# Patient Record
Sex: FEMALE | Race: WHITE | NOT HISPANIC OR LATINO | Employment: FULL TIME | URBAN - METROPOLITAN AREA
[De-identification: names, ages, dates, MRNs, and addresses within clinical notes are randomized per-mention and may not be internally consistent; named-entity substitution may affect disease eponyms.]

---

## 2017-02-16 ENCOUNTER — HOSPITAL ENCOUNTER (OUTPATIENT)
Dept: GENERAL RADIOLOGY | Facility: HOSPITAL | Age: 52
Discharge: HOME | End: 2017-02-16
Admitting: INTERNAL MEDICINE

## 2017-02-16 ENCOUNTER — TRANSCRIBE ORDERS (OUTPATIENT)
Dept: ADMINISTRATIVE | Facility: HOSPITAL | Age: 52
End: 2017-02-16

## 2017-02-16 ENCOUNTER — OFFICE VISIT (OUTPATIENT)
Dept: INTERNAL MEDICINE | Facility: CLINIC | Age: 52
End: 2017-02-16

## 2017-02-16 ENCOUNTER — HOSPITAL ENCOUNTER (OUTPATIENT)
Dept: GENERAL RADIOLOGY | Facility: HOSPITAL | Age: 52
Discharge: HOME | End: 2017-02-16

## 2017-02-16 VITALS
BODY MASS INDEX: 33.93 KG/M2 | OXYGEN SATURATION: 96 % | HEART RATE: 84 BPM | HEIGHT: 70 IN | SYSTOLIC BLOOD PRESSURE: 110 MMHG | DIASTOLIC BLOOD PRESSURE: 68 MMHG | WEIGHT: 237 LBS

## 2017-02-16 DIAGNOSIS — Z23 NEED FOR INFLUENZA VACCINATION: ICD-10-CM

## 2017-02-16 DIAGNOSIS — K63.5 BENIGN COLON POLYP: Chronic | ICD-10-CM

## 2017-02-16 DIAGNOSIS — G89.29 CHRONIC PAIN OF LEFT KNEE: ICD-10-CM

## 2017-02-16 DIAGNOSIS — G89.29 CHRONIC KNEE PAIN, UNSPECIFIED LATERALITY: ICD-10-CM

## 2017-02-16 DIAGNOSIS — Z23 NEED FOR TDAP VACCINATION: ICD-10-CM

## 2017-02-16 DIAGNOSIS — M25.569 CHRONIC KNEE PAIN, UNSPECIFIED LATERALITY: Primary | ICD-10-CM

## 2017-02-16 DIAGNOSIS — R63.5 ABNORMAL WEIGHT GAIN: Chronic | ICD-10-CM

## 2017-02-16 DIAGNOSIS — M25.562 CHRONIC PAIN OF LEFT KNEE: ICD-10-CM

## 2017-02-16 DIAGNOSIS — G89.29 CHRONIC KNEE PAIN, UNSPECIFIED LATERALITY: Primary | ICD-10-CM

## 2017-02-16 DIAGNOSIS — M25.569 CHRONIC KNEE PAIN, UNSPECIFIED LATERALITY: ICD-10-CM

## 2017-02-16 DIAGNOSIS — E66.9 NON MORBID OBESITY, UNSPECIFIED OBESITY TYPE: Chronic | ICD-10-CM

## 2017-02-16 DIAGNOSIS — J01.91 ACUTE RECURRENT SINUSITIS, UNSPECIFIED LOCATION: Primary | ICD-10-CM

## 2017-02-16 DIAGNOSIS — Z92.29 HISTORY OF TETANUS, DIPHTHERIA, AND ACELLULAR PERTUSSIS BOOSTER VACCINATION (TDAP): ICD-10-CM

## 2017-02-16 PROCEDURE — 73560 X-RAY EXAM OF KNEE 1 OR 2: CPT

## 2017-02-16 PROCEDURE — 90472 IMMUNIZATION ADMIN EACH ADD: CPT | Performed by: INTERNAL MEDICINE

## 2017-02-16 PROCEDURE — 90715 TDAP VACCINE 7 YRS/> IM: CPT | Performed by: INTERNAL MEDICINE

## 2017-02-16 PROCEDURE — 90656 IIV3 VACC NO PRSV 0.5 ML IM: CPT | Performed by: INTERNAL MEDICINE

## 2017-02-16 PROCEDURE — 90471 IMMUNIZATION ADMIN: CPT | Performed by: INTERNAL MEDICINE

## 2017-02-16 PROCEDURE — 73590 X-RAY EXAM OF LOWER LEG: CPT

## 2017-02-16 PROCEDURE — 99214 OFFICE O/P EST MOD 30 MIN: CPT | Performed by: INTERNAL MEDICINE

## 2017-02-16 RX ORDER — LEVOFLOXACIN 750 MG/1
TABLET ORAL
Qty: 10 TABLET | Refills: 0 | Status: SHIPPED | OUTPATIENT
Start: 2017-02-16 | End: 2017-03-14

## 2017-02-16 RX ORDER — PHENTERMINE HYDROCHLORIDE 37.5 MG/1
CAPSULE ORAL
Qty: 30 CAPSULE | Refills: 1 | Status: SHIPPED | OUTPATIENT
Start: 2017-02-16 | End: 2017-03-14 | Stop reason: SDUPTHER

## 2017-02-16 RX ORDER — TOPIRAMATE 50 MG/1
TABLET, FILM COATED ORAL
Qty: 30 TABLET | Refills: 3 | Status: SHIPPED | OUTPATIENT
Start: 2017-02-16 | End: 2017-05-01

## 2017-02-16 NOTE — PROGRESS NOTES
02/16/2017    Patient Information  Rosemarie CHINCHILLA [address] [address]      1965  [phone]    Chief Complaint:     Complaining of sinus infection and nose irritation.  Complaining of left knee pain.  Complaining of weight gain.  Follow-up recent colonoscopy and history of colon polyps.    History of Present Illness:    Patient with a history of hyperlipidemia, multiple colon polyps, diverticulosis of colon, allergic rhinitis/environmental allergies, cervical disc disease.  She presents today with concerns regarding several issues as will be described below.  Her past medical history reviewed and updated where necessary including her health maintenance parameters.  This reveals multiple deficiencies including the fact that she needs a Pap smear and several immunizations including influenza and TDaP.  It was also recommended the patient received a pneumococcal vaccination but I think she is a little too young for that at age 51.  Patient had a second colonoscopy towards the end of last year and it revealed multiple colon polyps.    The history regarding multiple colon polyps is as follows:    11/22/2016--colonoscopy revealed a polyp at the ileocecal valve and sigmoid colon.  There were 4 polyps at the rectosigmoid colon.  The single diverticulum in the sigmoid colon was not commented upon.  Pathology returned a sessile serrated adenoma at the ileocecal valve and the sigmoid colon.  The rectosigmoid polyp was hyperplastic.    06/21/2016--colonoscopy revealed a greater than 50 mm polyp at the ileocecal valve.  Pathology returned hyperplastic polyp.  A multilobulated 5 mm polyp was found in the transverse colon.  Pathology returned tubular adenoma with low grade dysplasia.  3 multilobulated polyps were found in the sigmoid colon.  Pathology revealed fragments of hyperplastic polyps.   2 multilobulated polyps were found in the rectosigmoid colon and pathology returned tubular adenoma with low-grade dysplasia.  A single small mouth diverticula and was found in the sigmoid.  Nonbleeding internal hemorrhoids.  General surgery recommended repeat colonoscopy in 3 months.    The history regarding sinusitis is as follows:    02/16/2017--patient again presents with a two-week history of left sided nasal congestion associated with discomfort in the nasal mucosa on that side of the nose, sinus pressure particularly in the morning, yellow/green posterior nasal drainage without fever or chills.  Examination reveals boggy nasal mucosa.  I do not see any intranasal lesions.  No definite tenderness to percussion over the sinuses.  I will treat patient for sinusitis and if she has another recurrence then we will refer her to ENT for possible endoscopic evaluation.  Once again, I do not appreciate any polyps.  Patient understands that my examination equipment and is somewhat limited.  We will treat her aggressively with the following: Levaquin 750 mg by mouth daily ×10 days.  She needs to apply bacitracin to her nares as per previous.  Stahist AD one by mouth every 6 hours as needed for congestion, not greater than 3 in 24 hours.    11/02/2016--patient presents with a two-week history of left sided nasal congestion associated with irritated and watery left eye.  She has left sided yellow purulent discharge.  No significant sinus pain but there is left-sided congestion and discomfort in the nasal passage.  Occasional sneezing.  No fever, chills, or other systemic signs or symptoms.  Patient has environmental allergies and I do think that's playing a role.  Examination reveals boggy and erythematous left sided nasal mucosa but no definite tenderness to percussion over the sinuses.  Her left eye looks a little red and there seems to be a little more puffiness of her left lower eyelid.  Plan is to treat for suspected  left maxillary sinusitis but I do think she needs aggressive treatment for environmental allergies as well.  Augmentin 875 mg twice a day ×12 days.  Prednisone 50 mg by mouth daily ×5 days, taper and discontinue.  Apply bacitracin ointment to left nasal mucosa several times daily.      The history regarding left knee pain is as follows:    02/16/2017--patient presents with a two-month history of left knee pain that is located anteriorly and medially.  It is situated below the anserine bursal area and tends to radiate up towards the patella medially.  Certain movements seem to aggravate it.  No known trauma.  Examination reveals tenderness over the tibia medially and below the anserine bursal area.  No gross deformity.  Ligaments seem stable and range of motion is good.  Pain is reproduced with pressure applied perpendicular to the knee joint and directed medially.  The tenderness over the tibial area makes me question whether or not there could be a stress fracture.  X-ray of the left knee ordered.  Depending upon the results, she may need orthopedic referral to Dr. Mo.    The history regarding abnormal weight gain/non-morbid obesity is as follows:    02/16/2017--patient presents with a several year history of weight gain despite multiple attempts at various diet and exercise regimens.  She has failed low carbohydrate diet.  He has not been to [employer] which in my opinion is a farce.  She is asking about Saxenda which I think could be very helpful but I explained to her that it's extremely expensive and her insurance is likely not pay for it.  Another option that is far cheaper would be phentermine and topiramate.  Start phentermine one by mouth daily along with topiramate 50 mg per day.  Patient will need to follow-up in about one month to reassess.    Review of Systems   Constitution: Positive for weight gain.   HENT: Positive for congestion.    Eyes: Negative.    Cardiovascular: Negative.     Respiratory: Negative.    Endocrine: Negative.    Hematologic/Lymphatic: Negative.    Skin: Negative.    Musculoskeletal: Positive for joint pain.   Gastrointestinal: Negative.    Genitourinary: Negative.    Neurological: Negative.    Psychiatric/Behavioral: Negative.    Allergic/Immunologic: Negative.        Active Problems:    Patient Active Problem List   Diagnosis   • Degeneration of intervertebral disc of mid-cervical region   • Hallux valgus of left foot   • Hyperlipidemia   • Non morbid obesity   • Vitamin D deficiency   • Therapeutic drug monitoring   • Routine physical examination   • Benign colon polyp, 11/22/2016-- adenoma ×2. 06/21/2016--tubular adenoma ×2.  Multiple hyperplastic.  Greater than 50 mm hyperplastic polyp at the ileocecal valve.  Repeat 3 months.   • Diverticulosis of colon   • Multiple environmental allergies   • Allergic rhinitis   • Acute sinusitis   • Chronic pain of left knee   • Abnormal weight gain         Past Medical History   Diagnosis Date   • History of Ingrown left greater toenail 03/13/2015 03/13/2015--patient was evaluated by the podiatrist and assessment was ingrown great toenail and paronychia. Partial nail avulsion completed.   03/13/2015--patient presents with a greater than one-year history of intermittent pain, swelling, redness, tenderness involving her left great toe. Examination reveals early hallux valgus deformity as well as a possible ingrown nail. Patient referred to po   • History of mammogram 09/18/2015 09/18/2015--negative mammogram.   • History of Perirectal abscess 9/1/2016 09/12/2016--patient seen in follow-up for dressing change.  Patient is concerned about not being on an antibiotic and I explained to her that the definitive treatment is incision and drainage.  On exam the wound looks very good with no sign of purulent discharge.  She has a follow-up with general surgeon tomorrow.  09/02/2016--incision and drainage of left  perirectal abscess by general surgery.  Cultures returned moderate growth of Klebsiella pneumoniae and heavy growth of Streptococcus agalactiae.   09/01/2016--patient presents with approximately 4 month history of a painful and swollen subcutaneous mass left buttock that is draining foul-smelling purulent material.  Examination confirms a large subcutaneous mass/abscess that I suspect is an infected sebaceous cyst.  General surgery referral given.   • History of Right cervical radiculopathy 9/11/2015 11/02/2016--right cervical radicular symptoms resolved after epidural injections.  08/22/2016--third cervical epidural injection.  06/20/2016--second cervical epidural injection.  06/09/2016--patient seen in follow-up and reports the epidural injections have definitely helped.  Her gabapentin was increased to 3 times a day but patient cannot tolerate the midday dose.  05/19/2016--first cervical epidural injection.  04/19/2016--patient seen in follow-up and reports the neurosurgeon does not recommend surgery.  Physical therapy ordered which seems to be helping somewhat.  01/14/2016--patient seen in follow-up and her symptoms are actually worse. MRI reviewed. Prednisone 50 mg by mouth daily times 7 days, taper and discontinue. Neurosurgical referral given.   12/23/2015--MRI cervical spine reveals at C4-C5 there is mild spinal stenosis. There is moderate to severe bilateral foraminal narrowing. At C5-C6 there is mild left but no right foraminal narrowing. At C6-C7 there are surgical changes with ha   • History of Right rotator cuff tendinitis 12/11/2015 12/11/2015--x-ray of the cervical spine reveals anterior discectomy and fusion at C6-C7. There is moderate disc space narrowing at C4-C5 and the remainder of the cervical disc spaces are within normal limits. There is moderately severe narrowing of the right neural foramen at C3-C5 and also moderate narrowing of the left neural foramen at this level. Mild bilateral  foraminal narrowing is also pres         Past Surgical History   Procedure Laterality Date   • Wrist arthroplasty Right 07/15/2008     07/15/2008--right radial ulnar distal joint arthroplasty performed for non-traumatic arthritis/impingement syndrome.   • Cervical fusion  07/23/2012 07/23/2012--anterior cervical spinal fusion, C6--C7 with instrumentation.   • Endometrial ablation  2006 2006--endometrial ablation ×2 for menorrhagia/menorrhea.   • Colonoscopy N/A 6/21/2016 06/21/2016--colonoscopy revealed a greater than 50 mm polyp at the ileocecal valve.  Pathology returned hyperplastic polyp.  A multilobulated 5 mm polyp was found in the transverse colon.  Pathology returned tubular adenoma with low grade dysplasia.  3 multilobulated polyps were found in the sigmoid colon.  Pathology revealed fragments of hyperplastic polyps.  2 multilobulated polyps were found in   • Incision and drainage perirectal abscess N/A 9/2/2016 09/02/2016--incision and drainage of perirectal abscess.   • Partial hysterectomy  10/2008     October 2008--partial hysterectomy for menorrhagia/menorrhea. Previously had endometrial ablation procedures 2. Pt states uterus was removed.   • Colonoscopy N/A 11/22/2016 11/22/2016--colonoscopy revealed a polyp at the ileocecal valve and sigmoid colon.  There were 4 polyps at the rectosigmoid colon.  The single diverticulum in the sigmoid colon was not commented upon.  Pathology returned a sessile serrated adenoma at the ileocecal valve and the sigmoid colon.  The rectosigmoid polyp was hyperplastic.         No Known Allergies        Current Outpatient Prescriptions:   •  Cholecalciferol (VITAMIN D3) 5000 UNITS tablet, Take 1 tablet by mouth Daily., Disp: , Rfl:   •  gabapentin (NEURONTIN) 600 MG tablet, Take 1 tablet by mouth 2 (Two) Times a Day., Disp: 180 tablet, Rfl: 3  •  ibuprofen (ADVIL,MOTRIN) 200 MG tablet, Take 200 mg by mouth every 6 (six) hours as needed for mild pain  "(1-3)., Disp: , Rfl:       Family History   Problem Relation Age of Onset   • Diabetes Mother      Type 2   • Diabetes Maternal Grandmother      Type 2         Social History     Social History   • Marital status:      Spouse name: N/A   • Number of children: 2   • Years of education: 12     Occupational History   •       Social History Main Topics   • Smoking status: Current Every Day Smoker     Packs/day: 0.50     Years: 20.00     Types: Cigarettes   • Smokeless tobacco: Never Used   • Alcohol use Yes      Comment: Occasional   • Drug use: No   • Sexual activity: Yes     Partners: Male     Other Topics Concern   • Not on file     Social History Narrative         Vitals:    02/16/17 1543   BP: 110/68   Pulse: 84   SpO2: 96%   Weight: 237 lb (108 kg)   Height: 70\" (177.8 cm)          Physical Exam:    General: Alert and oriented x 3. Obese.  No acute distress.  Normal affect.  HEENT: Pupils equal, round, reactive to light; extraocular movements intact; sclerae nonicteric; pharynx, ear canals and TMs normal.  She has boggy nasal mucosa but I do not appreciate any intranasal lesions.  Neck: Without JVD, thyromegaly, bruit, or adenopathy.  Lungs: Clear to auscultation in all fields.  Heart: Regular rate and rhythm without murmur, rub, gallop, or click.  Abdomen: Soft, nontender, without hepatosplenomegaly or hernia.  Bowel sounds normal.  : Deferred.  Rectal: Deferred.  Extremities: Without clubbing, cyanosis, edema, or pulse deficit.  Neurologic: Intact without focal deficit.  Normal station and gait observed during ingress and egress from the examination room.  Skin: Without significant lesion.  Musculoskeletal: Unremarkable except tenderness noted to palpation over the tibia medially below the anserine bursal area.  Ligaments seem stable.      Lab/other results:    I reviewed her to colonoscopy reports from last year as well as the pathology report.    Assessment/Plan:     Diagnosis " Plan   1. Acute recurrent sinusitis, unspecified location     2. Chronic pain of left knee     3. Abnormal weight gain     4. Non morbid obesity, unspecified obesity type     5. Benign colon polyp, 11/22/2016-- adenoma ×2. 06/21/2016--tubular adenoma ×2.  Multiple hyperplastic.  Greater than 50 mm hyperplastic polyp at the ileocecal valve.  Repeat 3 months.         Patient presents with another episode consistent with sinusitis.  I suppose environmental allergies could be playing a role but this is not definite.  She presents with chronic pain involving the left knee and tenderness noted over the tibia.  I'm concerned about the possibility of a stress fracture.  Another consideration would be internal derangement.  Patient has abnormal weight gain/non-morbid obesity despite multiple attempts at various diet and exercise regimens.  She has a history of colon polyps and I explained to her that she likely would have developed colon cancer had these polyps not be removed.  She understands that she needs to have regular colonoscopies as directed by the general surgeon or vomiting.    Plan is as follows: Levaquin 750 mg by mouth daily ×10 days.  Stahist AD.  Apply bacitracin to the nasal mucosa as directed as per previous.  Start phentermine 1 by mouth daily along with topiramate 1 by mouth daily, 50 mg.  X-ray of the left knee and tibia ordered.  Patient can follow-up on the phone for the results.  She may need orthopedic referral.  Fluvirin given.  TDaP given.  I have encouraged patient to see the gynecologist for a Pap smear.  I will have her follow-up in about one month to reassess his situation, particularly the abnormal weight gain.          Procedures

## 2017-02-17 DIAGNOSIS — M25.562 CHRONIC PAIN OF LEFT KNEE: Primary | ICD-10-CM

## 2017-02-17 DIAGNOSIS — G89.29 CHRONIC PAIN OF LEFT KNEE: Primary | ICD-10-CM

## 2017-02-17 NOTE — PROGRESS NOTES
I called the pt and let her know she has fluid on her knee and dr coats is sending her to ortho spec, dr klein.  Someone would be calling to schedule the appt.

## 2017-03-14 ENCOUNTER — OFFICE VISIT (OUTPATIENT)
Dept: INTERNAL MEDICINE | Facility: CLINIC | Age: 52
End: 2017-03-14

## 2017-03-14 VITALS
HEIGHT: 70 IN | HEART RATE: 93 BPM | OXYGEN SATURATION: 96 % | SYSTOLIC BLOOD PRESSURE: 112 MMHG | DIASTOLIC BLOOD PRESSURE: 62 MMHG | WEIGHT: 232 LBS | BODY MASS INDEX: 33.21 KG/M2

## 2017-03-14 DIAGNOSIS — M25.562 CHRONIC PAIN OF LEFT KNEE: Chronic | ICD-10-CM

## 2017-03-14 DIAGNOSIS — E66.9 NON MORBID OBESITY, UNSPECIFIED OBESITY TYPE: Chronic | ICD-10-CM

## 2017-03-14 DIAGNOSIS — R63.5 ABNORMAL WEIGHT GAIN: Primary | Chronic | ICD-10-CM

## 2017-03-14 DIAGNOSIS — G89.29 CHRONIC PAIN OF LEFT KNEE: Chronic | ICD-10-CM

## 2017-03-14 DIAGNOSIS — R63.5 ABNORMAL WEIGHT GAIN: Chronic | ICD-10-CM

## 2017-03-14 DIAGNOSIS — Z11.59 NEED FOR HEPATITIS C SCREENING TEST: ICD-10-CM

## 2017-03-14 PROCEDURE — 99213 OFFICE O/P EST LOW 20 MIN: CPT | Performed by: INTERNAL MEDICINE

## 2017-03-14 RX ORDER — PHENTERMINE HYDROCHLORIDE 37.5 MG/1
CAPSULE ORAL
Qty: 30 CAPSULE | Refills: 1 | Status: SHIPPED | OUTPATIENT
Start: 2017-03-14 | End: 2017-05-01

## 2017-03-14 NOTE — PROGRESS NOTES
03/14/2017    Patient Information  Rosemarie CHINCHILLA [address] [address]      1965  [phone]    Chief Complaint:     Follow-up abnormal weight gain/nonmorbid obesity.  No new acute complaints.  Also follow-up chronic pain of the left knee.    History of Present Illness:    Patient with a history of hyperlipidemia, abnormal weight gain/nonmorbid obesity, colon polyps, multiple environmental allergies/allergic rhinitis, chronic pain of left knee.  She presents today to follow-up on 2 issues as described below.  Her past medical history reviewed and updated where necessary including her health maintenance parameters.  This reveals she needs her GYN exam.  She is also scheduled for her annual physical in April of this year.    The history regarding abnormal weight gain/nonmorbid obesity:    03/14/2017--patient seen in follow-up and has lost 5 pounds.  She seems to be tolerating the medication well but does have a dry mouth.  I see no reason why we cannot continue the current regimen.  I will have her follow-up in about 5-6 weeks to reassess.  Current weight is 232 pounds.    02/16/2017--patient presents with a several year history of weight gain despite multiple attempts at various diet and exercise regimens.  She has failed low carbohydrate diet.  He has not been to Weight Watchers which in my opinion is a farce.  She is asking about Saxenda which I think could be very helpful but I explained to her that it's extremely expensive and her insurance is likely not pay for it.  Another option that is far cheaper would be phentermine and topiramate.  Start phentermine one by mouth daily along with topiramate 50 mg per day.  Patient will need to follow-up in about one month to reassess. Current weight is 237 pounds.    The history regarding chronic pain of the left  knee:    03/14/2017--patient seen in follow-up by Dr. Alberto.  He reports he saw the physician assistant at the orthopedist office.  She was given a cortisone shot in the left knee and this resulted in resolution of her pain currently.  Patient is aware to contact orthopedist if she begins to develop recurrence of pain.    02/16/2017--patient presents with a two-month history of left knee pain that is located anteriorly and medially.  It is situated below the anserine bursal area and tends to radiate up towards the patella medially.  Certain movements seem to aggravate it.  No known trauma.  Examination reveals tenderness over the tibia medially and below the anserine bursal area.  No gross deformity.  Ligaments seem stable and range of motion is good.  Pain is reproduced with pressure applied perpendicular to the knee joint and directed medially.  The tenderness over the tibial area makes me question whether or not there could be a stress fracture.  X-ray of the left knee ordered.  Depending upon the results, she may need orthopedic referral to Dr. Mo.    Review of Systems   Constitution: Positive for weight gain.   HENT: Negative.    Eyes: Negative.    Cardiovascular: Negative.    Respiratory: Negative.    Endocrine: Negative.    Hematologic/Lymphatic: Negative.    Skin: Negative.    Musculoskeletal: Negative.    Gastrointestinal: Negative.    Genitourinary: Negative.    Neurological: Negative.    Psychiatric/Behavioral: Negative.    Allergic/Immunologic: Negative.        Active Problems:    Patient Active Problem List   Diagnosis   • Degeneration of intervertebral disc of mid-cervical region   • Hallux valgus of left foot   • Hyperlipidemia   • Non morbid obesity   • Vitamin D deficiency   • Therapeutic drug monitoring   • Routine physical examination   • Benign colon polyp, 11/22/2016-- adenoma ×2. 06/21/2016--tubular adenoma ×2.  Multiple hyperplastic.  Greater than 50 mm hyperplastic polyp at the  ileocecal valve.  Repeat 3 months.   • Diverticulosis of colon   • Multiple environmental allergies   • Allergic rhinitis   • Chronic pain of left knee   • Abnormal weight gain         Past Medical History   Diagnosis Date   • History of acute sinusitis 11/2/2016 02/16/2017--patient again presents with a two-week history of left sided nasal congestion associated with discomfort in the nasal mucosa on that side of the nose, sinus pressure particularly in the morning, yellow/green posterior nasal drainage without fever or chills.  Examination reveals boggy nasal mucosa.  I do not see any intranasal lesions.  No definite tenderness to percussion over the sinuses.  I will treat patient for sinusitis and if she has another recurrence then we will refer her to ENT for possible endoscopic evaluation.  Once again, I do not appreciate any polyps.  Patient understands that my examination equipment and is somewhat limited.  We will treat her aggressively with the following: Levaquin 750 mg by mouth daily ×10 days.  She needs to apply bacitracin to her nares as per previous.  Stahist AD one by mouth every 6 hours as needed for congestion, not greater than 3 in 24 hours.  11/02/2016--patient presents with a two-week history of left sided nasal congestion associated with irritate   • History of Ingrown left greater toenail 03/13/2015 03/13/2015--patient was evaluated by the podiatrist and assessment was ingrown great toenail and paronychia. Partial nail avulsion completed.   03/13/2015--patient presents with a greater than one-year history of intermittent pain, swelling, redness, tenderness involving her left great toe. Examination reveals early hallux valgus deformity as well as a possible ingrown nail. Patient referred to po   • History of mammogram 09/18/2015 09/18/2015--negative mammogram.   • History of Perirectal abscess 9/1/2016 09/12/2016--patient seen in follow-up for dressing change.  Patient is  concerned about not being on an antibiotic and I explained to her that the definitive treatment is incision and drainage.  On exam the wound looks very good with no sign of purulent discharge.  She has a follow-up with general surgeon tomorrow.  09/02/2016--incision and drainage of left perirectal abscess by general surgery.  Cultures returned moderate growth of Klebsiella pneumoniae and heavy growth of Streptococcus agalactiae.   09/01/2016--patient presents with approximately 4 month history of a painful and swollen subcutaneous mass left buttock that is draining foul-smelling purulent material.  Examination confirms a large subcutaneous mass/abscess that I suspect is an infected sebaceous cyst.  General surgery referral given.   • History of Right cervical radiculopathy 9/11/2015 11/02/2016--right cervical radicular symptoms resolved after epidural injections.  08/22/2016--third cervical epidural injection.  06/20/2016--second cervical epidural injection.  06/09/2016--patient seen in follow-up and reports the epidural injections have definitely helped.  Her gabapentin was increased to 3 times a day but patient cannot tolerate the midday dose.  05/19/2016--first cervical epidural injection.  04/19/2016--patient seen in follow-up and reports the neurosurgeon does not recommend surgery.  Physical therapy ordered which seems to be helping somewhat.  01/14/2016--patient seen in follow-up and her symptoms are actually worse. MRI reviewed. Prednisone 50 mg by mouth daily times 7 days, taper and discontinue. Neurosurgical referral given.   12/23/2015--MRI cervical spine reveals at C4-C5 there is mild spinal stenosis. There is moderate to severe bilateral foraminal narrowing. At C5-C6 there is mild left but no right foraminal narrowing. At C6-C7 there are surgical changes with ha   • History of Right rotator cuff tendinitis 12/11/2015 12/11/2015--x-ray of the cervical spine reveals anterior discectomy and fusion  at C6-C7. There is moderate disc space narrowing at C4-C5 and the remainder of the cervical disc spaces are within normal limits. There is moderately severe narrowing of the right neural foramen at C3-C5 and also moderate narrowing of the left neural foramen at this level. Mild bilateral foraminal narrowing is also pres   • History of tetanus, diphtheria, and acellular pertussis booster vaccination (Tdap) 2/16/2017 02/16/2017--TDaP given         Past Surgical History   Procedure Laterality Date   • Wrist arthroplasty Right 07/15/2008     07/15/2008--right radial ulnar distal joint arthroplasty performed for non-traumatic arthritis/impingement syndrome.   • Cervical fusion  07/23/2012 07/23/2012--anterior cervical spinal fusion, C6--C7 with instrumentation.   • Endometrial ablation  2006 2006--endometrial ablation ×2 for menorrhagia/menorrhea.   • Colonoscopy N/A 6/21/2016 06/21/2016--colonoscopy revealed a greater than 50 mm polyp at the ileocecal valve.  Pathology returned hyperplastic polyp.  A multilobulated 5 mm polyp was found in the transverse colon.  Pathology returned tubular adenoma with low grade dysplasia.  3 multilobulated polyps were found in the sigmoid colon.  Pathology revealed fragments of hyperplastic polyps.  2 multilobulated polyps were found in   • Incision and drainage perirectal abscess N/A 9/2/2016 09/02/2016--incision and drainage of perirectal abscess.   • Partial hysterectomy  10/2008     October 2008--partial hysterectomy for menorrhagia/menorrhea. Previously had endometrial ablation procedures 2. Pt states uterus was removed.   • Colonoscopy N/A 11/22/2016 11/22/2016--colonoscopy revealed a polyp at the ileocecal valve and sigmoid colon.  There were 4 polyps at the rectosigmoid colon.  The single diverticulum in the sigmoid colon was not commented upon.  Pathology returned a sessile serrated adenoma at the ileocecal valve and the sigmoid colon.  The rectosigmoid  "polyp was hyperplastic.         No Known Allergies        Current Outpatient Prescriptions:   •  Cholecalciferol (VITAMIN D3) 5000 UNITS tablet, Take 1 tablet by mouth Daily., Disp: , Rfl:   •  gabapentin (NEURONTIN) 600 MG tablet, Take 1 tablet by mouth 2 (Two) Times a Day., Disp: 180 tablet, Rfl: 3  •  ibuprofen (ADVIL,MOTRIN) 200 MG tablet, Take 200 mg by mouth every 6 (six) hours as needed for mild pain (1-3)., Disp: , Rfl:   •  phentermine 37.5 MG capsule, 1 by mouth daily as directed, Disp: 30 capsule, Rfl: 1  •  topiramate (TOPAMAX) 50 MG tablet, 1 by mouth daily as directed, Disp: 30 tablet, Rfl: 3      Family History   Problem Relation Age of Onset   • Diabetes Mother      Type 2   • Diabetes Maternal Grandmother      Type 2         Social History     Social History   • Marital status:      Spouse name: N/A   • Number of children: 2   • Years of education: 12     Occupational History   •       Social History Main Topics   • Smoking status: Current Every Day Smoker     Packs/day: 0.50     Years: 20.00     Types: Cigarettes   • Smokeless tobacco: Never Used   • Alcohol use Yes      Comment: Occasional   • Drug use: No   • Sexual activity: Yes     Partners: Male     Other Topics Concern   • Not on file     Social History Narrative         Vitals:    03/14/17 1521   BP: 112/62   Pulse: 93   SpO2: 96%   Weight: 232 lb (105 kg)   Height: 70\" (177.8 cm)          Physical Exam:    General: Alert and oriented x 3. Obese. No acute distress.  Normal affect.  HEENT: Pupils equal, round, reactive to light; extraocular movements intact; sclerae nonicteric; pharynx, ear canals and TMs normal.  Neck: Without JVD, thyromegaly, bruit, or adenopathy.  Lungs: Clear to auscultation in all fields.  Heart: Regular rate and rhythm without murmur, rub, gallop, or click.  Abdomen: Soft, nontender, without hepatosplenomegaly or hernia.  Bowel sounds normal.  : Deferred.  Rectal: Deferred.  " Extremities: Without clubbing, cyanosis, edema, or pulse deficit.  Neurologic: Intact without focal deficit.  Normal station and gait observed during ingress and egress from the examination room.  Skin: Without significant lesion.  Musculoskeletal: Unremarkable.      Lab/other results:      Assessment/Plan:     Diagnosis Plan   1. Abnormal weight gain     2. Non morbid obesity, unspecified obesity type     3. Chronic pain of left knee         Patient with abnormal weight gain/nonmorbid obesity is making some progress on the current regimen.  She has several other comorbidities that would definitely improve with continued weight loss and therefore I think weight loss medication is just fine.  Her left knee pain has essentially resolved after cortisone injection and certainly continued weight loss will help the prognosis regarding that as well.    Plan is as follows: Continue current medical regimen.  Keep previously scheduled follow-up appointment for her annual exam with lab prior.        Procedures

## 2017-03-15 ENCOUNTER — RESULTS ENCOUNTER (OUTPATIENT)
Dept: INTERNAL MEDICINE | Facility: CLINIC | Age: 52
End: 2017-03-15

## 2017-03-15 DIAGNOSIS — Z11.59 NEED FOR HEPATITIS C SCREENING TEST: ICD-10-CM

## 2017-04-23 LAB
25(OH)D3+25(OH)D2 SERPL-MCNC: 43.9 NG/ML (ref 30–100)
ALBUMIN SERPL-MCNC: 4.1 G/DL (ref 3.5–5.2)
ALBUMIN/GLOB SERPL: 2 G/DL
ALP SERPL-CCNC: 57 U/L (ref 39–117)
ALT SERPL-CCNC: 13 U/L (ref 1–33)
APPEARANCE UR: CLEAR
AST SERPL-CCNC: 14 U/L (ref 1–32)
BILIRUB SERPL-MCNC: 0.4 MG/DL (ref 0.1–1.2)
BILIRUB UR QL STRIP: NEGATIVE
BUN SERPL-MCNC: 10 MG/DL (ref 6–20)
BUN/CREAT SERPL: 13.3 (ref 7–25)
CALCIUM SERPL-MCNC: 9.4 MG/DL (ref 8.6–10.5)
CHLORIDE SERPL-SCNC: 103 MMOL/L (ref 98–107)
CHOLEST SERPL-MCNC: 223 MG/DL (ref 100–199)
CO2 SERPL-SCNC: 25.2 MMOL/L (ref 22–29)
COLOR UR: YELLOW
CREAT SERPL-MCNC: 0.75 MG/DL (ref 0.57–1)
ERYTHROCYTE [DISTWIDTH] IN BLOOD BY AUTOMATED COUNT: 14.1 % (ref 11.7–13)
GLOBULIN SER CALC-MCNC: 2.1 GM/DL
GLUCOSE SERPL-MCNC: 91 MG/DL (ref 65–99)
GLUCOSE UR QL: NEGATIVE
HCT VFR BLD AUTO: 43 % (ref 35.6–45.5)
HCV AB S/CO SERPL IA: <0.1 S/CO RATIO (ref 0–0.9)
HDL SERPL-SCNC: 36 UMOL/L
HDLC SERPL-MCNC: 57 MG/DL
HGB BLD-MCNC: 13.9 G/DL (ref 11.9–15.5)
HGB UR QL STRIP: NEGATIVE
KETONES UR QL STRIP: NEGATIVE
LDL SERPL QN: 21.6 NM
LDL SERPL-SCNC: 1599 NMOL/L
LDL SMALL SERPL-SCNC: 448 NMOL/L
LDLC SERPL CALC-MCNC: 142 MG/DL (ref 0–99)
LEUKOCYTE ESTERASE UR QL STRIP: NEGATIVE
MCH RBC QN AUTO: 29.6 PG (ref 26.9–32)
MCHC RBC AUTO-ENTMCNC: 32.3 G/DL (ref 32.4–36.3)
MCV RBC AUTO: 91.5 FL (ref 80.5–98.2)
NITRITE UR QL STRIP: NEGATIVE
PH UR STRIP: 6.5 [PH] (ref 5–8)
PLATELET # BLD AUTO: 263 10*3/MM3 (ref 140–500)
POTASSIUM SERPL-SCNC: 4.2 MMOL/L (ref 3.5–5.2)
PROT SERPL-MCNC: 6.2 G/DL (ref 6–8.5)
PROT UR QL STRIP: NEGATIVE
RBC # BLD AUTO: 4.7 10*6/MM3 (ref 3.9–5.2)
SODIUM SERPL-SCNC: 142 MMOL/L (ref 136–145)
SP GR UR: 1.03 (ref 1–1.03)
TRIGL SERPL-MCNC: 120 MG/DL (ref 0–149)
TSH SERPL DL<=0.005 MIU/L-ACNC: 3.03 UIU/ML (ref 0.45–4.5)
UROBILINOGEN UR STRIP-MCNC: NORMAL MG/DL
WBC # BLD AUTO: 9.23 10*3/MM3 (ref 4.5–10.7)

## 2017-05-01 ENCOUNTER — OFFICE VISIT (OUTPATIENT)
Dept: INTERNAL MEDICINE | Facility: CLINIC | Age: 52
End: 2017-05-01

## 2017-05-01 VITALS
BODY MASS INDEX: 33.21 KG/M2 | HEART RATE: 77 BPM | SYSTOLIC BLOOD PRESSURE: 104 MMHG | WEIGHT: 232 LBS | OXYGEN SATURATION: 97 % | DIASTOLIC BLOOD PRESSURE: 67 MMHG | HEIGHT: 70 IN

## 2017-05-01 DIAGNOSIS — M50.320 DEGENERATION OF INTERVERTEBRAL DISC OF MID-CERVICAL REGION, UNSPECIFIED SPINAL LEVEL: Chronic | ICD-10-CM

## 2017-05-01 DIAGNOSIS — E78.5 HYPERLIPIDEMIA, UNSPECIFIED HYPERLIPIDEMIA TYPE: Chronic | ICD-10-CM

## 2017-05-01 DIAGNOSIS — K63.5 BENIGN COLON POLYP: Chronic | ICD-10-CM

## 2017-05-01 DIAGNOSIS — Z00.00 ROUTINE PHYSICAL EXAMINATION: Primary | ICD-10-CM

## 2017-05-01 DIAGNOSIS — E66.9 NON MORBID OBESITY, UNSPECIFIED OBESITY TYPE: Chronic | ICD-10-CM

## 2017-05-01 DIAGNOSIS — R63.5 ABNORMAL WEIGHT GAIN: Chronic | ICD-10-CM

## 2017-05-01 PROCEDURE — 99396 PREV VISIT EST AGE 40-64: CPT | Performed by: INTERNAL MEDICINE

## 2017-05-23 ENCOUNTER — OFFICE VISIT (OUTPATIENT)
Dept: SURGERY | Facility: CLINIC | Age: 52
End: 2017-05-23

## 2017-05-23 VITALS — OXYGEN SATURATION: 95 % | BODY MASS INDEX: 33.24 KG/M2 | HEART RATE: 92 BPM | HEIGHT: 70 IN | WEIGHT: 232.2 LBS

## 2017-05-23 DIAGNOSIS — K60.4 PERIRECTAL FISTULA: Primary | ICD-10-CM

## 2017-05-23 DIAGNOSIS — K63.5 COLON POLYP: ICD-10-CM

## 2017-05-23 PROCEDURE — 99213 OFFICE O/P EST LOW 20 MIN: CPT | Performed by: SURGERY

## 2017-05-23 RX ORDER — SODIUM CHLORIDE 0.9 % (FLUSH) 0.9 %
1-10 SYRINGE (ML) INJECTION AS NEEDED
Status: CANCELLED | OUTPATIENT
Start: 2017-05-23

## 2017-05-23 RX ORDER — CEFAZOLIN SODIUM 2 G/100ML
2 INJECTION, SOLUTION INTRAVENOUS ONCE
Status: CANCELLED | OUTPATIENT
Start: 2017-05-23 | End: 2017-05-23

## 2017-05-23 RX ORDER — CETIRIZINE HYDROCHLORIDE 10 MG/1
10 TABLET ORAL DAILY
COMMUNITY
End: 2019-10-17

## 2017-05-31 ENCOUNTER — APPOINTMENT (OUTPATIENT)
Dept: PREADMISSION TESTING | Facility: HOSPITAL | Age: 52
End: 2017-05-31

## 2017-05-31 VITALS
HEIGHT: 70 IN | SYSTOLIC BLOOD PRESSURE: 116 MMHG | BODY MASS INDEX: 32.86 KG/M2 | OXYGEN SATURATION: 98 % | DIASTOLIC BLOOD PRESSURE: 75 MMHG | TEMPERATURE: 97.8 F | WEIGHT: 229.5 LBS | RESPIRATION RATE: 18 BRPM | HEART RATE: 74 BPM

## 2017-05-31 LAB
ANION GAP SERPL CALCULATED.3IONS-SCNC: 11.1 MMOL/L
BUN BLD-MCNC: 10 MG/DL (ref 6–20)
BUN/CREAT SERPL: 13.3 (ref 7–25)
CALCIUM SPEC-SCNC: 9.6 MG/DL (ref 8.6–10.5)
CHLORIDE SERPL-SCNC: 103 MMOL/L (ref 98–107)
CO2 SERPL-SCNC: 26.9 MMOL/L (ref 22–29)
CREAT BLD-MCNC: 0.75 MG/DL (ref 0.57–1)
DEPRECATED RDW RBC AUTO: 45.3 FL (ref 37–54)
ERYTHROCYTE [DISTWIDTH] IN BLOOD BY AUTOMATED COUNT: 13.9 % (ref 11.7–13)
GFR SERPL CREATININE-BSD FRML MDRD: 82 ML/MIN/1.73
GLUCOSE BLD-MCNC: 83 MG/DL (ref 65–99)
HCT VFR BLD AUTO: 42.7 % (ref 35.6–45.5)
HGB BLD-MCNC: 14.5 G/DL (ref 11.9–15.5)
MCH RBC QN AUTO: 30.1 PG (ref 26.9–32)
MCHC RBC AUTO-ENTMCNC: 34 G/DL (ref 32.4–36.3)
MCV RBC AUTO: 88.6 FL (ref 80.5–98.2)
PLATELET # BLD AUTO: 258 10*3/MM3 (ref 140–500)
PMV BLD AUTO: 10.4 FL (ref 6–12)
POTASSIUM BLD-SCNC: 4.2 MMOL/L (ref 3.5–5.2)
RBC # BLD AUTO: 4.82 10*6/MM3 (ref 3.9–5.2)
SODIUM BLD-SCNC: 141 MMOL/L (ref 136–145)
WBC NRBC COR # BLD: 9.85 10*3/MM3 (ref 4.5–10.7)

## 2017-05-31 PROCEDURE — 80048 BASIC METABOLIC PNL TOTAL CA: CPT | Performed by: SURGERY

## 2017-05-31 PROCEDURE — 36415 COLL VENOUS BLD VENIPUNCTURE: CPT

## 2017-05-31 PROCEDURE — 85027 COMPLETE CBC AUTOMATED: CPT | Performed by: SURGERY

## 2017-06-01 ENCOUNTER — ANESTHESIA (OUTPATIENT)
Dept: PERIOP | Facility: HOSPITAL | Age: 52
End: 2017-06-01

## 2017-06-01 ENCOUNTER — ANESTHESIA EVENT (OUTPATIENT)
Dept: PERIOP | Facility: HOSPITAL | Age: 52
End: 2017-06-01

## 2017-06-01 ENCOUNTER — HOSPITAL ENCOUNTER (OUTPATIENT)
Facility: HOSPITAL | Age: 52
Setting detail: HOSPITAL OUTPATIENT SURGERY
Discharge: HOME | End: 2017-06-01
Attending: SURGERY | Admitting: SURGERY

## 2017-06-01 VITALS
DIASTOLIC BLOOD PRESSURE: 83 MMHG | TEMPERATURE: 97.8 F | WEIGHT: 226.4 LBS | OXYGEN SATURATION: 97 % | HEART RATE: 68 BPM | RESPIRATION RATE: 16 BRPM | HEIGHT: 70 IN | SYSTOLIC BLOOD PRESSURE: 122 MMHG | BODY MASS INDEX: 32.41 KG/M2

## 2017-06-01 DIAGNOSIS — K63.5 COLON POLYP: ICD-10-CM

## 2017-06-01 DIAGNOSIS — K60.4 PERIRECTAL FISTULA: ICD-10-CM

## 2017-06-01 PROCEDURE — 45380 COLONOSCOPY AND BIOPSY: CPT | Performed by: SURGERY

## 2017-06-01 PROCEDURE — 25010000002 FENTANYL CITRATE (PF) 100 MCG/2ML SOLUTION: Performed by: NURSE ANESTHETIST, CERTIFIED REGISTERED

## 2017-06-01 PROCEDURE — 25010000002 PROPOFOL 10 MG/ML EMULSION: Performed by: NURSE ANESTHETIST, CERTIFIED REGISTERED

## 2017-06-01 PROCEDURE — 88304 TISSUE EXAM BY PATHOLOGIST: CPT | Performed by: SURGERY

## 2017-06-01 PROCEDURE — 25010000002 DEXAMETHASONE PER 1 MG: Performed by: NURSE ANESTHETIST, CERTIFIED REGISTERED

## 2017-06-01 PROCEDURE — 25010000003 CEFAZOLIN IN DEXTROSE 2-4 GM/100ML-% SOLUTION: Performed by: SURGERY

## 2017-06-01 PROCEDURE — 25010000002 MIDAZOLAM PER 1 MG: Performed by: ANESTHESIOLOGY

## 2017-06-01 PROCEDURE — 25010000002 ONDANSETRON PER 1 MG: Performed by: NURSE ANESTHETIST, CERTIFIED REGISTERED

## 2017-06-01 PROCEDURE — 45385 COLONOSCOPY W/LESION REMOVAL: CPT | Performed by: SURGERY

## 2017-06-01 PROCEDURE — 46320 REMOVAL OF HEMORRHOID CLOT: CPT | Performed by: SURGERY

## 2017-06-01 PROCEDURE — 46270 REMOVE ANAL FIST SUBQ: CPT | Performed by: SURGERY

## 2017-06-01 PROCEDURE — 88305 TISSUE EXAM BY PATHOLOGIST: CPT | Performed by: SURGERY

## 2017-06-01 PROCEDURE — 25010000002 HYDROMORPHONE PER 4 MG: Performed by: NURSE ANESTHETIST, CERTIFIED REGISTERED

## 2017-06-01 DEVICE — DEV CLIP ENDO RESOLUTION360 CONTRL ROT 235CM: Type: IMPLANTABLE DEVICE | Status: FUNCTIONAL

## 2017-06-01 DEVICE — CLIPPING DEVICE
Type: IMPLANTABLE DEVICE | Status: FUNCTIONAL
Brand: RESOLUTION CLIP

## 2017-06-01 RX ORDER — MIDAZOLAM HYDROCHLORIDE 1 MG/ML
2 INJECTION INTRAMUSCULAR; INTRAVENOUS
Status: DISCONTINUED | OUTPATIENT
Start: 2017-06-01 | End: 2017-06-01 | Stop reason: HOSPADM

## 2017-06-01 RX ORDER — SODIUM CHLORIDE, SODIUM LACTATE, POTASSIUM CHLORIDE, CALCIUM CHLORIDE 600; 310; 30; 20 MG/100ML; MG/100ML; MG/100ML; MG/100ML
100 INJECTION, SOLUTION INTRAVENOUS CONTINUOUS
Status: DISCONTINUED | OUTPATIENT
Start: 2017-06-01 | End: 2017-06-01 | Stop reason: HOSPADM

## 2017-06-01 RX ORDER — DEXAMETHASONE SODIUM PHOSPHATE 10 MG/ML
INJECTION INTRAMUSCULAR; INTRAVENOUS AS NEEDED
Status: DISCONTINUED | OUTPATIENT
Start: 2017-06-01 | End: 2017-06-01 | Stop reason: SURG

## 2017-06-01 RX ORDER — SODIUM CHLORIDE 0.9 % (FLUSH) 0.9 %
1-10 SYRINGE (ML) INJECTION AS NEEDED
Status: DISCONTINUED | OUTPATIENT
Start: 2017-06-01 | End: 2017-06-01 | Stop reason: HOSPADM

## 2017-06-01 RX ORDER — ONDANSETRON 2 MG/ML
4 INJECTION INTRAMUSCULAR; INTRAVENOUS ONCE AS NEEDED
Status: DISCONTINUED | OUTPATIENT
Start: 2017-06-01 | End: 2017-06-01 | Stop reason: HOSPADM

## 2017-06-01 RX ORDER — OXYCODONE AND ACETAMINOPHEN 7.5; 325 MG/1; MG/1
1 TABLET ORAL ONCE AS NEEDED
Status: DISCONTINUED | OUTPATIENT
Start: 2017-06-01 | End: 2017-06-01 | Stop reason: HOSPADM

## 2017-06-01 RX ORDER — MIDAZOLAM HYDROCHLORIDE 1 MG/ML
1 INJECTION INTRAMUSCULAR; INTRAVENOUS
Status: DISCONTINUED | OUTPATIENT
Start: 2017-06-01 | End: 2017-06-01 | Stop reason: HOSPADM

## 2017-06-01 RX ORDER — PROMETHAZINE HYDROCHLORIDE 25 MG/1
12.5 TABLET ORAL ONCE AS NEEDED
Status: DISCONTINUED | OUTPATIENT
Start: 2017-06-01 | End: 2017-06-01 | Stop reason: HOSPADM

## 2017-06-01 RX ORDER — NALOXONE HCL 0.4 MG/ML
0.2 VIAL (ML) INJECTION AS NEEDED
Status: DISCONTINUED | OUTPATIENT
Start: 2017-06-01 | End: 2017-06-01 | Stop reason: HOSPADM

## 2017-06-01 RX ORDER — HYDROMORPHONE HYDROCHLORIDE 1 MG/ML
0.5 INJECTION, SOLUTION INTRAMUSCULAR; INTRAVENOUS; SUBCUTANEOUS
Status: DISCONTINUED | OUTPATIENT
Start: 2017-06-01 | End: 2017-06-01 | Stop reason: HOSPADM

## 2017-06-01 RX ORDER — MAGNESIUM HYDROXIDE 1200 MG/15ML
LIQUID ORAL AS NEEDED
Status: DISCONTINUED | OUTPATIENT
Start: 2017-06-01 | End: 2017-06-01 | Stop reason: HOSPADM

## 2017-06-01 RX ORDER — HYDROCODONE BITARTRATE AND ACETAMINOPHEN 7.5; 325 MG/1; MG/1
1 TABLET ORAL ONCE AS NEEDED
Status: COMPLETED | OUTPATIENT
Start: 2017-06-01 | End: 2017-06-01

## 2017-06-01 RX ORDER — PROPOFOL 10 MG/ML
VIAL (ML) INTRAVENOUS AS NEEDED
Status: DISCONTINUED | OUTPATIENT
Start: 2017-06-01 | End: 2017-06-01 | Stop reason: SURG

## 2017-06-01 RX ORDER — PROMETHAZINE HYDROCHLORIDE 25 MG/ML
12.5 INJECTION, SOLUTION INTRAMUSCULAR; INTRAVENOUS ONCE AS NEEDED
Status: DISCONTINUED | OUTPATIENT
Start: 2017-06-01 | End: 2017-06-01 | Stop reason: HOSPADM

## 2017-06-01 RX ORDER — DIPHENHYDRAMINE HYDROCHLORIDE 50 MG/ML
12.5 INJECTION INTRAMUSCULAR; INTRAVENOUS
Status: DISCONTINUED | OUTPATIENT
Start: 2017-06-01 | End: 2017-06-01 | Stop reason: HOSPADM

## 2017-06-01 RX ORDER — ONDANSETRON 2 MG/ML
INJECTION INTRAMUSCULAR; INTRAVENOUS AS NEEDED
Status: DISCONTINUED | OUTPATIENT
Start: 2017-06-01 | End: 2017-06-01 | Stop reason: SURG

## 2017-06-01 RX ORDER — FAMOTIDINE 10 MG/ML
20 INJECTION, SOLUTION INTRAVENOUS ONCE
Status: COMPLETED | OUTPATIENT
Start: 2017-06-01 | End: 2017-06-01

## 2017-06-01 RX ORDER — PROMETHAZINE HYDROCHLORIDE 25 MG/1
25 SUPPOSITORY RECTAL ONCE AS NEEDED
Status: DISCONTINUED | OUTPATIENT
Start: 2017-06-01 | End: 2017-06-01 | Stop reason: HOSPADM

## 2017-06-01 RX ORDER — ALBUTEROL SULFATE 2.5 MG/3ML
2.5 SOLUTION RESPIRATORY (INHALATION) ONCE AS NEEDED
Status: DISCONTINUED | OUTPATIENT
Start: 2017-06-01 | End: 2017-06-01 | Stop reason: HOSPADM

## 2017-06-01 RX ORDER — PROMETHAZINE HYDROCHLORIDE 25 MG/1
25 TABLET ORAL ONCE AS NEEDED
Status: DISCONTINUED | OUTPATIENT
Start: 2017-06-01 | End: 2017-06-01 | Stop reason: HOSPADM

## 2017-06-01 RX ORDER — FENTANYL CITRATE 50 UG/ML
50 INJECTION, SOLUTION INTRAMUSCULAR; INTRAVENOUS
Status: DISCONTINUED | OUTPATIENT
Start: 2017-06-01 | End: 2017-06-01 | Stop reason: HOSPADM

## 2017-06-01 RX ORDER — LIDOCAINE HYDROCHLORIDE 20 MG/ML
INJECTION, SOLUTION INFILTRATION; PERINEURAL AS NEEDED
Status: DISCONTINUED | OUTPATIENT
Start: 2017-06-01 | End: 2017-06-01 | Stop reason: SURG

## 2017-06-01 RX ORDER — SODIUM CHLORIDE, SODIUM LACTATE, POTASSIUM CHLORIDE, CALCIUM CHLORIDE 600; 310; 30; 20 MG/100ML; MG/100ML; MG/100ML; MG/100ML
9 INJECTION, SOLUTION INTRAVENOUS CONTINUOUS
Status: DISCONTINUED | OUTPATIENT
Start: 2017-06-01 | End: 2017-06-01 | Stop reason: HOSPADM

## 2017-06-01 RX ORDER — CEFAZOLIN SODIUM 2 G/100ML
2 INJECTION, SOLUTION INTRAVENOUS ONCE
Status: COMPLETED | OUTPATIENT
Start: 2017-06-01 | End: 2017-06-01

## 2017-06-01 RX ORDER — LABETALOL HYDROCHLORIDE 5 MG/ML
5 INJECTION, SOLUTION INTRAVENOUS
Status: DISCONTINUED | OUTPATIENT
Start: 2017-06-01 | End: 2017-06-01 | Stop reason: HOSPADM

## 2017-06-01 RX ORDER — HYDROMORPHONE HCL 110MG/55ML
PATIENT CONTROLLED ANALGESIA SYRINGE INTRAVENOUS AS NEEDED
Status: DISCONTINUED | OUTPATIENT
Start: 2017-06-01 | End: 2017-06-01 | Stop reason: SURG

## 2017-06-01 RX ORDER — FENTANYL CITRATE 50 UG/ML
INJECTION, SOLUTION INTRAMUSCULAR; INTRAVENOUS AS NEEDED
Status: DISCONTINUED | OUTPATIENT
Start: 2017-06-01 | End: 2017-06-01 | Stop reason: SURG

## 2017-06-01 RX ORDER — FLUMAZENIL 0.1 MG/ML
0.2 INJECTION INTRAVENOUS AS NEEDED
Status: DISCONTINUED | OUTPATIENT
Start: 2017-06-01 | End: 2017-06-01 | Stop reason: HOSPADM

## 2017-06-01 RX ORDER — PROMETHAZINE HYDROCHLORIDE 25 MG/ML
5 INJECTION, SOLUTION INTRAMUSCULAR; INTRAVENOUS
Status: DISCONTINUED | OUTPATIENT
Start: 2017-06-01 | End: 2017-06-01 | Stop reason: HOSPADM

## 2017-06-01 RX ORDER — HYDRALAZINE HYDROCHLORIDE 20 MG/ML
5 INJECTION INTRAMUSCULAR; INTRAVENOUS
Status: DISCONTINUED | OUTPATIENT
Start: 2017-06-01 | End: 2017-06-01 | Stop reason: HOSPADM

## 2017-06-01 RX ORDER — HYDROCODONE BITARTRATE AND ACETAMINOPHEN 5; 325 MG/1; MG/1
1-2 TABLET ORAL EVERY 4 HOURS PRN
Qty: 40 TABLET | Refills: 0 | Status: SHIPPED | OUTPATIENT
Start: 2017-06-01 | End: 2017-06-13

## 2017-06-01 RX ADMIN — HYDROCODONE BITARTRATE AND ACETAMINOPHEN 1 TABLET: 7.5; 325 TABLET ORAL at 15:04

## 2017-06-01 RX ADMIN — LIDOCAINE HYDROCHLORIDE 40 MG: 20 INJECTION, SOLUTION INFILTRATION; PERINEURAL at 12:52

## 2017-06-01 RX ADMIN — CEFAZOLIN SODIUM 2 G: 2 INJECTION, SOLUTION INTRAVENOUS at 12:48

## 2017-06-01 RX ADMIN — HYDROMORPHONE HYDROCHLORIDE 0.5 MG: 2 INJECTION, SOLUTION INTRAMUSCULAR; INTRAVENOUS; SUBCUTANEOUS at 14:19

## 2017-06-01 RX ADMIN — MIDAZOLAM 1 MG: 1 INJECTION INTRAMUSCULAR; INTRAVENOUS at 11:04

## 2017-06-01 RX ADMIN — DEXAMETHASONE SODIUM PHOSPHATE 4 MG: 10 INJECTION INTRAMUSCULAR; INTRAVENOUS at 12:57

## 2017-06-01 RX ADMIN — FENTANYL CITRATE 50 MCG: 50 INJECTION INTRAMUSCULAR; INTRAVENOUS at 13:50

## 2017-06-01 RX ADMIN — FENTANYL CITRATE 50 MCG: 50 INJECTION INTRAMUSCULAR; INTRAVENOUS at 12:52

## 2017-06-01 RX ADMIN — ONDANSETRON 4 MG: 2 INJECTION INTRAMUSCULAR; INTRAVENOUS at 12:57

## 2017-06-01 RX ADMIN — PROPOFOL 150 MG: 10 INJECTION, EMULSION INTRAVENOUS at 12:52

## 2017-06-01 RX ADMIN — SODIUM CHLORIDE, POTASSIUM CHLORIDE, SODIUM LACTATE AND CALCIUM CHLORIDE: 600; 310; 30; 20 INJECTION, SOLUTION INTRAVENOUS at 14:30

## 2017-06-01 RX ADMIN — FAMOTIDINE 20 MG: 10 INJECTION, SOLUTION INTRAVENOUS at 11:04

## 2017-06-01 RX ADMIN — SODIUM CHLORIDE, POTASSIUM CHLORIDE, SODIUM LACTATE AND CALCIUM CHLORIDE 9 ML/HR: 600; 310; 30; 20 INJECTION, SOLUTION INTRAVENOUS at 11:04

## 2017-06-01 NOTE — H&P (VIEW-ONLY)
CC: Follow up perianal fistula    HPI: The patient is a very pleasant 51-year-old female with a history of perirectal abscess that was drained on September 2016.  She also has history of a large serratus polyp at the ileocecal valve that has been removed piecemeal twice.  She is here today complaining of recurrent episodes of pain, swelling, purulent and bloody drainage at the prior abscess site.  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  She continues to have a small opening in the left perianal area.  These episodes are intermittent and cause pain.  She usually apply manual pressure to the area and drains purulent as well as serosanguineous fluid.  She denies any fevers or chills.      PMH: Sinusitis, colonic polyps, perirectal abscess, arthritis     PSH: Cervical fusion, colonoscopy ×2, endometrial ablation, incision and drainage of perirectal abscess, wrist arthroplasty    Meds and allergies review    Review of systems    A 12 point review of system was performed and all of them were negative    Family history and social history: Diabetes.  She is a smoker.    Physical exam    The patient is alert and oriented ×3, no acute distress  Breathing comfortable without any effort  Abdomen is soft and nontender  Perirectal examination show a small skin opening on the left lateral aspect of the perianal area.  On the rectal examination there is a tonic anal sphincter.  There is no masses palpated area there is a thickened area at the anterior aspect of the anal canal.  There is no purulence drainage.  There is no fluctuance or abscess is    Assessment and plan    The patient is a very pleasant 51-year-old female with the perianal fistula and history of colon polyps that were removed piecemeal 6 months ago.  I discussed with the patient about her disease and the need of performing an exam under anesthesia to better assess her fistula.  She may need to have a fistulotomy versus seton placement.   She is due for colonoscopy due to piecemeal polypectomy 6 months ago.    - Anal exam under anesthesia and colonoscopy to reschedule  - Bowel preparation  - Encourage to stop smoking    The risk and benefits of fistulotomy including bleeding infection and anal incontinence were explained to the patient.  Also the risk of bleeding infection and perforation during colonoscopy were explained to her, she verbalized understanding and agreed to the plan     Ej Sanford MD  General, Minimally Invasive and Endoscopic Surgery  [facility]    [address], [address]  [place], [place], [address]  P: [phone]  F: [phone]

## 2017-06-01 NOTE — PLAN OF CARE
Problem: Patient Care Overview (Adult)  Goal: Plan of Care Review  Outcome: Outcome(s) achieved Date Met:  06/01/17 06/01/17 1544   Coping/Psychosocial Response Interventions   Plan Of Care Reviewed With patient;daughter   Outcome Evaluation   Outcome Summary/Follow up Plan readdy for d/c home       Goal: Adult Individualization and Mutuality  Outcome: Outcome(s) achieved Date Met:  06/01/17  Goal: Discharge Needs Assessment  Outcome: Outcome(s) achieved Date Met:  06/01/17    Problem: Perioperative Period (Adult)  Goal: Signs and Symptoms of Listed Potential Problems Will be Absent or Manageable (Perioperative Period)  Outcome: Outcome(s) achieved Date Met:  06/01/17

## 2017-06-01 NOTE — PLAN OF CARE
Problem: Patient Care Overview (Adult)  Goal: Plan of Care Review  Outcome: Ongoing (interventions implemented as appropriate)    06/01/17 1508   Coping/Psychosocial Response Interventions   Plan Of Care Reviewed With patient   Outcome Evaluation   Outcome Summary/Follow up Plan PATIENT VITAL SIGNS STABLE. PAIN LEVEL TOLERABLE. PATIENT MOVING TO PHASE 2         Problem: Perioperative Period (Adult)  Goal: Signs and Symptoms of Listed Potential Problems Will be Absent or Manageable (Perioperative Period)  Outcome: Ongoing (interventions implemented as appropriate)    06/01/17 1508   Perioperative Period   Problems Assessed (Perioperative Period) all

## 2017-06-01 NOTE — PLAN OF CARE
Problem: Patient Care Overview (Adult)  Goal: Plan of Care Review  Outcome: Ongoing (interventions implemented as appropriate)    06/01/17 1044   Coping/Psychosocial Response Interventions   Plan Of Care Reviewed With patient   Patient Care Overview   Progress no change       Goal: Adult Individualization and Mutuality  Outcome: Ongoing (interventions implemented as appropriate)    06/01/17 1044   Individualization   Patient Specific Preferences Call pt Livia   Patient Specific Goals No infection after surgery.   Patient Specific Interventions Teach good hand hygiene.       Goal: Discharge Needs Assessment  Outcome: Ongoing (interventions implemented as appropriate)    06/01/17 1044   Discharge Needs Assessment   Concerns To Be Addressed denies needs/concerns at this time   Current Health   Anticipated Changes Related to Illness none   Self-Care   Equipment Currently Used at Home none         Problem: Perioperative Period (Adult)  Goal: Signs and Symptoms of Listed Potential Problems Will be Absent or Manageable (Perioperative Period)  Outcome: Ongoing (interventions implemented as appropriate)    06/01/17 1044   Perioperative Period   Problems Present (Perioperative Period) none

## 2017-06-01 NOTE — BRIEF OP NOTE
RECTAL EXAM UNDER ANESTHESIA  Procedure Note    Rosemarie Gandhi  6/1/2017    Pre-op Diagnosis:   Colon polyp [K63.5]  Perirectal fistula [K60.4]    Post-op Diagnosis:     Post-Op Diagnosis Codes:     * Colon polyp [K63.5]     * Perirectal fistula [K60.4]    Procedure/CPT® Codes:      Procedure(s):  COLONOSCOPY, RECTAL EXAM UNDER ANESTHESIA, FISTULOTOMY, EXTERNAL HEMORRHOIDECTOMY     Surgeon(s):  Ej Sanford MD    Anesthesia: General    Staff:   Circulator: Cierra Powers RN; Quincy Lambert RN  Scrub Person: Arnol Ugarte  Endo Technician: Alexandra Roche  Endo Nurse: Saba Quintanilla RN    Estimated Blood Loss: *No blood loss documented*  Urine Voided: * No values recorded between 6/1/2017 12:43 PM and 6/1/2017  2:35 PM *    Specimens:                  ID Type Source Tests Collected by Time Destination   A : ILEOCECAL VALVE POLYP Tissue Large Intestine, Cecum TISSUE EXAM Ej Sanford MD 6/1/2017 1326    B : RECTAL SIGMOID POLYPS Tissue Large Intestine, Sigmoid Colon TISSUE EXAM Ej Sanford MD 6/1/2017 1342    C : LEFT LATERAL HEMORRHOID Tissue Hemorrhoid(s) TISSUE EXAM Ej Sanford MD 6/1/2017 1411          Drains: None          Findings: Ileocecal valve polyp. Superficial perianal fistula, external hemorrhoids    Complications: None      Ej Sanford MD     Date: 6/1/2017  Time: 2:40 PM

## 2017-06-01 NOTE — ANESTHESIA POSTPROCEDURE EVALUATION
Patient: Rosemarie Gandhi    Procedure Summary     Date Anesthesia Start Anesthesia Stop Room / Location    06/01/17 [id number]  [facility] / [facility]       Procedure Diagnosis Surgeon Provider    COLONOSCOPY, RECTAL EXAM UNDER ANESTHESIA, FISTULOTOMY, EXTERNAL HEMORRHOIDECTOMY  (N/A Rectum) Colon polyp; Perirectal fistula  (Colon polyp [K63.5]; Perirectal fistula [K60.4]) MD Alma Martinez MD          Anesthesia Type: general  Last vitals  /78 (06/01/17 1511)    Temp 36.6 °C (97.8 °F) (06/01/17 1505)    Pulse 71 (06/01/17 1511)   Resp (!) 6 (06/01/17 1511)    SpO2 98 % (06/01/17 1505)      Post Anesthesia Care and Evaluation    Patient location during evaluation: PACU  Patient participation: complete - patient participated  Level of consciousness: awake and alert  Pain score: 0  Pain management: adequate  Airway patency: patent  Anesthetic complications: No anesthetic complications    Cardiovascular status: acceptable  Respiratory status: acceptable  Hydration status: acceptable

## 2017-06-01 NOTE — ANESTHESIA PROCEDURE NOTES
Airway  Urgency: elective    Airway not difficult    General Information and Staff    Patient location during procedure: OR  Anesthesiologist: LEV TAVAREZ  CRNA: TY DARBY    Indications and Patient Condition  Indications for airway management: airway protection    Preoxygenated: yes  MILS maintained throughout  Mask difficulty assessment: 0 - not attempted    Final Airway Details  Final airway type: supraglottic airway      Successful airway: classic  Size 4    Number of attempts at approach: 1    Additional Comments  Placed with ease. Vent with ease. Teeth as pre-op. Secured to face.

## 2017-06-01 NOTE — ANESTHESIA PREPROCEDURE EVALUATION
Anesthesia Evaluation     no history of anesthetic complications:         Airway   Mallampati: I  TM distance: >3 FB  Neck ROM: full  no difficulty expected  Dental - normal exam     Pulmonary    Cardiovascular     (+) hyperlipidemia  (-) hypertension, dysrhythmias, angina      Neuro/Psych  (-) dizziness/light headedness, syncope  GI/Hepatic/Renal/Endo      ROS Comment: Rectal abcess    Musculoskeletal     Abdominal    Substance History      OB/GYN          Other                                        Anesthesia Plan    ASA 2     general     intravenous induction   Anesthetic plan and risks discussed with patient.

## 2017-06-02 LAB
CYTO UR: NORMAL
LAB AP CASE REPORT: NORMAL
Lab: NORMAL
PATH REPORT.FINAL DX SPEC: NORMAL
PATH REPORT.GROSS SPEC: NORMAL

## 2017-06-02 NOTE — OP NOTE
DATE OF PROCEDURE:  06/01/2017     SURGEON: Ej Sanford MD      ASSISTANT: None.     PREOPERATIVE DIAGNOSES:   1.  Ileocecal valve/colon polyp removed piecemeal 6 months ago.   2.  Perianal fistula.    3.  Hemorrhoids.     POSTOPERATIVE DIAGNOSES:   1.  Ileocecal valve polyp x1.    2.  Rectosigmoid polyp x2.   3.  Perianal fistula, superficial.     4.  External hemorrhoids.      PROCEDURE PERFORMED:   1) Colonoscopy with hot snare and cold forceps polypectomy and 3 hemostatic clips placement.   2) Perianal fistulotomy  3) External hemorrhoidectomy of one column    ANESTHESIA: LMA.     COMPLICATIONS: None.     ESTIMATED BLOOD LOSS: Minimal.     CONDITION: Stable to recovery.     INDICATIONS FOR PROCEDURE: The patient is a very pleasant 51-year-old female who is well known to myself because of history of prior colonoscopies as well as incision and drainage of perirectal abscess. She was found to have recurrent perirectal abscess and on physical examination, there was an area concerning for perianal fistula. She also had a colonoscopy 6 months ago where she was found to have a large ileocecal valve polyp that was removed piecemeal and was found to be a sebaceous adenoma. The patient was recommended to undergo colonoscopy for followup in 6 months. Also, exam under anesthesia was planned to assess for her perianal fistula. The risks and benefits of the procedure were explained to the patient and she verbalized understanding and agreed with the plan.     DESCRIPTION OF PROCEDURE: The patient was taken to the operating room where she was placed on the OR table in the supine position. Preoperative antibiotics were given and SCDs were placed. The patient underwent LMA .She was positioned in the left lateral decubitus. A digital rectal examination revealed no masses.  The endoscope was inserted through the patient's rectum and advanced all the way to the cecum. At the level of the ileocecal valve, there was a 2 x 1 cm,  flat polyp. The polyp was at the location of the prior polyp. I decided to perform polypectomy by injecting saline in the submucosa and elevating the mucosa. With a combination of snare and forceps biopsy, the polyp was completely removed. There was a calcified hard area at the base of the polyp in the subcutaneous tissue. I decided to achieve hemostasis and close the gap in the mucosa with 3 hemostatic clips. The colonoscope was then withdrawn slowly, visualizing the appendiceal orifice that was normal as well as the ascending colon, hepatic flexure, and the transverse colon were normal. The splenic flexure and descending colon were normal. At the level of the rectosigmoid junction, there were 2 small hyperplastic polyps that were removed with biopsy forceps. The area was minimal. The scope was removed down to the rectum. On retroflexion, there was evidence of internal hemorrhoids, but there was no evidence of fistulous tracts. I then decided to withdraw the scope and continue the procedure with exam under anesthesia. The patient was positioned again supine with candy cane stirrups. The perianal area was inspected. She has circumferential external hemorrhoids.  At the level of the left lateral area of the perianal area, there was a fistulous tract. This was probed with a lacrimal probe and this was found to have 2 trajectories, one that would go to the level of the anoderm at the 1 o'clock position.  Another small tract was found to be deep in the gluteal and subcutaneous fat. There was no evidence there of fistulous opening to the rectum. I then proceeded to perform a fistulotomy with Bovie electrocautery. Close to the anoderm, I probed again the fistulous tract and it was found to go through superficial fibers of the subcutaneous external anal sphincter. These were transected. There were external hemorrhoids all over the anal canal and I decided to remove the hemorrhoids in the left lateral aspect at the area of  the fistulotomy. The area was then irrigated. There was no evidence of bleeding. The external and internal sphincter were recognized and were preserved. The fistulous base was electrocoagulated to prevent recurrent abscesses. The fistulous tract at the subcutaneous gluteal area was electrocoagulated. Then, I proceeded to irrigate the fistulous opening. Packing was performed with Iodoform gauze, one-quarter inch. An anal block was performed with Exparel. Dressings with 4 x 4 and ABD were placed. The patient tolerated the procedure well and she was transferred to the recovery area in stable condition.         JENNIFFER Sanchez:simon  D:   06/01/2017 14:50:29  T:   06/01/2017 17:40:27  Job ID:   [id number]  Document ID:   [id number]  cc:

## 2017-06-08 ENCOUNTER — TELEPHONE (OUTPATIENT)
Dept: SURGERY | Facility: CLINIC | Age: 52
End: 2017-06-08

## 2017-06-13 ENCOUNTER — OFFICE VISIT (OUTPATIENT)
Dept: SURGERY | Facility: CLINIC | Age: 52
End: 2017-06-13

## 2017-06-13 DIAGNOSIS — Z09 POSTOP CHECK: Primary | ICD-10-CM

## 2017-06-13 PROCEDURE — 99024 POSTOP FOLLOW-UP VISIT: CPT | Performed by: SURGERY

## 2017-06-13 RX ORDER — HYDROCODONE BITARTRATE AND ACETAMINOPHEN 5; 325 MG/1; MG/1
1-2 TABLET ORAL EVERY 4 HOURS PRN
Qty: 20 TABLET | Refills: 0 | Status: SHIPPED | OUTPATIENT
Start: 2017-06-13 | End: 2017-09-19

## 2017-06-13 RX ORDER — METRONIDAZOLE 500 MG/1
500 TABLET ORAL 3 TIMES DAILY
Qty: 30 TABLET | Refills: 0 | Status: SHIPPED | OUTPATIENT
Start: 2017-06-13 | End: 2017-06-23

## 2017-06-13 NOTE — PROGRESS NOTES
Cc: Follow-up after colonoscopy and fistulotomy for perianal fistula    S: The patient reports she has been doing fine.  She has been having pain especially when performing dressing changes.  She has been doing sitz bath that have been helping.  She reports and mucousy drainage from the area.  She reports no fevers or chills.  She reports no urinary retention.  She has been having regular bowel movements.  There is no incontinence or stool seepage    O: On physical exam there is an open wound at the perianal area at the 1 o'clock position.  There is cannulation tissue at the base of the wound.  There is small amount of fibrinous exudate.  There is no evidence of purulent drainage.  There is no erythema and there is small amount of tenderness over the area.    Pathology report:   Final Diagnosis   1: ILEOCECAL VALVE, POLYPECTOMY:  FRAGMENTS OF HYPERPLASTIC POLYP.     2: COLON, RECTOSIGMOID, POLYPECTOMY:  FRAGMENTS OF HYPERPLASTIC POLYP.     3: ANAL TISSUE, LEFT LATERAL, HEMORRHOIDECTOMY:  DILATED SUBMUCOSAL BLOOD VESSELS, CONSISTENT WITH HEMORRHOIDS.     Assessment and plan    The patient is a very pleasant 51-year-old female status post perianal fistulotomy and colonoscopy.  As of today she had 2 prior colonoscopies.  These were performed because of a large polyp at the ileocecal valve that was resected piecemeal.  The pathology of the first colonoscopy showed a hyperplastic polyps and the second one asked serrateous polyp.  During last colonoscopy and polyp in the similar location was found.  Pathology was found to be a hyperplastic polyp.  I discussed with the patient about the expected follow-up.  I think the wound looks great but there is small amount of mild all her that is expected for the area.  I think that because of the large polyp she will need to have a colonoscopy for follow-up in 1 year.  If the polyps remain at the same location then I will need to discuss with her about performing a right  hemicolectomy.  For now she should continue either wet-to-dry dressing changes twice a day or performing sitz bath and leaving a dry dressing in place.  The wound will slowly heal    - Wet-to-dry dressing changes at least twice a day and sitz bath 3 times a day and after every bowel movement  - I refilled Norco 5/3/25 and gave her 20 tabs for pain  - I prescribed 10 days of Flagyl to decrease bacterial colonization in the perianal area  - Colonoscopy in one year  - Follow-up in my office in 2 weeks    The patient verbalized understanding and agreed with the plan

## 2017-06-28 ENCOUNTER — OFFICE VISIT (OUTPATIENT)
Dept: SURGERY | Facility: CLINIC | Age: 52
End: 2017-06-28

## 2017-06-28 ENCOUNTER — TELEPHONE (OUTPATIENT)
Dept: SURGERY | Facility: CLINIC | Age: 52
End: 2017-06-28

## 2017-06-28 DIAGNOSIS — Z09 POSTOP CHECK: Primary | ICD-10-CM

## 2017-06-28 PROCEDURE — 99024 POSTOP FOLLOW-UP VISIT: CPT | Performed by: SURGERY

## 2017-06-28 NOTE — PROGRESS NOTES
Cc: Follow-up after colonoscopy and fistulotomy for perianal fistula    S: The patient reports almost resolution of her pain, no drainage. Has been doing sitz baths. She reports no fevers or chills. She reports no urinary retention. She has been having regular bowel movements. There is no incontinence or stool seepage    O: on perianal exam there's is healing incision at 1 o clock position, no drainage, the wound it's almost healed.   No erythema    Assessment and plan     The patient is a very pleasant 51-year-old female status post perianal fistulotomy and colonoscopy. As of today she had 2 prior colonoscopies. These were performed because of a large polyp at the ileocecal valve that was resected piecemeal. The pathology of the first colonoscopy showed a hyperplastic polyps and the second one was a serrateous polyp at the same location. During last colonoscopy and polyp in the similar location was found. Pathology was found to be a hyperplastic polyp. I discussed with the patient about the expected follow-up. I think that she needs to have a colonoscopy for follow-up in 1 year. If the polyps recurs at the same location then I will discuss with her about the possibility of performing a right hemicolectomy. The wound is almost completely healed.     - Sitz bath 3 times a day and after every bowel movement  - Colonoscopy in one year  - Follow up in my office as needed.     Ej Sanford MD  General, Minimally Invasive and Endoscopic Surgery  [facility]    [address], [address]  [place], [place], [address]  P: [phone]  F: [phone]

## 2017-09-19 ENCOUNTER — OFFICE VISIT (OUTPATIENT)
Dept: SURGERY | Facility: CLINIC | Age: 52
End: 2017-09-19

## 2017-09-19 VITALS — BODY MASS INDEX: 33.7 KG/M2 | HEIGHT: 70 IN | OXYGEN SATURATION: 97 % | WEIGHT: 235.4 LBS | HEART RATE: 78 BPM

## 2017-09-19 DIAGNOSIS — K60.3 PERIANAL FISTULA: Primary | ICD-10-CM

## 2017-09-19 PROCEDURE — 99214 OFFICE O/P EST MOD 30 MIN: CPT | Performed by: SURGERY

## 2017-09-19 RX ORDER — CEFAZOLIN SODIUM 2 G/100ML
2 INJECTION, SOLUTION INTRAVENOUS ONCE
Status: CANCELLED | OUTPATIENT
Start: 2017-09-29 | End: 2017-09-19

## 2017-09-20 NOTE — PROGRESS NOTES
CC: Recurrent perirectal abscess     HPI: The patient is a very pleasant 53 yo female that is here to follow up for recurrent perirectal abscess.  The patient has history of perirectal abscess and a superficial perianal fistula status post superficial fistulotomy on June 2017.  The patient reports recurrent episodes of swelling, pain and drainage at the external aspect of the wound.  This happened approximately 4-5 times after her procedure.  This usually starts as a small lump at the external aspect of her perianal incision and is followed by pain and small amount of purulent drainage that is followed by bloody drainage.  The drainage is very small amount.  Pain usually improves own.  She denies any fevers or any chills.  She denies any recent change in bowel habits or rectal bleeding.  He is due for colonoscopy next year in June for surveillance of a cecal hyperplastic polyp     PMH: Hyperplastic/serrateous cecal polyp, perianal fistula, hemorrhoids, dysmenorrhea, cervical radiculopathy     PSH: Wrist surgery, cervical fusion, partial hysterectomy, endometrial ablation, colonoscopy ×3, incision and drainage of perirectal abscess.  Superficial fistulotomy and hemorrhoidectomy    MEDS: Multivitamins, Advil and gabapentin     ALL: No known drug allergies    FH and SH: Family history of diabetes.  She is  has 2 kids smokes daily     ROS:   Constitutional: denies any weight changes, fatigue or weakness.  Eyes: : denies blurred or double vision  Cardiovascular: denies chest pain, palpitations, edemas.  Respiratory: denies cough, sputum, SOB.  Gastrointestinal: denies N&V, abd pain, diarrhea, constipation.  Genitourinary: denies dysuria, frequency.  Endocrine: denies cold intolerance, lethargy and flushing.  Hem: denies excessive bruising and postop bleeding.  Musculoskeletal: denies weakness, joint swelling, pain or stiffness.  Neuro: denies seizures, CVA, paresthesia, or peripheral neuropathy.   Skin: denies  change in nevi, rashes, masses.     PE: The patient is afebrile, vital signs are stable  Alert and oriented ×3, no acute distress.  Head is normocephalic and atraumatic.  Neck is supple there is no thyroimegaly or lymphadenopathy  Chest is clear bilaterally there is no added sounds  Regular rate and rhythm, no murmurs  Abdomen is soft and nontender, is nondistended, bowel sounds are positive.  There is no rebound or guarding is and there is no peritoneal signs.  No clubbing cyanosis or edema in lower or upper extremities  Over the perianal area over the right side that is well-healed prior fistulotomy incision.  There is a small pit at the lateral aspect of the incision with small amount of pus with skin over.  There is no active drainage.  There is no tenderness.  There is no abscess palpated.  Digital rectal examination show an empty rectal vault and no masses     Assessment and plan    The patient is a very pleasant 52-year-old female with history of ileocecal valve colonic polyp that I had been following up with colonoscopies.  She will need to have a colonoscopy 1 year.  She has recurrent mild abscesses at the lateral aspect of the prior fistulotomy site.  There is a chance patient may have a recurrent fistulous tract that may be deeper than the one that was treated.  I discussed with the patient about treatment options.  I recommend that she undergoes rectal exam under anesthesia and possible fistulotomy.  Risk and benefits of the procedure including bleeding infection, fecal incontinence and pelvic sepsis were explained to the patient and she verbalized understanding and agree with the plan    - Rectal exam under anesthesia, possible fistulotomy  - Colonoscopy on June 2018     Ej Sanford MD  General, Minimally Invasive and Endoscopic Surgery  [facility]     [address] [place] [address], [address]  [place], [place], [address]  P: [phone]  F: [phone]

## 2017-09-26 ENCOUNTER — APPOINTMENT (OUTPATIENT)
Dept: PREADMISSION TESTING | Facility: HOSPITAL | Age: 52
End: 2017-09-26

## 2017-09-26 ENCOUNTER — TELEPHONE (OUTPATIENT)
Dept: SURGERY | Facility: CLINIC | Age: 52
End: 2017-09-26

## 2017-09-26 VITALS
BODY MASS INDEX: 33.64 KG/M2 | RESPIRATION RATE: 16 BRPM | SYSTOLIC BLOOD PRESSURE: 133 MMHG | DIASTOLIC BLOOD PRESSURE: 86 MMHG | OXYGEN SATURATION: 96 % | HEART RATE: 92 BPM | WEIGHT: 235 LBS | HEIGHT: 70 IN | TEMPERATURE: 98.2 F

## 2017-09-26 LAB
DEPRECATED RDW RBC AUTO: 44.1 FL (ref 37–54)
ERYTHROCYTE [DISTWIDTH] IN BLOOD BY AUTOMATED COUNT: 13.3 % (ref 11.7–13)
HCT VFR BLD AUTO: 42.4 % (ref 35.6–45.5)
HGB BLD-MCNC: 14 G/DL (ref 11.9–15.5)
MCH RBC QN AUTO: 30.3 PG (ref 26.9–32)
MCHC RBC AUTO-ENTMCNC: 33 G/DL (ref 32.4–36.3)
MCV RBC AUTO: 91.8 FL (ref 80.5–98.2)
PLATELET # BLD AUTO: 303 10*3/MM3 (ref 140–500)
PMV BLD AUTO: 10 FL (ref 6–12)
RBC # BLD AUTO: 4.62 10*6/MM3 (ref 3.9–5.2)
WBC NRBC COR # BLD: 15.23 10*3/MM3 (ref 4.5–10.7)

## 2017-09-26 PROCEDURE — 85027 COMPLETE CBC AUTOMATED: CPT | Performed by: SURGERY

## 2017-09-26 PROCEDURE — 36415 COLL VENOUS BLD VENIPUNCTURE: CPT

## 2017-09-26 RX ORDER — AMOXICILLIN AND CLAVULANATE POTASSIUM 875; 125 MG/1; MG/1
1 TABLET, FILM COATED ORAL 2 TIMES DAILY
Qty: 20 TABLET | Refills: 0 | Status: SHIPPED | OUTPATIENT
Start: 2017-09-26 | End: 2017-09-27

## 2017-09-27 RX ORDER — AMOXICILLIN AND CLAVULANATE POTASSIUM 875; 125 MG/1; MG/1
1 TABLET, FILM COATED ORAL 2 TIMES DAILY
Qty: 20 TABLET | Refills: 0 | OUTPATIENT
Start: 2017-09-27 | End: 2017-10-07

## 2017-09-29 ENCOUNTER — HOSPITAL ENCOUNTER (OUTPATIENT)
Facility: HOSPITAL | Age: 52
Setting detail: HOSPITAL OUTPATIENT SURGERY
Discharge: HOME | End: 2017-09-29
Attending: SURGERY | Admitting: SURGERY

## 2017-09-29 ENCOUNTER — ANESTHESIA EVENT (OUTPATIENT)
Dept: PERIOP | Facility: HOSPITAL | Age: 52
End: 2017-09-29

## 2017-09-29 ENCOUNTER — ANESTHESIA (OUTPATIENT)
Dept: PERIOP | Facility: HOSPITAL | Age: 52
End: 2017-09-29

## 2017-09-29 VITALS
RESPIRATION RATE: 16 BRPM | WEIGHT: 235 LBS | OXYGEN SATURATION: 96 % | SYSTOLIC BLOOD PRESSURE: 125 MMHG | DIASTOLIC BLOOD PRESSURE: 76 MMHG | TEMPERATURE: 97.6 F | HEART RATE: 73 BPM | BODY MASS INDEX: 33.72 KG/M2

## 2017-09-29 DIAGNOSIS — K60.3 PERIANAL FISTULA: ICD-10-CM

## 2017-09-29 PROCEDURE — 25010000002 DEXAMETHASONE PER 1 MG: Performed by: ANESTHESIOLOGY

## 2017-09-29 PROCEDURE — 25010000002 MIDAZOLAM PER 1 MG: Performed by: ANESTHESIOLOGY

## 2017-09-29 PROCEDURE — 25010000002 FENTANYL CITRATE (PF) 100 MCG/2ML SOLUTION: Performed by: NURSE ANESTHETIST, CERTIFIED REGISTERED

## 2017-09-29 PROCEDURE — 25010000002 PROPOFOL 10 MG/ML EMULSION: Performed by: ANESTHESIOLOGY

## 2017-09-29 PROCEDURE — 25010000002 ONDANSETRON PER 1 MG: Performed by: ANESTHESIOLOGY

## 2017-09-29 PROCEDURE — 46275 REMOVE ANAL FIST INTER: CPT | Performed by: SURGERY

## 2017-09-29 PROCEDURE — 25010000003 CEFAZOLIN IN DEXTROSE 2-4 GM/100ML-% SOLUTION: Performed by: SURGERY

## 2017-09-29 RX ORDER — ACETAMINOPHEN 325 MG/1
650 TABLET ORAL ONCE
Status: COMPLETED | OUTPATIENT
Start: 2017-09-29 | End: 2017-09-29

## 2017-09-29 RX ORDER — DOCUSATE SODIUM 100 MG/1
100 CAPSULE, LIQUID FILLED ORAL 2 TIMES DAILY PRN
Status: DISCONTINUED | OUTPATIENT
Start: 2017-09-29 | End: 2017-09-29 | Stop reason: HOSPADM

## 2017-09-29 RX ORDER — PROMETHAZINE HYDROCHLORIDE 25 MG/1
25 TABLET ORAL ONCE AS NEEDED
Status: DISCONTINUED | OUTPATIENT
Start: 2017-09-29 | End: 2017-09-29 | Stop reason: HOSPADM

## 2017-09-29 RX ORDER — LIDOCAINE HYDROCHLORIDE 10 MG/ML
0.5 INJECTION, SOLUTION EPIDURAL; INFILTRATION; INTRACAUDAL; PERINEURAL ONCE AS NEEDED
Status: DISCONTINUED | OUTPATIENT
Start: 2017-09-29 | End: 2017-09-29 | Stop reason: HOSPADM

## 2017-09-29 RX ORDER — ONDANSETRON 2 MG/ML
INJECTION INTRAMUSCULAR; INTRAVENOUS AS NEEDED
Status: DISCONTINUED | OUTPATIENT
Start: 2017-09-29 | End: 2017-09-29 | Stop reason: SURG

## 2017-09-29 RX ORDER — SODIUM CHLORIDE 0.9 % (FLUSH) 0.9 %
1-10 SYRINGE (ML) INJECTION AS NEEDED
Status: DISCONTINUED | OUTPATIENT
Start: 2017-09-29 | End: 2017-09-29 | Stop reason: HOSPADM

## 2017-09-29 RX ORDER — HYDRALAZINE HYDROCHLORIDE 20 MG/ML
5 INJECTION INTRAMUSCULAR; INTRAVENOUS
Status: DISCONTINUED | OUTPATIENT
Start: 2017-09-29 | End: 2017-09-29 | Stop reason: HOSPADM

## 2017-09-29 RX ORDER — FENTANYL CITRATE 50 UG/ML
50 INJECTION, SOLUTION INTRAMUSCULAR; INTRAVENOUS
Status: DISCONTINUED | OUTPATIENT
Start: 2017-09-29 | End: 2017-09-29 | Stop reason: HOSPADM

## 2017-09-29 RX ORDER — FENTANYL CITRATE 50 UG/ML
INJECTION, SOLUTION INTRAMUSCULAR; INTRAVENOUS AS NEEDED
Status: DISCONTINUED | OUTPATIENT
Start: 2017-09-29 | End: 2017-09-29 | Stop reason: SURG

## 2017-09-29 RX ORDER — NALOXONE HCL 0.4 MG/ML
0.4 VIAL (ML) INJECTION AS NEEDED
Status: DISCONTINUED | OUTPATIENT
Start: 2017-09-29 | End: 2017-09-29 | Stop reason: HOSPADM

## 2017-09-29 RX ORDER — HYDROMORPHONE HYDROCHLORIDE 1 MG/ML
0.5 INJECTION, SOLUTION INTRAMUSCULAR; INTRAVENOUS; SUBCUTANEOUS
Status: DISCONTINUED | OUTPATIENT
Start: 2017-09-29 | End: 2017-09-29 | Stop reason: HOSPADM

## 2017-09-29 RX ORDER — CEFAZOLIN SODIUM 2 G/100ML
2 INJECTION, SOLUTION INTRAVENOUS ONCE
Status: COMPLETED | OUTPATIENT
Start: 2017-09-29 | End: 2017-09-29

## 2017-09-29 RX ORDER — MAGNESIUM HYDROXIDE 1200 MG/15ML
LIQUID ORAL AS NEEDED
Status: DISCONTINUED | OUTPATIENT
Start: 2017-09-29 | End: 2017-09-29 | Stop reason: HOSPADM

## 2017-09-29 RX ORDER — PROPOFOL 10 MG/ML
VIAL (ML) INTRAVENOUS AS NEEDED
Status: DISCONTINUED | OUTPATIENT
Start: 2017-09-29 | End: 2017-09-29 | Stop reason: SURG

## 2017-09-29 RX ORDER — OXYCODONE AND ACETAMINOPHEN 7.5; 325 MG/1; MG/1
1 TABLET ORAL EVERY 4 HOURS PRN
Qty: 40 TABLET | Refills: 0 | Status: SHIPPED | OUTPATIENT
Start: 2017-09-29 | End: 2017-11-01

## 2017-09-29 RX ORDER — DEXAMETHASONE SODIUM PHOSPHATE 10 MG/ML
INJECTION INTRAMUSCULAR; INTRAVENOUS AS NEEDED
Status: DISCONTINUED | OUTPATIENT
Start: 2017-09-29 | End: 2017-09-29 | Stop reason: SURG

## 2017-09-29 RX ORDER — NALBUPHINE HCL 10 MG/ML
10 AMPUL (ML) INJECTION EVERY 4 HOURS PRN
Status: DISCONTINUED | OUTPATIENT
Start: 2017-09-29 | End: 2017-09-29 | Stop reason: HOSPADM

## 2017-09-29 RX ORDER — PROMETHAZINE HYDROCHLORIDE 25 MG/1
12.5 TABLET ORAL ONCE AS NEEDED
Status: DISCONTINUED | OUTPATIENT
Start: 2017-09-29 | End: 2017-09-29 | Stop reason: HOSPADM

## 2017-09-29 RX ORDER — ACETAMINOPHEN 650 MG/1
650 SUPPOSITORY RECTAL ONCE AS NEEDED
Status: DISCONTINUED | OUTPATIENT
Start: 2017-09-29 | End: 2017-09-29 | Stop reason: HOSPADM

## 2017-09-29 RX ORDER — PROMETHAZINE HYDROCHLORIDE 25 MG/ML
6.25 INJECTION, SOLUTION INTRAMUSCULAR; INTRAVENOUS ONCE AS NEEDED
Status: DISCONTINUED | OUTPATIENT
Start: 2017-09-29 | End: 2017-09-29 | Stop reason: HOSPADM

## 2017-09-29 RX ORDER — MIDAZOLAM HYDROCHLORIDE 1 MG/ML
1 INJECTION INTRAMUSCULAR; INTRAVENOUS
Status: DISCONTINUED | OUTPATIENT
Start: 2017-09-29 | End: 2017-09-29 | Stop reason: HOSPADM

## 2017-09-29 RX ORDER — AMOXICILLIN 250 MG
2 CAPSULE ORAL DAILY PRN
Qty: 30 TABLET | Refills: 1 | Status: SHIPPED | OUTPATIENT
Start: 2017-09-29 | End: 2018-09-29

## 2017-09-29 RX ORDER — FAMOTIDINE 10 MG/ML
20 INJECTION, SOLUTION INTRAVENOUS ONCE
Status: COMPLETED | OUTPATIENT
Start: 2017-09-29 | End: 2017-09-29

## 2017-09-29 RX ORDER — DIPHENHYDRAMINE HYDROCHLORIDE 50 MG/ML
12.5 INJECTION INTRAMUSCULAR; INTRAVENOUS
Status: DISCONTINUED | OUTPATIENT
Start: 2017-09-29 | End: 2017-09-29 | Stop reason: HOSPADM

## 2017-09-29 RX ORDER — LIDOCAINE HYDROCHLORIDE 20 MG/ML
INJECTION, SOLUTION INFILTRATION; PERINEURAL AS NEEDED
Status: DISCONTINUED | OUTPATIENT
Start: 2017-09-29 | End: 2017-09-29 | Stop reason: SURG

## 2017-09-29 RX ORDER — BUPIVACAINE HYDROCHLORIDE AND EPINEPHRINE 5; 5 MG/ML; UG/ML
INJECTION, SOLUTION PERINEURAL AS NEEDED
Status: DISCONTINUED | OUTPATIENT
Start: 2017-09-29 | End: 2017-09-29 | Stop reason: HOSPADM

## 2017-09-29 RX ORDER — ACETAMINOPHEN 325 MG/1
650 TABLET ORAL ONCE AS NEEDED
Status: DISCONTINUED | OUTPATIENT
Start: 2017-09-29 | End: 2017-09-29 | Stop reason: HOSPADM

## 2017-09-29 RX ORDER — OXYCODONE HYDROCHLORIDE AND ACETAMINOPHEN 5; 325 MG/1; MG/1
1 TABLET ORAL ONCE AS NEEDED
Status: DISCONTINUED | OUTPATIENT
Start: 2017-09-29 | End: 2017-09-29 | Stop reason: HOSPADM

## 2017-09-29 RX ORDER — OXYCODONE HYDROCHLORIDE AND ACETAMINOPHEN 5; 325 MG/1; MG/1
TABLET ORAL
Status: COMPLETED
Start: 2017-09-29 | End: 2017-09-29

## 2017-09-29 RX ORDER — NALBUPHINE HCL 10 MG/ML
2 AMPUL (ML) INJECTION EVERY 4 HOURS PRN
Status: DISCONTINUED | OUTPATIENT
Start: 2017-09-29 | End: 2017-09-29 | Stop reason: HOSPADM

## 2017-09-29 RX ORDER — MIDAZOLAM HYDROCHLORIDE 1 MG/ML
2 INJECTION INTRAMUSCULAR; INTRAVENOUS
Status: DISCONTINUED | OUTPATIENT
Start: 2017-09-29 | End: 2017-09-29 | Stop reason: HOSPADM

## 2017-09-29 RX ORDER — SODIUM CHLORIDE, SODIUM LACTATE, POTASSIUM CHLORIDE, CALCIUM CHLORIDE 600; 310; 30; 20 MG/100ML; MG/100ML; MG/100ML; MG/100ML
9 INJECTION, SOLUTION INTRAVENOUS CONTINUOUS
Status: DISCONTINUED | OUTPATIENT
Start: 2017-09-29 | End: 2017-09-29 | Stop reason: HOSPADM

## 2017-09-29 RX ORDER — PROMETHAZINE HYDROCHLORIDE 25 MG/1
25 SUPPOSITORY RECTAL ONCE AS NEEDED
Status: DISCONTINUED | OUTPATIENT
Start: 2017-09-29 | End: 2017-09-29 | Stop reason: HOSPADM

## 2017-09-29 RX ADMIN — PROPOFOL 50 MG: 10 INJECTION, EMULSION INTRAVENOUS at 13:10

## 2017-09-29 RX ADMIN — FAMOTIDINE 20 MG: 10 INJECTION, SOLUTION INTRAVENOUS at 11:51

## 2017-09-29 RX ADMIN — MIDAZOLAM 2 MG: 1 INJECTION INTRAMUSCULAR; INTRAVENOUS at 11:52

## 2017-09-29 RX ADMIN — SODIUM CHLORIDE, POTASSIUM CHLORIDE, SODIUM LACTATE AND CALCIUM CHLORIDE 9 ML/HR: 600; 310; 30; 20 INJECTION, SOLUTION INTRAVENOUS at 11:36

## 2017-09-29 RX ADMIN — PROPOFOL 150 MG: 10 INJECTION, EMULSION INTRAVENOUS at 13:08

## 2017-09-29 RX ADMIN — CEFAZOLIN SODIUM 2 G: 2 INJECTION, SOLUTION INTRAVENOUS at 13:12

## 2017-09-29 RX ADMIN — PROPOFOL 50 MG: 10 INJECTION, EMULSION INTRAVENOUS at 13:15

## 2017-09-29 RX ADMIN — FENTANYL CITRATE 25 MCG: 50 INJECTION INTRAMUSCULAR; INTRAVENOUS at 13:20

## 2017-09-29 RX ADMIN — FENTANYL CITRATE 25 MCG: 50 INJECTION INTRAMUSCULAR; INTRAVENOUS at 13:25

## 2017-09-29 RX ADMIN — OXYCODONE HYDROCHLORIDE AND ACETAMINOPHEN 1 TABLET: 5; 325 TABLET ORAL at 14:24

## 2017-09-29 RX ADMIN — FENTANYL CITRATE 50 MCG: 50 INJECTION INTRAMUSCULAR; INTRAVENOUS at 13:52

## 2017-09-29 RX ADMIN — DEXAMETHASONE SODIUM PHOSPHATE 8 MG: 10 INJECTION INTRAMUSCULAR; INTRAVENOUS at 13:15

## 2017-09-29 RX ADMIN — ACETAMINOPHEN 650 MG: 325 TABLET ORAL at 11:52

## 2017-09-29 RX ADMIN — ONDANSETRON 4 MG: 2 INJECTION INTRAMUSCULAR; INTRAVENOUS at 13:15

## 2017-09-29 RX ADMIN — LIDOCAINE HYDROCHLORIDE 100 MG: 20 INJECTION, SOLUTION INFILTRATION; PERINEURAL at 13:08

## 2017-09-29 NOTE — ANESTHESIA POSTPROCEDURE EVALUATION
Patient: Rosemarie Gandhi    Procedure Summary     Date Anesthesia Start Anesthesia Stop Room / Location    09/29/17 1302 1357  [facility]  /  [facility]       Procedure Diagnosis Surgeon Provider    RECTAL EXAM UNDER ANESTHESIA, SUPERFICIALANAL FISTULOTOMY (N/A Rectum) Perianal fistula  (Perianal fistula [K60.3]) MD Greg Martinez MD          Anesthesia Type: MAC  Last vitals  BP   119/66 (09/29/17 1415)    Temp   36.4 °C (97.6 °F) (09/29/17 1426)    Pulse   78 (09/29/17 1415)   Resp   16 (09/29/17 1426)    SpO2   97 % (09/29/17 1426)      Post Anesthesia Care and Evaluation    Patient location during evaluation: bedside  Patient participation: complete - patient participated  Level of consciousness: awake and alert  Pain management: adequate  Airway patency: patent  Anesthetic complications: No anesthetic complications    Cardiovascular status: acceptable  Respiratory status: acceptable  Hydration status: acceptable    Comments: /66  Pulse 78  Temp 36.4 °C (97.6 °F) (Temporal Artery )   Resp 16  Wt 235 lb (107 kg)  SpO2 97%  BMI 33.72 kg/m2

## 2017-09-29 NOTE — ANESTHESIA PREPROCEDURE EVALUATION
Anesthesia Evaluation     no history of anesthetic complications:  NPO Solid Status: > 8 hours       Airway   Mallampati: II  TM distance: >3 FB  Neck ROM: full  no difficulty expected  Dental - normal exam     Pulmonary - negative pulmonary ROS and normal exam   Cardiovascular     Rhythm: regular    (+) hyperlipidemia  (-) murmur      Neuro/Psych- negative ROS  GI/Hepatic/Renal/Endo    (+) obesity,      Musculoskeletal     Abdominal  - normal exam   Substance History      OB/GYN          Other                                      Anesthesia Plan    ASA 2     general   (D/W pt. MAC and possible awareness intra op.  Pt understands MAC and GA are not the same and the possibility of GA being required for failed MAC)  intravenous induction   Anesthetic plan and risks discussed with patient.

## 2017-10-17 ENCOUNTER — OFFICE VISIT (OUTPATIENT)
Dept: SURGERY | Facility: CLINIC | Age: 52
End: 2017-10-17

## 2017-10-17 DIAGNOSIS — Z09 POSTOP CHECK: Primary | ICD-10-CM

## 2017-10-17 PROCEDURE — 99024 POSTOP FOLLOW-UP VISIT: CPT | Performed by: SURGERY

## 2017-10-17 NOTE — PROGRESS NOTES
Cc: Follow up after perianal fistulotomy    S: Patient is here today for follow-up after undergoing perianal fistulotomy.  Patient reports no major complaints other than perianal pain.  Denies any drainage.  Has been having regular bowel movements and denies any fecal incontinence    O: Alert and oriented ×3.  She has an open wound in the left perianal area.  There is no evidence of drainage.  There is no purulent fluid.  There is granulation tissue at the base of the wound.  The wound measured approximately 3 x 2 cm.  There is minimal tenderness to palpation.  Her skin tags and external hemorrhoids in the right lateral and anterior aspect of the anal canal.    Assessment and plan    The patient is a very pleasant 52-year-old female with recurrent perianal fistula status post fistulotomy ×2.  Her left fistulotomy is healing fine and there is no issues with drainage or infection.  She has minimal pain.    - I discussed with her about the importance of quit smoking and allow this wounds to heal  - Continue sitz bath twice a day.  - Apply antibiotic ointment daily and a dry dressing as needed    Follow-up in my office in 2 weeks    Ej Sanford MD  General, Minimally Invasive and Endoscopic Surgery  [facility]    [address], [address]  [place], [place], [address]  P: [phone]  F: [phone]

## 2017-11-01 ENCOUNTER — OFFICE VISIT (OUTPATIENT)
Dept: SURGERY | Facility: CLINIC | Age: 52
End: 2017-11-01

## 2017-11-01 DIAGNOSIS — Z51.89 VISIT FOR WOUND CHECK: Primary | ICD-10-CM

## 2017-11-01 PROCEDURE — 99024 POSTOP FOLLOW-UP VISIT: CPT | Performed by: SURGERY

## 2017-11-04 NOTE — PROGRESS NOTES
Cc: Post op check    S: Pt here today for follow up after perianal fistulotomy. She is doing fine but developed perianal pain prior incision site. No fevers or chills, no drainage.     O: On exam the perianal wound is healing fine, there's a small micah anal abscess at right lateral perianal area. The area was slightly tender to touch. Needle aspiration confirmed small amount of purulent fluid. No erythema.    A/P: 51 yo female with recurrent perianal fistulas , hemorrhoids, smoker, cecal polyp. Now with an abscess at the prior surgical site due to possible persistent fistulous tracts. She does not have hx of Crohn's disease or IBD but continues to smoke. I unroofed the skin over the possible fistula and discuss with her about the need to stop smoking.     - I recommended sitz baths TID  - If fistula returns I will refer to Dr. Rubin for opinion  - Schedule colonoscopy June 2018  - Continue to recommend smoking cessation    Ej Sanford MD  General, Minimally Invasive and Endoscopic Surgery  [facility]    [address], [address]  [place], [place], [address]  P: [phone]  F: [phone]

## 2017-12-22 ENCOUNTER — TELEPHONE (OUTPATIENT)
Dept: SURGERY | Facility: CLINIC | Age: 52
End: 2017-12-22

## 2017-12-22 NOTE — TELEPHONE ENCOUNTER
Pt called to say she thinks she is getting another fistula. Do you need to see her first?  Of just refer to Dr Rubin.     Addend:  Spoke to patient and let her know we will refer to Dr Rubin.

## 2017-12-27 ENCOUNTER — TRANSCRIBE ORDERS (OUTPATIENT)
Dept: SURGERY | Facility: CLINIC | Age: 52
End: 2017-12-27

## 2017-12-27 DIAGNOSIS — L98.8 FISTULA: Primary | ICD-10-CM

## 2018-01-12 ENCOUNTER — OFFICE VISIT (OUTPATIENT)
Dept: SURGERY | Facility: CLINIC | Age: 53
End: 2018-01-12

## 2018-01-12 VITALS
HEART RATE: 102 BPM | OXYGEN SATURATION: 98 % | TEMPERATURE: 98.2 F | SYSTOLIC BLOOD PRESSURE: 118 MMHG | DIASTOLIC BLOOD PRESSURE: 74 MMHG | WEIGHT: 231 LBS | HEIGHT: 71 IN | BODY MASS INDEX: 32.34 KG/M2

## 2018-01-12 DIAGNOSIS — K60.3 PERIANAL FISTULA: Primary | ICD-10-CM

## 2018-01-12 DIAGNOSIS — K59.00 CONSTIPATION, UNSPECIFIED CONSTIPATION TYPE: ICD-10-CM

## 2018-01-12 PROCEDURE — 99204 OFFICE O/P NEW MOD 45 MIN: CPT | Performed by: COLON & RECTAL SURGERY

## 2018-01-12 NOTE — PROGRESS NOTES
Rosemarie Gandhi is a 52 y.o. female who is seen as a consult at the request of Ej Sanford* for Consult (ANAL FISTULA).      HPI:    Pt c/o recurrent perianal abscess and perianal fistulae   I&D perirectal abscess Dr. Sanford Sept 2016  Fistulotomy & external hemorrhoidectomy Dr. Sanford June 2017  Superficial fistulotomy Dr. Sanford September 2017    Colonoscopy Dr. Sanford June 2016: >50mm polyp IC valve, hyperplastic. several other polyps, both hyperplastic & TA  Colonoscopy Dr. Sanford November 2016 : serrated adenoma & hyperplastic polyps  Colonoscopy Dr. Sanford June 2017: hyperplastic polyps ×2  No evidence inflammation on any colonoscopy    Pt usually has 1-3 BMs per day  Since 2nd fistulotomy, she started taking senna qd-bid    Stools soft    She has had blood in the past; none currently  Most recent bleeding 3-4 weeks ago  Occasional anorectal pain depending on how she sits  No pain with BMs  No drainage  She has not used any creams on her bottom    No f/c    No change in weight    RIVERA for menorrhagia  No BSO    FamHx: no known hx IBD.  Possible colon polyps mother    Past Medical History:   Diagnosis Date   • History of acute sinusitis 11/2/2016 02/16/2017--patient again presents with a two-week history of left sided nasal congestion associated with discomfort in the nasal mucosa on that side of the nose, sinus pressure particularly in the morning, yellow/green posterior nasal drainage without fever or chills.  Examination reveals boggy nasal mucosa.  I do not see any intranasal lesions.  No definite tenderness to percussion over the sinuses.  I will treat patient for sinusitis and if she has another recurrence then we will refer her to ENT for possible endoscopic evaluation.  Once again, I do not appreciate any polyps.  Patient understands that my examination equipment and is somewhat limited.  We will treat her aggressively with the following: Levaquin 750 mg by mouth daily ×10 days.  She needs to apply  bacitracin to her nares as per previous.  Stahist AD one by mouth every 6 hours as needed for congestion, not greater than 3 in 24 hours.  11/02/2016--patient presents with a two-week history of left sided nasal congestion associated with irritate   • History of Ingrown left greater toenail 03/13/2015 03/13/2015--patient was evaluated by the podiatrist and assessment was ingrown great toenail and paronychia. Partial nail avulsion completed.   03/13/2015--patient presents with a greater than one-year history of intermittent pain, swelling, redness, tenderness involving her left great toe. Examination reveals early hallux valgus deformity as well as a possible ingrown nail. Patient referred to po   • History of mammogram 09/18/2015 09/18/2015--negative mammogram.   • History of Perirectal abscess 9/1/2016 09/12/2016--patient seen in follow-up for dressing change.  Patient is concerned about not being on an antibiotic and I explained to her that the definitive treatment is incision and drainage.  On exam the wound looks very good with no sign of purulent discharge.  She has a follow-up with general surgeon tomorrow.  09/02/2016--incision and drainage of left perirectal abscess by general surgery.  Cultures returned moderate growth of Klebsiella pneumoniae and heavy growth of Streptococcus agalactiae.   09/01/2016--patient presents with approximately 4 month history of a painful and swollen subcutaneous mass left buttock that is draining foul-smelling purulent material.  Examination confirms a large subcutaneous mass/abscess that I suspect is an infected sebaceous cyst.  General surgery referral given.   • History of Right cervical radiculopathy 9/11/2015 11/02/2016--right cervical radicular symptoms resolved after epidural injections.  08/22/2016--third cervical epidural injection.  06/20/2016--second cervical epidural injection.  06/09/2016--patient seen in follow-up and reports the epidural injections  have definitely helped.  Her gabapentin was increased to 3 times a day but patient cannot tolerate the midday dose.  05/19/2016--first cervical epidural injection.  04/19/2016--patient seen in follow-up and reports the neurosurgeon does not recommend surgery.  Physical therapy ordered which seems to be helping somewhat.  01/14/2016--patient seen in follow-up and her symptoms are actually worse. MRI reviewed. Prednisone 50 mg by mouth daily times 7 days, taper and discontinue. Neurosurgical referral given.   12/23/2015--MRI cervical spine reveals at C4-C5 there is mild spinal stenosis. There is moderate to severe bilateral foraminal narrowing. At C5-C6 there is mild left but no right foraminal narrowing. At C6-C7 there are surgical changes with ha   • History of Right rotator cuff tendinitis 12/11/2015 12/11/2015--x-ray of the cervical spine reveals anterior discectomy and fusion at C6-C7. There is moderate disc space narrowing at C4-C5 and the remainder of the cervical disc spaces are within normal limits. There is moderately severe narrowing of the right neural foramen at C3-C5 and also moderate narrowing of the left neural foramen at this level. Mild bilateral foraminal narrowing is also pres   • History of tetanus, diphtheria, and acellular pertussis booster vaccination (Tdap) 2/16/2017 02/16/2017--TDaP given   • Poison ivy        Past Surgical History:   Procedure Laterality Date   • CERVICAL FUSION  07/23/2012 07/23/2012--anterior cervical spinal fusion, C6--C7 with instrumentation.   • COLONOSCOPY N/A 6/21/2016 06/21/2016--colonoscopy revealed a greater than 50 mm polyp at the ileocecal valve.  Pathology returned hyperplastic polyp.  A multilobulated 5 mm polyp was found in the transverse colon.  Pathology returned tubular adenoma with low grade dysplasia.  3 multilobulated polyps were found in the sigmoid colon.  Pathology revealed fragments of hyperplastic polyps.  2 multilobulated polyps were  found in   • COLONOSCOPY N/A 11/22/2016 11/22/2016--colonoscopy revealed a polyp at the ileocecal valve and sigmoid colon.  There were 4 polyps at the rectosigmoid colon.  The single diverticulum in the sigmoid colon was not commented upon.  Pathology returned a sessile serrated adenoma at the ileocecal valve and the sigmoid colon.  The rectosigmoid polyp was hyperplastic.   • ENDOMETRIAL ABLATION  2006 2006--endometrial ablation ×2 for menorrhagia/menorrhea.   • INCISION AND DRAINAGE PERIRECTAL ABSCESS N/A 9/2/2016 09/02/2016--incision and drainage of perirectal abscess.   • PARTIAL HYSTERECTOMY  10/2008    October 2008--partial hysterectomy for menorrhagia/menorrhea. Previously had endometrial ablation procedures 2. Pt states uterus was removed.   • RECTAL EXAMINATION UNDER ANESTHESIA N/A 6/1/2017    Procedure: COLONOSCOPY, RECTAL EXAM UNDER ANESTHESIA, FISTULOTOMY, EXTERNAL HEMORRHOIDECTOMY ;  Surgeon: Ej Sanford MD;  Location: [facility] OR;  Service:    • RECTAL FISTULOTOMY N/A 9/29/2017    Procedure: RECTAL EXAM UNDER ANESTHESIA, SUPERFICIALANAL FISTULOTOMY;  Surgeon: Ej Sanford MD;  Location: [facility] [place];  Service:    • WRIST ARTHROPLASTY Right 07/15/2008    07/15/2008--right radial ulnar distal joint arthroplasty performed for non-traumatic arthritis/impingement syndrome.       Social History:   reports that she has been smoking Cigarettes.  She has a 10.00 pack-year smoking history. She has never used smokeless tobacco. She reports that she drinks alcohol. She reports that she does not use illicit drugs.      Marriage status:     Family History   Problem Relation Age of Onset   • Diabetes Mother      Type 2   • Diabetes Maternal Grandmother      Type 2   • Malig Hyperthermia Neg Hx          Current Outpatient Prescriptions:   •  Cholecalciferol (VITAMIN D3) 5000 UNITS tablet, Take 1 tablet by mouth Daily., Disp: , Rfl:   •  senna-docusate (PERICOLACE) 8.6-50  MG per tablet, Take 2 tablets by mouth Daily As Needed for Constipation., Disp: 30 tablet, Rfl: 1  •  B Complex Vitamins (VITAMIN B COMPLEX PO), Take 1 capsule by mouth Daily., Disp: , Rfl:   •  cetirizine (zyrTEC) 10 MG tablet, Take 10 mg by mouth Daily., Disp: , Rfl:   •  gabapentin (NEURONTIN) 600 MG tablet, Take 1 tablet by mouth 2 (Two) Times a Day., Disp: 180 tablet, Rfl: 3  •  ibuprofen (ADVIL,MOTRIN) 200 MG tablet, Take 200 mg by mouth every 6 (six) hours as needed for mild pain (1-3)., Disp: , Rfl:   •  lidocaine (XYLOCAINE) 2 % jelly, Apply to affected area daily prn, Disp: 30 mL, Rfl: 1    Allergy  Review of patient's allergies indicates no known allergies.    Review of Systems   Constitution: Negative for decreased appetite, weakness and weight gain.   HENT: Negative for congestion, hearing loss and hoarse voice.    Eyes: Negative for blurred vision, discharge and visual disturbance.   Cardiovascular: Negative for chest pain, cyanosis and leg swelling.   Respiratory: Negative for cough, shortness of breath, sleep disturbances due to breathing and snoring.    Endocrine: Negative for cold intolerance and heat intolerance.   Hematologic/Lymphatic: Does not bruise/bleed easily.   Skin: Negative for itching, poor wound healing and skin cancer.   Musculoskeletal: Negative for arthritis, back pain, joint pain and joint swelling.   Gastrointestinal: Negative for abdominal pain, change in bowel habit, bowel incontinence and constipation.   Genitourinary: Negative for bladder incontinence, dysuria and hematuria.   Neurological: Negative for brief paralysis, excessive daytime sleepiness, dizziness, focal weakness, headaches and light-headedness.   Psychiatric/Behavioral: Negative for altered mental status and hallucinations. The patient does not have insomnia.    Allergic/Immunologic: Negative for HIV exposure and persistent infections.   All other systems reviewed and are negative.      Vitals:    01/12/18 1115    BP: 118/74   Pulse: 102   Temp: 98.2 °F (36.8 °C)   SpO2: 98%     Body mass index is 32.68 kg/(m^2).    Physical Exam   Constitutional: She is oriented to person, place, and time. She appears well-developed and well-nourished. No distress.   HENT:   Head: Normocephalic and atraumatic.   Nose: Nose normal.   Mouth/Throat: Oropharynx is clear and moist.   Eyes: Conjunctivae and EOM are normal. Pupils are equal, round, and reactive to light.   Neck: Normal range of motion. No tracheal deviation present.   Pulmonary/Chest: Effort normal and breath sounds normal. No respiratory distress.   Abdominal: Soft. Bowel sounds are normal. She exhibits no distension.   Genitourinary:   Genitourinary Comments: Perianal exam: external: left anterior well-healed scar tissue.  Anterior anal canal with 2 point openings ~1cm apart proximal to distal; not connecting with fistula probe   Musculoskeletal: Normal range of motion. She exhibits no edema or deformity.   Neurological: She is alert and oriented to person, place, and time. No cranial nerve deficit. Coordination and gait normal.   Skin: Skin is warm and dry.   Psychiatric: She has a normal mood and affect. Her behavior is normal. Judgment normal.       Review of Medical Record:  I reviewed [id number] records    Discussed case with Dr. Sanford    Assessment:  1. Perianal fistula    2. Constipation, unspecified constipation type        Plan:      Discussion with patient that if openings are not bothering her with drainage or pain, no surgical intervention required at this time.  Surgery would be a superficial fistulotomy.  This would be another wound to heal.  She is not interested at this time.  I recommend fiber therapy and detailed and gave written instructions on how to achieve a high fiber diet for her constipation.      RTC prn      Scribed for Roman Rubin MD by Holly Magana PA-C 1/12/2018  This patient was evaluated by me, recommendations made, documentation reviewed,  edited, and revised by me, Roman Rubin MD

## 2018-02-22 ENCOUNTER — OFFICE VISIT (OUTPATIENT)
Dept: OBSTETRICS AND GYNECOLOGY | Facility: CLINIC | Age: 53
End: 2018-02-22

## 2018-02-22 VITALS
DIASTOLIC BLOOD PRESSURE: 78 MMHG | BODY MASS INDEX: 33.64 KG/M2 | HEIGHT: 70 IN | WEIGHT: 235 LBS | SYSTOLIC BLOOD PRESSURE: 120 MMHG

## 2018-02-22 DIAGNOSIS — Z13.9 SCREENING FOR CONDITION: Primary | ICD-10-CM

## 2018-02-22 DIAGNOSIS — Z11.51 SPECIAL SCREENING EXAMINATION FOR HUMAN PAPILLOMAVIRUS (HPV): ICD-10-CM

## 2018-02-22 DIAGNOSIS — Z01.419 PAP SMEAR, LOW-RISK: ICD-10-CM

## 2018-02-22 DIAGNOSIS — L73.2 HIDRADENITIS SUPPURATIVA: ICD-10-CM

## 2018-02-22 DIAGNOSIS — Z01.411 ENCOUNTER FOR GYNECOLOGICAL EXAMINATION WITH ABNORMAL FINDING: ICD-10-CM

## 2018-02-22 PROCEDURE — 99386 PREV VISIT NEW AGE 40-64: CPT | Performed by: OBSTETRICS & GYNECOLOGY

## 2018-02-22 PROCEDURE — 99406 BEHAV CHNG SMOKING 3-10 MIN: CPT | Performed by: OBSTETRICS & GYNECOLOGY

## 2018-02-22 NOTE — PROGRESS NOTES
GYN Annual Exam     CC- Here for annual exam.     Rosemarie Gandhi is a 52 y.o. female new patient who presents for annual well woman exam. She has had a RIVERA with OC in  for DUB. She also has a history of a perirectal abscess that has recurred 3 times. She is not on any HRT and has had no inflammatory bowel changes that she knows about.       OB History      Para Term  AB Living    2 2 2   2    SAB TAB Ectopic Multiple Live Births                Obstetric Comments    2           Menarche:14  Menopause: RIVERA with [facility]  for DUB  HRT:none  Current contraception: status post hysterectomy  History of abnormal Pap smear: no  History of abnormal mammogram: no  Family history of uterine, colon or ovarian cancer: no  Family history of breast cancer: no  STD's: none    Health Maintenance   Topic Date Due   • PAP SMEAR  2016   • INFLUENZA VACCINE  2017   • MAMMOGRAM  2017   • COLONOSCOPY  2017   • LIPID PANEL  2018   • TDAP/TD VACCINES (2 - Td) 2027   • PNEUMOCOCCAL VACCINE (19-64 MEDIUM RISK)  Addressed       Past Medical History:   Diagnosis Date   • History of acute sinusitis 2017--patient again presents with a two-week history of left sided nasal congestion associated with discomfort in the nasal mucosa on that side of the nose, sinus pressure particularly in the morning, yellow/green posterior nasal drainage without fever or chills.  Examination reveals boggy nasal mucosa.  I do not see any intranasal lesions.  No definite tenderness to percussion over the sinuses.  I will treat patient for sinusitis and if she has another recurrence then we will refer her to ENT for possible endoscopic evaluation.  Once again, I do not appreciate any polyps.  Patient understands that my examination equipment and is somewhat limited.  We will treat her aggressively with the following: Levaquin 750 mg by mouth daily ×10 days.  She needs to apply bacitracin to her  nares as per previous.  Stahist AD one by mouth every 6 hours as needed for congestion, not greater than 3 in 24 hours.  11/02/2016--patient presents with a two-week history of left sided nasal congestion associated with irritate   • History of Ingrown left greater toenail 03/13/2015 03/13/2015--patient was evaluated by the podiatrist and assessment was ingrown great toenail and paronychia. Partial nail avulsion completed.   03/13/2015--patient presents with a greater than one-year history of intermittent pain, swelling, redness, tenderness involving her left great toe. Examination reveals early hallux valgus deformity as well as a possible ingrown nail. Patient referred to po   • History of mammogram 09/18/2015 09/18/2015--negative mammogram.   • History of Perirectal abscess 9/1/2016 09/12/2016--patient seen in follow-up for dressing change.  Patient is concerned about not being on an antibiotic and I explained to her that the definitive treatment is incision and drainage.  On exam the wound looks very good with no sign of purulent discharge.  She has a follow-up with general surgeon tomorrow.  09/02/2016--incision and drainage of left perirectal abscess by general surgery.  Cultures returned moderate growth of Klebsiella pneumoniae and heavy growth of Streptococcus agalactiae.   09/01/2016--patient presents with approximately 4 month history of a painful and swollen subcutaneous mass left buttock that is draining foul-smelling purulent material.  Examination confirms a large subcutaneous mass/abscess that I suspect is an infected sebaceous cyst.  General surgery referral given.   • History of Right cervical radiculopathy 9/11/2015 11/02/2016--right cervical radicular symptoms resolved after epidural injections.  08/22/2016--third cervical epidural injection.  06/20/2016--second cervical epidural injection.  06/09/2016--patient seen in follow-up and reports the epidural injections have definitely  helped.  Her gabapentin was increased to 3 times a day but patient cannot tolerate the midday dose.  05/19/2016--first cervical epidural injection.  04/19/2016--patient seen in follow-up and reports the neurosurgeon does not recommend surgery.  Physical therapy ordered which seems to be helping somewhat.  01/14/2016--patient seen in follow-up and her symptoms are actually worse. MRI reviewed. Prednisone 50 mg by mouth daily times 7 days, taper and discontinue. Neurosurgical referral given.   12/23/2015--MRI cervical spine reveals at C4-C5 there is mild spinal stenosis. There is moderate to severe bilateral foraminal narrowing. At C5-C6 there is mild left but no right foraminal narrowing. At C6-C7 there are surgical changes with ha   • History of Right rotator cuff tendinitis 12/11/2015 12/11/2015--x-ray of the cervical spine reveals anterior discectomy and fusion at C6-C7. There is moderate disc space narrowing at C4-C5 and the remainder of the cervical disc spaces are within normal limits. There is moderately severe narrowing of the right neural foramen at C3-C5 and also moderate narrowing of the left neural foramen at this level. Mild bilateral foraminal narrowing is also pres   • History of tetanus, diphtheria, and acellular pertussis booster vaccination (Tdap) 2/16/2017 02/16/2017--TDaP given   • Poison ivy        Past Surgical History:   Procedure Laterality Date   • CERVICAL FUSION  07/23/2012 07/23/2012--anterior cervical spinal fusion, C6--C7 with instrumentation.   • COLONOSCOPY N/A 6/21/2016 06/21/2016--colonoscopy revealed a greater than 50 mm polyp at the ileocecal valve.  Pathology returned hyperplastic polyp.  A multilobulated 5 mm polyp was found in the transverse colon.  Pathology returned tubular adenoma with low grade dysplasia.  3 multilobulated polyps were found in the sigmoid colon.  Pathology revealed fragments of hyperplastic polyps.  2 multilobulated polyps were found in   •  COLONOSCOPY N/A 11/22/2016 11/22/2016--colonoscopy revealed a polyp at the ileocecal valve and sigmoid colon.  There were 4 polyps at the rectosigmoid colon.  The single diverticulum in the sigmoid colon was not commented upon.  Pathology returned a sessile serrated adenoma at the ileocecal valve and the sigmoid colon.  The rectosigmoid polyp was hyperplastic.   • ENDOMETRIAL ABLATION  2006 2006--endometrial ablation ×2 for menorrhagia/menorrhea.   • INCISION AND DRAINAGE PERIRECTAL ABSCESS N/A 9/2/2016 09/02/2016--incision and drainage of perirectal abscess.   • PARTIAL HYSTERECTOMY  10/2008    October 2008--partial hysterectomy for menorrhagia/menorrhea. Previously had endometrial ablation procedures 2. Pt states uterus was removed.   • RECTAL EXAMINATION UNDER ANESTHESIA N/A 6/1/2017    Procedure: COLONOSCOPY, RECTAL EXAM UNDER ANESTHESIA, FISTULOTOMY, EXTERNAL HEMORRHOIDECTOMY ;  Surgeon: Ej Sanford MD;  Location: [facility] OR;  Service:    • RECTAL FISTULOTOMY N/A 9/29/2017    Procedure: RECTAL EXAM UNDER ANESTHESIA, SUPERFICIALANAL FISTULOTOMY;  Surgeon: Ej Sanford MD;  Location: [facility];  Service:    • WRIST ARTHROPLASTY Right 07/15/2008    07/15/2008--right radial ulnar distal joint arthroplasty performed for non-traumatic arthritis/impingement syndrome.         Current Outpatient Prescriptions:   •  B Complex Vitamins (VITAMIN B COMPLEX PO), Take 1 capsule by mouth Daily., Disp: , Rfl:   •  cetirizine (zyrTEC) 10 MG tablet, Take 10 mg by mouth Daily., Disp: , Rfl:   •  Cholecalciferol (VITAMIN D3) 5000 UNITS tablet, Take 1 tablet by mouth Daily., Disp: , Rfl:   •  gabapentin (NEURONTIN) 600 MG tablet, Take 1 tablet by mouth 2 (Two) Times a Day., Disp: 180 tablet, Rfl: 3  •  ibuprofen (ADVIL,MOTRIN) 200 MG tablet, Take 200 mg by mouth every 6 (six) hours as needed for mild pain (1-3)., Disp: , Rfl:   •  senna-docusate (PERICOLACE) 8.6-50 MG per tablet, Take 2  "tablets by mouth Daily As Needed for Constipation., Disp: 30 tablet, Rfl: 1    No Known Allergies    Social History   Substance Use Topics   • Smoking status: Current Every Day Smoker     Packs/day: 0.50     Years: 20.00     Types: Cigarettes   • Smokeless tobacco: Never Used   • Alcohol use Yes      Comment: Occasional       Family History   Problem Relation Age of Onset   • Diabetes Mother      Type 2   • Diabetes Maternal Grandmother      Type 2   • Malig Hyperthermia Neg Hx    • Breast cancer Neg Hx    • Ovarian cancer Neg Hx    • Colon cancer Neg Hx    • Deep vein thrombosis Neg Hx    • Pulmonary embolism Neg Hx        Review of Systems   Constitutional: Negative for appetite change, fatigue, fever and unexpected weight change.   Eyes: Negative for photophobia and visual disturbance.   Respiratory: Negative for cough and shortness of breath.    Cardiovascular: Negative for chest pain and palpitations.   Gastrointestinal: Negative for abdominal distention, abdominal pain, constipation, diarrhea and nausea.   Endocrine: Negative for cold intolerance and heat intolerance.   Genitourinary: Positive for genital sores (groin \"boils\"). Negative for dyspareunia, dysuria, menstrual problem, pelvic pain and vaginal discharge.   Musculoskeletal: Positive for back pain, neck pain and neck stiffness.   Skin: Positive for wound (groin boils). Negative for color change and rash.   Allergic/Immunologic: Negative for environmental allergies and food allergies.   Neurological: Negative for headaches.   Hematological: Negative for adenopathy. Does not bruise/bleed easily.   Psychiatric/Behavioral: Negative for dysphoric mood. The patient is not nervous/anxious.        /78  Ht 177.8 cm (70\")  Wt 107 kg (235 lb)  BMI 33.72 kg/m2    Physical Exam   Constitutional: She is oriented to person, place, and time. She appears well-developed and well-nourished.   HENT:   Head: Normocephalic and atraumatic.   Neck: No thyromegaly " present.   Cardiovascular: Normal rate and regular rhythm.    Pulmonary/Chest: Effort normal and breath sounds normal. Right breast exhibits no inverted nipple, no mass, no nipple discharge, no skin change and no tenderness. Left breast exhibits no inverted nipple, no mass, no nipple discharge, no skin change and no tenderness.   Abdominal: Soft. Bowel sounds are normal. She exhibits no distension and no mass. There is no tenderness. There is no rebound and no guarding. No hernia.   Genitourinary:       Pelvic exam was performed with patient supine. There is lesion on the right labia. There is no rash or tenderness on the right labia. There is lesion on the left labia. There is no rash or tenderness on the left labia. Right adnexum displays no mass, no tenderness and no fullness. Left adnexum displays no mass, no tenderness and no fullness. No erythema, tenderness or bleeding in the vagina. No foreign body in the vagina. No signs of injury around the vagina. No vaginal discharge found.   Genitourinary Comments: Cuff smooth  Hidradenitis supp noted in groin   Musculoskeletal: Normal range of motion.   Neurological: She is alert and oriented to person, place, and time.   Skin: Skin is warm and dry.   Psychiatric: She has a normal mood and affect. Her behavior is normal. Judgment and thought content normal.   Nursing note and vitals reviewed.         Assessment/Plan    1) GYN HM: check pap/HPV   SBE demonstrated and encouraged.  2) STD screening: declines Condoms encouraged.  3) Bone health - Weight bearing exercise, dietary calcium recommendations and vitamin D reviewed.   4) Diet and Exercise discussed  5) Smoking Status: I advised the patient of the risks in continuing to use tobacco, and I provided this patient with smoking cessation educational materials.  During this visit, I spent > 3-10 minutes counseling the patient regarding smoking cessation.  6) HIdradenitis suppuritiva- refer derm. Given her h/o fistulas,  rec pt get this treated.  7)MMG: schedule  8) DEXA-plan age 56  9)C scope- UTD 2017, repeat 6/2018   Follow up prn and 1 year       Rosemarie was seen today for gynecologic exam.    Diagnoses and all orders for this visit:    Screening for condition  -     Cancel: POC Urinalysis Dipstick  -     Mammo Screening Bilateral With CAD; Future    Pap smear, low-risk  -     Pap IG, HPV-hr    Special screening examination for human papillomavirus (HPV)  -     Pap IG, HPV-hr    Hidradenitis suppurativa  -     Ambulatory Referral to Dermatology    Encounter for gynecological examination with abnormal finding        Diana Jefferson MD  2/25/2018  4:34 PM

## 2018-02-26 LAB
CYTOLOGIST CVX/VAG CYTO: NORMAL
CYTOLOGY CVX/VAG DOC THIN PREP: NORMAL
DX ICD CODE: NORMAL
HIV 1 & 2 AB SER-IMP: NORMAL
HPV I/H RISK 1 DNA CVX QL PROBE+SIG AMP: NEGATIVE
OTHER STN SPEC: NORMAL
PATH REPORT.FINAL DX SPEC: NORMAL
STAT OF ADQ CVX/VAG CYTO-IMP: NORMAL

## 2018-03-16 ENCOUNTER — HOSPITAL ENCOUNTER (OUTPATIENT)
Dept: MAMMOGRAPHY | Facility: HOSPITAL | Age: 53
Discharge: HOME | End: 2018-03-16
Attending: OBSTETRICS & GYNECOLOGY | Admitting: OBSTETRICS & GYNECOLOGY

## 2018-03-16 DIAGNOSIS — Z13.9 SCREENING FOR CONDITION: ICD-10-CM

## 2018-03-16 PROCEDURE — 77067 SCR MAMMO BI INCL CAD: CPT

## 2018-04-19 RX ORDER — GABAPENTIN 600 MG/1
600 TABLET ORAL 2 TIMES DAILY
Qty: 60 TABLET | Refills: 0 | OUTPATIENT
Start: 2018-04-19 | End: 2018-05-16 | Stop reason: SDUPTHER

## 2018-05-16 RX ORDER — GABAPENTIN 600 MG/1
TABLET ORAL
Qty: 60 TABLET | Refills: 0 | OUTPATIENT
Start: 2018-05-16 | End: 2018-06-13 | Stop reason: SDUPTHER

## 2018-05-16 NOTE — TELEPHONE ENCOUNTER
Pt is having pain in lower right side. She would like to be worked in today. OK ? Or UC? Call [phone].

## 2018-05-30 ENCOUNTER — HOSPITAL ENCOUNTER (OUTPATIENT)
Dept: GENERAL RADIOLOGY | Facility: HOSPITAL | Age: 53
Discharge: HOME | End: 2018-05-30

## 2018-05-30 ENCOUNTER — HOSPITAL ENCOUNTER (OUTPATIENT)
Dept: GENERAL RADIOLOGY | Facility: HOSPITAL | Age: 53
Discharge: HOME | End: 2018-05-30
Admitting: INTERNAL MEDICINE

## 2018-05-30 ENCOUNTER — OFFICE VISIT (OUTPATIENT)
Dept: INTERNAL MEDICINE | Facility: CLINIC | Age: 53
End: 2018-05-30

## 2018-05-30 VITALS
BODY MASS INDEX: 34.79 KG/M2 | WEIGHT: 243 LBS | DIASTOLIC BLOOD PRESSURE: 80 MMHG | SYSTOLIC BLOOD PRESSURE: 110 MMHG | HEIGHT: 70 IN | HEART RATE: 87 BPM | OXYGEN SATURATION: 95 %

## 2018-05-30 DIAGNOSIS — G89.29 CHRONIC RIGHT HIP PAIN: ICD-10-CM

## 2018-05-30 DIAGNOSIS — E78.5 HYPERLIPIDEMIA, UNSPECIFIED HYPERLIPIDEMIA TYPE: Chronic | ICD-10-CM

## 2018-05-30 DIAGNOSIS — E55.9 VITAMIN D DEFICIENCY: Chronic | ICD-10-CM

## 2018-05-30 DIAGNOSIS — M79.604 LOW BACK PAIN RADIATING TO RIGHT LOWER EXTREMITY: ICD-10-CM

## 2018-05-30 DIAGNOSIS — M25.551 CHRONIC RIGHT HIP PAIN: ICD-10-CM

## 2018-05-30 DIAGNOSIS — M25.551 CHRONIC RIGHT HIP PAIN: Primary | ICD-10-CM

## 2018-05-30 DIAGNOSIS — G89.29 CHRONIC RIGHT HIP PAIN: Primary | ICD-10-CM

## 2018-05-30 DIAGNOSIS — Z51.81 THERAPEUTIC DRUG MONITORING: ICD-10-CM

## 2018-05-30 DIAGNOSIS — M54.50 LOW BACK PAIN RADIATING TO RIGHT LOWER EXTREMITY: ICD-10-CM

## 2018-05-30 DIAGNOSIS — Z00.00 ROUTINE PHYSICAL EXAMINATION: ICD-10-CM

## 2018-05-30 PROBLEM — M25.562 CHRONIC PAIN OF LEFT KNEE: Chronic | Status: RESOLVED | Noted: 2017-02-16 | Resolved: 2018-05-30

## 2018-05-30 PROBLEM — K60.3 PERIANAL FISTULA: Status: RESOLVED | Noted: 2017-09-19 | Resolved: 2018-05-30

## 2018-05-30 PROCEDURE — 72110 X-RAY EXAM L-2 SPINE 4/>VWS: CPT

## 2018-05-30 PROCEDURE — 73502 X-RAY EXAM HIP UNI 2-3 VIEWS: CPT

## 2018-05-30 PROCEDURE — 99214 OFFICE O/P EST MOD 30 MIN: CPT | Performed by: INTERNAL MEDICINE

## 2018-05-30 NOTE — PROGRESS NOTES
05/30/2018    Patient Information  Rosemarie CHINCHILLA [address] [address]      1965  [phone]      Chief Complaint:     Complaining of right hip pain.    History of Present Illness:    Patient with a history of environmental allergies/allergic rhinitis, history of perirectal abscess and rectal fistula repair, history of colon polyps, hyperlipidemia.  She presents today with complaints of right hip pain and right lower back pain as described below.  I also reviewed her recent colonoscopy report and anal fistula repair.  Past medical history reviewed and updated where necessary including health maintenance parameters.  This reveals she is overdue for her annual with lab.  She also needs a colonoscopy and has the paperwork at home.    The history regarding chronic right hip pain and right lower back pain:    05/30/2018--patient presents with approximately two-week history of right hip pain which seems to be located laterally and also radiates in towards the inguinal ligament region.  No known trauma.  The pain is worse with increasing weightbearing and also with sleeping on her right side.  No problems with sitting but the pain is definitely aggravated by standing up.  The pain seems to ease after she takes several steps  but does not totally resolve when she is standing.  There is a background of a dull ache.  No numbness or paresthesias.  Occasionally has right-sided lower back pain.  Examination reveals good rotation and range of motion of the right hip and there is no definite tenderness to percussion or palpation over the trochanteric bursal area.  Possibilities include primary hip pathology as well as lumbar radiculopathy.  Plan is as follows: X-ray of the left hip and pelvis.  X-ray of the lumbar spine.  Patient will follow-up on the phone for the results either later today or tomorrow and  possible further instructions.      Review of Systems   Constitution: Negative.   HENT: Negative.    Eyes: Negative.    Cardiovascular: Negative.    Respiratory: Negative.    Endocrine: Negative.    Hematologic/Lymphatic: Negative.    Skin: Negative.    Musculoskeletal: Positive for back pain.        Right hip pain.   Gastrointestinal: Negative.    Genitourinary: Negative.    Neurological: Negative.    Psychiatric/Behavioral: Negative.    Allergic/Immunologic: Negative.        Active Problems:    Patient Active Problem List   Diagnosis   • Degeneration of intervertebral disc of mid-cervical region   • Hallux valgus of left foot   • Hyperlipidemia   • Non morbid obesity   • Vitamin D deficiency   • Therapeutic drug monitoring   • Routine physical examination   • History of colon polyps, 11/22/2016-- adenoma ×2. 06/21/2016--tubular adenoma ×2.  Multiple hyperplastic.  Greater than 50 mm hyperplastic polyp at the ileocecal valve.  Rep   • Diverticulosis of colon   • Multiple environmental allergies   • Allergic rhinitis   • Abnormal weight gain   • Chronic right hip pain   • Low back pain radiating to right lower extremity         Past Medical History:   Diagnosis Date   • Abnormal weight gain 2/16/2017 05/01/2017--patient seen in follow-up and reports she stopped the phentermine and topiramate due to side effects, specifically constipation.  Current weight is 232 pounds which is the same as her last visit. She discontinued these medications and her symptoms resolved.  She restarted the medications and the symptoms returned.   03/14/2017--patient seen in follow-up and has lost 5 pounds.  She seems to be tolerating the medication well but does have a dry mouth.  I see no reason why we cannot continue the current regimen.  I will have her follow-up in about 5-6 weeks to reassess.  Current weight is 232 pounds.  02/16/2017--patient presents with a several year history of weight gain despite multiple attempts  at various diet and exercise regimens.  She has failed low carbohydrate diet.  He has not been to Weight [employer]chers which in my opinion is a farce.  She is asking about Saxenda which I think could be very helpful but I explained to her that it's extremely expensive and her insurance is likely not pay for   • Allergic rhinitis 11/2/2016    Patient has never had allergy testing.   • Degeneration of intervertebral disc of mid-cervical region 12/23/2015 12/23/2015--MRI cervical spine reveals at C4-C5 there is mild spinal stenosis. There is moderate to severe bilateral foraminal narrowing. At C5-C6 there is mild left but no right foraminal narrowing. At C6-C7 there are surgical changes with hardware and this results in artifact. Evaluation for solid bony fusion cannot be done because of this. There is only minimal if any canal narrowing at C6-C7. There is mild right and mild to moderate left bony foraminal narrowing. C7-T1 reveals minimal right foraminal narrowing.    • Diverticulosis of colon 6/21/2016 11/22/2016--colonoscopy revealed a polyp at the ileocecal valve and sigmoid colon.  There were 4 polyps at the rectosigmoid colon.  The single diverticulum in the sigmoid colon was not commented upon.  Pathology returned a sessile serrated adenoma at the ileocecal valve and the sigmoid colon.  The rectosigmoid polyp was hyperplastic.  06/21/2016--colonoscopy revealed a greater than 50 mm polyp at the ileocecal valve.  Pathology returned hyperplastic polyp.  A multilobulated 5 mm polyp was found in the transverse colon.  Pathology returned tubular adenoma with low grade dysplasia.  3 multilobulated polyps were found in the sigmoid colon.  Pathology revealed fragments of hyperplastic polyps.  2 multilobulated polyps were found in the rectosigmoid colon and pathology returned tubular adenoma with low-grade dysplasia.  A single small mouth diverticula and was found in the sigmoid.  Nonbleeding internal hemorrhoids.   General surgery recommended repeat colonoscopy in 3 months.   • Hallux valgus of left foot 4/4/2016 03/13/2015--patient was evaluated by the podiatrist and assessment was ingrown great toenail and paronychia. Partial nail avulsion completed.   03/13/2015--patient presents with a greater than one-year history of intermittent pain, swelling, redness, tenderness involving her left great toe. Examination reveals early hallux valgus deformity as well as a possible ingrown nail. Patient referred to podiatry. Uric acid and CRP obtained to rule out possibility of gout and both were normal.   • History of colon polyps, 11/22/2016-- adenoma ×2. 06/21/2016--tubular adenoma ×2.  Multiple hyperplastic.  Greater than 50 mm hyperplastic polyp at the ileocecal valve.  Rep 6/21/2016 06/01/2017--colonoscopy revealed hyperplastic ×2.  Perianal fistulotomy.  External hemorrhoidectomy of one column.  11/22/2016--colonoscopy revealed a polyp at the ileocecal valve and sigmoid colon.  There were 4 polyps at the rectosigmoid colon.  The single diverticulum in the sigmoid colon was not commented upon.  Pathology returned a sessile serrated adenoma at the ileocecal valve and the sigmoid colon.  The rectosigmoid polyp was hyperplastic.  06/21/2016--colonoscopy revealed a greater than 50 mm polyp at the ileocecal valve.  Pathology returned hyperplastic polyp.  A multilobulated 5 mm polyp was found in the transverse colon.  Pathology returned tubular adenoma with low grade dysplasia.  3 multilobulated polyps were found in the sigmoid colon.  Pathology revealed fragments of hyperplastic polyps.  2 multilobulated polyps were found in the rectosigmoid colon and pathology returned tubular adenoma with low-grade dysplasia.  A single small mouth diverticula and was found in the sigmoid.  Nonbleeding i   • History of Perirectal abscess 9/1/2016 09/12/2016--patient seen in follow-up for dressing change.  Patient is concerned about not  being on an antibiotic and I explained to her that the definitive treatment is incision and drainage.  On exam the wound looks very good with no sign of purulent discharge.  She has a follow-up with general surgeon tomorrow.  09/02/2016--incision and drainage of left perirectal abscess by general surgery.  Cultures returned moderate growth of Klebsiella pneumoniae and heavy growth of Streptococcus agalactiae.   09/01/2016--patient presents with approximately 4 month history of a painful and swollen subcutaneous mass left buttock that is draining foul-smelling purulent material.  Examination confirms a large subcutaneous mass/abscess that I suspect is an infected sebaceous cyst.  General surgery referral given.   • History of Right cervical radiculopathy 9/11/2015 11/02/2016--right cervical radicular symptoms resolved after epidural injections.  08/22/2016--third cervical epidural injection.  06/20/2016--second cervical epidural injection.  06/09/2016--patient seen in follow-up and reports the epidural injections have definitely helped.  Her gabapentin was increased to 3 times a day but patient cannot tolerate the midday dose.  05/19/2016--first cervical epidural injection.  04/19/2016--patient seen in follow-up and reports the neurosurgeon does not recommend surgery.  Physical therapy ordered which seems to be helping somewhat.  01/14/2016--patient seen in follow-up and her symptoms are actually worse. MRI reviewed. Prednisone 50 mg by mouth daily times 7 days, taper and discontinue. Neurosurgical referral given.   12/23/2015--MRI cervical spine reveals at C4-C5 there is mild spinal stenosis. There is moderate to severe bilateral foraminal narrowing. At C5-C6 there is mild left but no right foraminal narrowing. At C6-C7 there are surgical changes with ha   • History of Right rotator cuff tendinitis 12/11/2015 12/11/2015--x-ray of the cervical spine reveals anterior discectomy and fusion at C6-C7. There is  moderate disc space narrowing at C4-C5 and the remainder of the cervical disc spaces are within normal limits. There is moderately severe narrowing of the right neural foramen at C3-C5 and also moderate narrowing of the left neural foramen at this level. Mild bilateral foraminal narrowing is also pres   • History of tetanus, diphtheria, and acellular pertussis booster vaccination (Tdap) 2/16/2017 02/16/2017--TDaP given   • Hyperlipidemia 4/4/2016 03/18/2015--NMR reveals total cholesterol 216, triglycerides mildly elevated at 171. LDL particle number elevated at 1654. Small LDL particle number is normal at 446. HDL particle number normal at 33.4. Recommend diet and lifestyle changes.   • Multiple environmental allergies 11/2/2016    Patient has never had allergy testing.   • Non morbid obesity 4/4/2016 05/01/2017--patient seen in follow-up and reports she stopped the phentermine and topiramate due to side effects, specifically constipation.  Current weight is 232 pounds which is the same as her last visit.  She discontinued these medications and her symptoms resolved.  She restarted the medications and the symptoms returned.  03/14/2017--patient seen in follow-up and has lost 5 pounds.  She seems to be tolerating the medication well but does have a dry mouth.  I see no reason why we cannot continue the current regimen.  I will have her follow-up in about 5-6 weeks to reassess. Current weight is 232 pounds.  02/16/2017--patient presents with a several year history of weight gain despite multiple attempts at various diet and exercise regimens.  She has failed low carbohydrate diet.  He has not been to Weight Watchers which in my opinion is a farce.  She is asking about Saxenda which I think could be very helpful but I explained to her that it's extremely expensive and her insurance is likely not pay for i   • Vitamin D deficiency 4/4/2016         Past Surgical History:   Procedure Laterality Date   • CERVICAL  FUSION  07/23/2012 07/23/2012--anterior cervical spinal fusion, C6--C7 with instrumentation.   • COLONOSCOPY N/A 6/21/2016 06/21/2016--colonoscopy revealed a greater than 50 mm polyp at the ileocecal valve.  Pathology returned hyperplastic polyp.  A multilobulated 5 mm polyp was found in the transverse colon.  Pathology returned tubular adenoma with low grade dysplasia.  3 multilobulated polyps were found in the sigmoid colon.  Pathology revealed fragments of hyperplastic polyps.  2 multilobulated polyps were found in   • COLONOSCOPY N/A 11/22/2016 11/22/2016--colonoscopy revealed a polyp at the ileocecal valve and sigmoid colon.  There were 4 polyps at the rectosigmoid colon.  The single diverticulum in the sigmoid colon was not commented upon.  Pathology returned a sessile serrated adenoma at the ileocecal valve and the sigmoid colon.  The rectosigmoid polyp was hyperplastic.   • COLONOSCOPY W/ POLYPECTOMY  06/01/2017 06/01/2017--colonoscopy revealed hyperplastic ×2.  Perianal fistulotomy.  External hemorrhoidectomy of one column.   • ENDOMETRIAL ABLATION  2006 2006--endometrial ablation ×2 for menorrhagia/menorrhea.   • INCISION AND DRAINAGE PERIRECTAL ABSCESS N/A 9/2/2016 09/02/2016--incision and drainage of perirectal abscess.   • PARTIAL HYSTERECTOMY  10/2008    October 2008--partial hysterectomy for menorrhagia/menorrhea. Previously had endometrial ablation procedures 2. Pt states uterus was removed.   • RECTAL EXAMINATION UNDER ANESTHESIA N/A 6/1/2017 06/01/2017--colonoscopy revealed hyperplastic ×2.  Perianal fistulotomy.  External hemorrhoidectomy of one column.   • RECTAL FISTULOTOMY N/A 9/29/2017 09/29/2017--rectal fistulotomy.  Rectal exam under anesthesia.   • WRIST ARTHROPLASTY Right 07/15/2008    07/15/2008--right radial ulnar distal joint arthroplasty performed for non-traumatic arthritis/impingement syndrome.         No Known Allergies        Current Outpatient  "Prescriptions:   •  B Complex Vitamins (VITAMIN B COMPLEX PO), Take 1 capsule by mouth Daily., Disp: , Rfl:   •  cetirizine (zyrTEC) 10 MG tablet, Take 10 mg by mouth Daily., Disp: , Rfl:   •  Cholecalciferol (VITAMIN D3) 5000 UNITS tablet, Take 1 tablet by mouth Daily., Disp: , Rfl:   •  gabapentin (NEURONTIN) 600 MG tablet, TAKE 1 TABLET BY MOUTH TWICE A DAY, Disp: 60 tablet, Rfl: 0  •  Glucosamine HCl 1000 MG tablet, Take 1 tablet by mouth 2 (Two) Times a Day., Disp: , Rfl:   •  ibuprofen (ADVIL,MOTRIN) 200 MG tablet, Take 200 mg by mouth every 6 (six) hours as needed for mild pain (1-3)., Disp: , Rfl:   •  senna-docusate (PERICOLACE) 8.6-50 MG per tablet, Take 2 tablets by mouth Daily As Needed for Constipation., Disp: 30 tablet, Rfl: 1      Family History   Problem Relation Age of Onset   • Diabetes Mother         Type 2   • Diabetes Maternal Grandmother         Type 2   • Malig Hyperthermia Neg Hx    • Breast cancer Neg Hx    • Ovarian cancer Neg Hx    • Colon cancer Neg Hx    • Deep vein thrombosis Neg Hx    • Pulmonary embolism Neg Hx          Social History     Social History   • Marital status:      Spouse name: N/A   • Number of children: 2   • Years of education: 12     Occupational History   •       Social History Main Topics   • Smoking status: Current Every Day Smoker     Packs/day: 0.50     Years: 20.00     Types: Cigarettes   • Smokeless tobacco: Never Used   • Alcohol use Yes      Comment: Occasional   • Drug use: No   • Sexual activity: Yes     Partners: Male     Birth control/ protection: Surgical      Comment: [employer] [place] age 2007     Other Topics Concern   • Not on file     Social History Narrative   • No narrative on file         Vitals:    05/30/18 0753   BP: 110/80   Pulse: 87   SpO2: 95%   Weight: 110 kg (243 lb)   Height: 177.8 cm (70\")          Physical Exam:    General: Alert and oriented x 3.  No acute distress.  Normal affect.  HEENT: Pupils equal, " round, reactive to light; extraocular movements intact; sclerae nonicteric; pharynx, ear canals and TMs normal.  Neck: Without JVD, thyromegaly, bruit, or adenopathy.  Lungs: Clear to auscultation in all fields.  Heart: Regular rate and rhythm without murmur, rub, gallop, or click.  Abdomen: Soft, nontender, without hepatosplenomegaly or hernia.  Bowel sounds normal.  : Deferred.  Rectal: Deferred.  Extremities: Without clubbing, cyanosis, edema, or pulse deficit.  Neurologic: Intact without focal deficit.  Normal station and gait observed during ingress and egress from the examination room.  Skin: Without significant lesion.  Musculoskeletal: Unremarkable.  Specifically, there is good range of motion of the right hip but there is obviously discomfort in doing so.  Back exam not revealing.      Lab/other results:      Assessment/Plan:     Diagnosis Plan   1. Chronic right hip pain     2. Low back pain radiating to right lower extremity     3. Hyperlipidemia, unspecified hyperlipidemia type     4. Vitamin D deficiency     5. Therapeutic drug monitoring     6. Routine physical examination       Patient presents with a two-week history of right hip pain as described.  Possibilities include primary hip pathology as well as low back pain with right-sided lumbar radicular symptoms or possibly a combination of the 2.  Further evaluation indicated.  Patient has hyperlipidemia and needs a physical scheduled.    Plan is as follows: X-ray of the right hip and pelvis.  X-ray lumbar spine.  Patient will follow-up on the phone for the results of the near future and possible further instructions.  In the meantime, patient can take 4 Advil 3 times a day as needed.  This would be maximum dose.      Procedures

## 2018-05-31 ENCOUNTER — TELEPHONE (OUTPATIENT)
Dept: INTERNAL MEDICINE | Facility: CLINIC | Age: 53
End: 2018-05-31

## 2018-05-31 DIAGNOSIS — M51.36 DISC DEGENERATION, LUMBAR: ICD-10-CM

## 2018-05-31 DIAGNOSIS — M79.604 LOW BACK PAIN RADIATING TO RIGHT LOWER EXTREMITY: Primary | ICD-10-CM

## 2018-05-31 DIAGNOSIS — M54.50 LOW BACK PAIN RADIATING TO RIGHT LOWER EXTREMITY: Primary | ICD-10-CM

## 2018-05-31 LAB
25(OH)D3+25(OH)D2 SERPL-MCNC: 29 NG/ML (ref 30–100)
ALBUMIN SERPL-MCNC: 4.1 G/DL (ref 3.5–5.2)
ALBUMIN/GLOB SERPL: 1.8 G/DL
ALP SERPL-CCNC: 62 U/L (ref 39–117)
ALT SERPL-CCNC: 13 U/L (ref 1–33)
APPEARANCE UR: CLEAR
AST SERPL-CCNC: 12 U/L (ref 1–32)
BILIRUB SERPL-MCNC: 0.3 MG/DL (ref 0.1–1.2)
BILIRUB UR QL STRIP: NEGATIVE
BUN SERPL-MCNC: 11 MG/DL (ref 6–20)
BUN/CREAT SERPL: 14.9 (ref 7–25)
CALCIUM SERPL-MCNC: 9.4 MG/DL (ref 8.6–10.5)
CHLORIDE SERPL-SCNC: 103 MMOL/L (ref 98–107)
CHOLEST SERPL-MCNC: 238 MG/DL (ref 100–199)
CO2 SERPL-SCNC: 24.5 MMOL/L (ref 22–29)
COLOR UR: YELLOW
CREAT SERPL-MCNC: 0.74 MG/DL (ref 0.57–1)
ERYTHROCYTE [DISTWIDTH] IN BLOOD BY AUTOMATED COUNT: 13.6 % (ref 11.7–13)
GFR SERPLBLD CREATININE-BSD FMLA CKD-EPI: 100 ML/MIN/1.73
GFR SERPLBLD CREATININE-BSD FMLA CKD-EPI: 83 ML/MIN/1.73
GLOBULIN SER CALC-MCNC: 2.3 GM/DL
GLUCOSE SERPL-MCNC: 81 MG/DL (ref 65–99)
GLUCOSE UR QL: NEGATIVE
HCT VFR BLD AUTO: 44.5 % (ref 35.6–45.5)
HDL SERPL-SCNC: 33.6 UMOL/L
HDLC SERPL-MCNC: 50 MG/DL
HGB BLD-MCNC: 14.1 G/DL (ref 11.9–15.5)
HGB UR QL STRIP: NEGATIVE
KETONES UR QL STRIP: NEGATIVE
LDL SERPL QN: 21.3 NM
LDL SERPL-SCNC: 2215 NMOL/L
LDL SMALL SERPL-SCNC: 904 NMOL/L
LDLC SERPL CALC-MCNC: 149 MG/DL (ref 0–99)
LEUKOCYTE ESTERASE UR QL STRIP: NEGATIVE
MCH RBC QN AUTO: 29.6 PG (ref 26.9–32)
MCHC RBC AUTO-ENTMCNC: 31.7 G/DL (ref 32.4–36.3)
MCV RBC AUTO: 93.3 FL (ref 80.5–98.2)
NITRITE UR QL STRIP: NEGATIVE
PH UR STRIP: 6.5 [PH] (ref 5–8)
PLATELET # BLD AUTO: 244 10*3/MM3 (ref 140–500)
POTASSIUM SERPL-SCNC: 4.7 MMOL/L (ref 3.5–5.2)
PROT SERPL-MCNC: 6.4 G/DL (ref 6–8.5)
PROT UR QL STRIP: NEGATIVE
RBC # BLD AUTO: 4.77 10*6/MM3 (ref 3.9–5.2)
SODIUM SERPL-SCNC: 142 MMOL/L (ref 136–145)
SP GR UR: 1.02 (ref 1–1.03)
T3FREE SERPL-MCNC: 3.1 PG/ML (ref 2–4.4)
T4 FREE SERPL-MCNC: 1.12 NG/DL (ref 0.93–1.7)
TRIGL SERPL-MCNC: 194 MG/DL (ref 0–149)
TSH SERPL DL<=0.005 MIU/L-ACNC: 2.52 MIU/ML (ref 0.27–4.2)
UROBILINOGEN UR STRIP-MCNC: NORMAL MG/DL
WBC # BLD AUTO: 8.56 10*3/MM3 (ref 4.5–10.7)

## 2018-05-31 RX ORDER — PREDNISONE 10 MG/1
TABLET ORAL
Qty: 48 TABLET | Refills: 0 | Status: SHIPPED | OUTPATIENT
Start: 2018-05-31 | End: 2018-06-07

## 2018-05-31 NOTE — TELEPHONE ENCOUNTER
----- Message from Brock Alberto MD sent at 5/31/2018  1:34 PM EDT -----  Call patient and tell her she has lumbar disc degeneration and arthritis.  I sent in prednisone to the local [facility] pharmacy.  If her symptoms persist we may need MRI.      I called and left a message for the pt in case she didn't call before we left so she would know to  her rx

## 2018-06-07 ENCOUNTER — OFFICE VISIT (OUTPATIENT)
Dept: INTERNAL MEDICINE | Facility: CLINIC | Age: 53
End: 2018-06-07

## 2018-06-07 VITALS
HEART RATE: 75 BPM | BODY MASS INDEX: 35.65 KG/M2 | OXYGEN SATURATION: 95 % | RESPIRATION RATE: 16 BRPM | SYSTOLIC BLOOD PRESSURE: 124 MMHG | WEIGHT: 249 LBS | DIASTOLIC BLOOD PRESSURE: 74 MMHG | HEIGHT: 70 IN

## 2018-06-07 DIAGNOSIS — G89.29 CHRONIC RIGHT HIP PAIN: ICD-10-CM

## 2018-06-07 DIAGNOSIS — M25.551 CHRONIC RIGHT HIP PAIN: ICD-10-CM

## 2018-06-07 DIAGNOSIS — Z91.09 MULTIPLE ENVIRONMENTAL ALLERGIES: Chronic | ICD-10-CM

## 2018-06-07 DIAGNOSIS — M79.604 LOW BACK PAIN RADIATING TO RIGHT LOWER EXTREMITY: ICD-10-CM

## 2018-06-07 DIAGNOSIS — M50.320 DEGENERATION OF INTERVERTEBRAL DISC OF MID-CERVICAL REGION, UNSPECIFIED SPINAL LEVEL: Chronic | ICD-10-CM

## 2018-06-07 DIAGNOSIS — Z86.010 HISTORY OF COLON POLYPS: Chronic | ICD-10-CM

## 2018-06-07 DIAGNOSIS — E78.5 HYPERLIPIDEMIA, UNSPECIFIED HYPERLIPIDEMIA TYPE: Chronic | ICD-10-CM

## 2018-06-07 DIAGNOSIS — M51.36 DISC DEGENERATION, LUMBAR: ICD-10-CM

## 2018-06-07 DIAGNOSIS — E66.9 NON MORBID OBESITY: Chronic | ICD-10-CM

## 2018-06-07 DIAGNOSIS — M54.50 LOW BACK PAIN RADIATING TO RIGHT LOWER EXTREMITY: ICD-10-CM

## 2018-06-07 DIAGNOSIS — Z00.00 ROUTINE PHYSICAL EXAMINATION: Primary | ICD-10-CM

## 2018-06-07 DIAGNOSIS — J30.1 CHRONIC SEASONAL ALLERGIC RHINITIS DUE TO POLLEN: Chronic | ICD-10-CM

## 2018-06-07 PROBLEM — R63.5 ABNORMAL WEIGHT GAIN: Chronic | Status: RESOLVED | Noted: 2017-02-16 | Resolved: 2018-06-07

## 2018-06-07 PROCEDURE — 99396 PREV VISIT EST AGE 40-64: CPT | Performed by: INTERNAL MEDICINE

## 2018-06-07 PROCEDURE — 99213 OFFICE O/P EST LOW 20 MIN: CPT | Performed by: INTERNAL MEDICINE

## 2018-06-07 RX ORDER — PRAVASTATIN SODIUM 40 MG
TABLET ORAL
Qty: 30 TABLET | Refills: 6 | Status: SHIPPED | OUTPATIENT
Start: 2018-06-07 | End: 2018-11-20 | Stop reason: SDUPTHER

## 2018-06-07 NOTE — PROGRESS NOTES
06/07/2018    Patient Information  Rosemarie CHINCHILLA [address] [address]      1965  [phone]      Chief Complaint:     Routine physical examination and follow-up lab work.  Follow-up chronic pain and lumbar disc degeneration with chronic lower back pain.    History of Present Illness:    Patient with a history of chronic right hip pain, chronic lower back pain, and lumbar and cervical disc disease, hyperlipidemia, history of colon polyps, environmental allergies/allergic rhinitis.  She is here today for routine annual exam and follow-up lab work.  She currently is feeling better as far as her lower back and hip problems are concerned after initiation of prednisone.  As described below.  She has no new acute complaints.  Past medical history reviewed and updated where necessary including health maintenance parameters.  This reveals she will be up-to-date after today's visit with the exception of the new shingles vaccination.  I have recommended that she contact her insurance company regarding coverage.    The history regarding his lumbar disc degenerative disease, lumbar radiculopathy, chronic right hip pain:    06/07/2018--patient seen in follow-up and reports her hip pain is much better after starting prednisone.  She feels like symptoms are good enough that we can not proceed with any further evaluation or treatment at this time.    05/31/2018--x-ray of the right hip revealed no acute or chronic osseous or soft tissue abnormality.  X-ray of the lumbar spine reveals degenerative disc changes at L3-L4, L4-L5, and L5-S1.  Minor endplate osteophytic changes are noted at multiple levels.  No acute abnormality seen.  Diffuse facet arthritic changes at L4-L5 and L5-S1.  Recommend MRI lumbar spine if clinically indicated.  Patient notified and started on prednisone 50 mg by mouth daily ×5 days,  taper and discontinue.    05/30/2018--patient presents with approximately two-week history of right hip pain which seems to be located laterally and also radiates in towards the inguinal ligament region.  No known trauma.  The pain is worse with increasing weightbearing and also with sleeping on her right side.  No problems with sitting but the pain is definitely aggravated by standing up.  The pain seems to ease after she takes several steps  but does not totally resolve when she is standing.  There is a background of a dull ache.  No numbness or paresthesias.  Occasionally has right-sided lower back pain.  Examination reveals good rotation and range of motion of the right hip and there is no definite tenderness to percussion or palpation over the trochanteric bursal area.  Possibilities include primary hip pathology as well as lumbar radiculopathy.  Plan is as follows: X-ray of the left hip and pelvis.  X-ray of the lumbar spine.  Patient will follow-up on the phone for the results either later today or tomorrow and possible further instructions.      Review of Systems   Musculoskeletal: Positive for arthritis, back pain and neck pain.   All other systems reviewed and are negative.      Active Problems:    Patient Active Problem List   Diagnosis   • Degeneration of intervertebral disc of mid-cervical region   • Hallux valgus of left foot   • Hyperlipidemia   • Non morbid obesity   • Vitamin D deficiency   • Therapeutic drug monitoring   • Routine physical examination   • History of colon polyps, 11/22/2016-- adenoma ×2. 06/21/2016--tubular adenoma ×2.  Multiple hyperplastic.  Greater than 50 mm hyperplastic polyp at the ileocecal valve.  Rep   • Diverticulosis of colon   • Multiple environmental allergies   • Allergic rhinitis   • Chronic right hip pain   • Low back pain radiating to right lower extremity   • Disc degeneration, lumbar         Past Medical History:   Diagnosis Date   • Abnormal weight gain  2/16/2017 05/01/2017--patient seen in follow-up and reports she stopped the phentermine and topiramate due to side effects, specifically constipation.  Current weight is 232 pounds which is the same as her last visit. She discontinued these medications and her symptoms resolved.  She restarted the medications and the symptoms returned.   03/14/2017--patient seen in follow-up and has lost 5 pounds.  She seems to be tolerating the medication well but does have a dry mouth.  I see no reason why we cannot continue the current regimen.  I will have her follow-up in about 5-6 weeks to reassess.  Current weight is 232 pounds.  02/16/2017--patient presents with a several year history of weight gain despite multiple attempts at various diet and exercise regimens.  She has failed low carbohydrate diet.  He has not been to Weight Watchers which in my opinion is a farce.  She is asking about Saxenda which I think could be very helpful but I explained to her that it's extremely expensive and her insurance is likely not pay for   • Allergic rhinitis 11/2/2016    Patient has never had allergy testing.   • Degeneration of intervertebral disc of mid-cervical region 12/23/2015 12/23/2015--MRI cervical spine reveals at C4-C5 there is mild spinal stenosis. There is moderate to severe bilateral foraminal narrowing. At C5-C6 there is mild left but no right foraminal narrowing. At C6-C7 there are surgical changes with hardware and this results in artifact. Evaluation for solid bony fusion cannot be done because of this. There is only minimal if any canal narrowing at C6-C7. There is mild right and mild to moderate left bony foraminal narrowing. C7-T1 reveals minimal right foraminal narrowing.    • Disc degeneration, lumbar 5/31/2018 06/07/2018--patient seen in follow-up and reports her hip pain is much better after starting prednisone.  She feels like symptoms are good enough that we can not proceed with any further evaluation or  treatment at this time.  05/31/2018--x-ray of the right hip revealed no acute or chronic osseous or soft tissue abnormality.  X-ray of the lumbar spine reveals degenerative disc changes at L3-L4, L4-L5, and L5-S1.  Minor endplate osteophytic changes are noted at multiple levels.  No acute abnormality seen.  Diffuse facet arthritic changes at L4-L5 and L5-S1.  Recommend MRI lumbar spine if clinically indicated.  Patient notified and started on prednisone 50 mg by mouth daily ×5 days, taper and discontinue.  05/30/2018--patient presents with approximately two-week history of right hip pain which seems to be located laterally and also radiates in towards the inguinal ligament region.  No known trauma.  The pain is worse with increasing weightbearing and also with sleeping on her right side.  No problems with si   • Diverticulosis of colon 6/21/2016 11/22/2016--colonoscopy revealed a polyp at the ileocecal valve and sigmoid colon.  There were 4 polyps at the rectosigmoid colon.  The single diverticulum in the sigmoid colon was not commented upon.  Pathology returned a sessile serrated adenoma at the ileocecal valve and the sigmoid colon.  The rectosigmoid polyp was hyperplastic.  06/21/2016--colonoscopy revealed a greater than 50 mm polyp at the ileocecal valve.  Pathology returned hyperplastic polyp.  A multilobulated 5 mm polyp was found in the transverse colon.  Pathology returned tubular adenoma with low grade dysplasia.  3 multilobulated polyps were found in the sigmoid colon.  Pathology revealed fragments of hyperplastic polyps.  2 multilobulated polyps were found in the rectosigmoid colon and pathology returned tubular adenoma with low-grade dysplasia.  A single small mouth diverticula and was found in the sigmoid.  Nonbleeding internal hemorrhoids.  General surgery recommended repeat colonoscopy in 3 months.   • Hallux valgus of left foot 4/4/2016 03/13/2015--patient was evaluated by the podiatrist and  assessment was ingrown great toenail and paronychia. Partial nail avulsion completed.   03/13/2015--patient presents with a greater than one-year history of intermittent pain, swelling, redness, tenderness involving her left great toe. Examination reveals early hallux valgus deformity as well as a possible ingrown nail. Patient referred to podiatry. Uric acid and CRP obtained to rule out possibility of gout and both were normal.   • History of colon polyps, 11/22/2016-- adenoma ×2. 06/21/2016--tubular adenoma ×2.  Multiple hyperplastic.  Greater than 50 mm hyperplastic polyp at the ileocecal valve.  Rep 6/21/2016 06/01/2017--colonoscopy revealed hyperplastic ×2.  Perianal fistulotomy.  External hemorrhoidectomy of one column.  11/22/2016--colonoscopy revealed a polyp at the ileocecal valve and sigmoid colon.  There were 4 polyps at the rectosigmoid colon.  The single diverticulum in the sigmoid colon was not commented upon.  Pathology returned a sessile serrated adenoma at the ileocecal valve and the sigmoid colon.  The rectosigmoid polyp was hyperplastic.  06/21/2016--colonoscopy revealed a greater than 50 mm polyp at the ileocecal valve.  Pathology returned hyperplastic polyp.  A multilobulated 5 mm polyp was found in the transverse colon.  Pathology returned tubular adenoma with low grade dysplasia.  3 multilobulated polyps were found in the sigmoid colon.  Pathology revealed fragments of hyperplastic polyps.  2 multilobulated polyps were found in the rectosigmoid colon and pathology returned tubular adenoma with low-grade dysplasia.  A single small mouth diverticula and was found in the sigmoid.  Nonbleeding i   • History of Perirectal abscess 9/1/2016 09/12/2016--patient seen in follow-up for dressing change.  Patient is concerned about not being on an antibiotic and I explained to her that the definitive treatment is incision and drainage.  On exam the wound looks very good with no sign of  purulent discharge.  She has a follow-up with general surgeon tomorrow.  09/02/2016--incision and drainage of left perirectal abscess by general surgery.  Cultures returned moderate growth of Klebsiella pneumoniae and heavy growth of Streptococcus agalactiae.   09/01/2016--patient presents with approximately 4 month history of a painful and swollen subcutaneous mass left buttock that is draining foul-smelling purulent material.  Examination confirms a large subcutaneous mass/abscess that I suspect is an infected sebaceous cyst.  General surgery referral given.   • Hyperlipidemia 4/4/2016 03/18/2015--NMR reveals total cholesterol 216, triglycerides mildly elevated at 171. LDL particle number elevated at 1654. Small LDL particle number is normal at 446. HDL particle number normal at 33.4. Recommend diet and lifestyle changes.   • Multiple environmental allergies 11/2/2016    Patient has never had allergy testing.   • Non morbid obesity 4/4/2016 05/01/2017--patient seen in follow-up and reports she stopped the phentermine and topiramate due to side effects, specifically constipation.  Current weight is 232 pounds which is the same as her last visit.  She discontinued these medications and her symptoms resolved.  She restarted the medications and the symptoms returned.  03/14/2017--patient seen in follow-up and has lost 5 pounds.  She seems to be tolerating the medication well but does have a dry mouth.  I see no reason why we cannot continue the current regimen.  I will have her follow-up in about 5-6 weeks to reassess. Current weight is 232 pounds.  02/16/2017--patient presents with a several year history of weight gain despite multiple attempts at various diet and exercise regimens.  She has failed low carbohydrate diet.  He has not been to Weight Watchers which in my opinion is a farce.  She is asking about Saxenda which I think could be very helpful but I explained to her that it's extremely expensive and her  insurance is likely not pay for i   • Vitamin D deficiency 4/4/2016         Past Surgical History:   Procedure Laterality Date   • CERVICAL FUSION  07/23/2012 07/23/2012--anterior cervical spinal fusion, C6--C7 with instrumentation.   • COLONOSCOPY N/A 6/21/2016 06/21/2016--colonoscopy revealed a greater than 50 mm polyp at the ileocecal valve.  Pathology returned hyperplastic polyp.  A multilobulated 5 mm polyp was found in the transverse colon.  Pathology returned tubular adenoma with low grade dysplasia.  3 multilobulated polyps were found in the sigmoid colon.  Pathology revealed fragments of hyperplastic polyps.  2 multilobulated polyps were found in   • COLONOSCOPY N/A 11/22/2016 11/22/2016--colonoscopy revealed a polyp at the ileocecal valve and sigmoid colon.  There were 4 polyps at the rectosigmoid colon.  The single diverticulum in the sigmoid colon was not commented upon.  Pathology returned a sessile serrated adenoma at the ileocecal valve and the sigmoid colon.  The rectosigmoid polyp was hyperplastic.   • COLONOSCOPY W/ POLYPECTOMY  06/01/2017 06/01/2017--colonoscopy revealed hyperplastic ×2.  Perianal fistulotomy.  External hemorrhoidectomy of one column.   • ENDOMETRIAL ABLATION  2006 2006--endometrial ablation ×2 for menorrhagia/menorrhea.   • INCISION AND DRAINAGE PERIRECTAL ABSCESS N/A 9/2/2016 09/02/2016--incision and drainage of perirectal abscess.   • PARTIAL HYSTERECTOMY  10/2008    October 2008--partial hysterectomy for menorrhagia/menorrhea. Previously had endometrial ablation procedures 2. Pt states uterus was removed.   • RECTAL EXAMINATION UNDER ANESTHESIA N/A 6/1/2017 06/01/2017--colonoscopy revealed hyperplastic ×2.  Perianal fistulotomy.  External hemorrhoidectomy of one column.   • RECTAL FISTULOTOMY N/A 9/29/2017 09/29/2017--rectal fistulotomy.  Rectal exam under anesthesia.   • WRIST ARTHROPLASTY Right 07/15/2008    07/15/2008--right radial ulnar distal joint  arthroplasty performed for non-traumatic arthritis/impingement syndrome.         No Known Allergies        Current Outpatient Prescriptions:   •  B Complex Vitamins (VITAMIN B COMPLEX PO), Take 1 capsule by mouth Daily., Disp: , Rfl:   •  cetirizine (zyrTEC) 10 MG tablet, Take 10 mg by mouth Daily., Disp: , Rfl:   •  Cholecalciferol (VITAMIN D3) 5000 UNITS tablet, Take 1 tablet by mouth Daily., Disp: , Rfl:   •  gabapentin (NEURONTIN) 600 MG tablet, TAKE 1 TABLET BY MOUTH TWICE A DAY, Disp: 60 tablet, Rfl: 0  •  Glucosamine HCl 1000 MG tablet, Take 1 tablet by mouth 2 (Two) Times a Day., Disp: , Rfl:   •  ibuprofen (ADVIL,MOTRIN) 200 MG tablet, Take 200 mg by mouth every 6 (six) hours as needed for mild pain (1-3)., Disp: , Rfl:   •  predniSONE (DELTASONE) 10 MG tablet, 5 by mouth daily 5 days, then 4 daily 2 days, 3 daily 2 days, 2 daily 2 days, 1 daily 2 days, one half daily 2 days; D/C, Disp: 48 tablet, Rfl: 0  •  senna-docusate (PERICOLACE) 8.6-50 MG per tablet, Take 2 tablets by mouth Daily As Needed for Constipation., Disp: 30 tablet, Rfl: 1      Family History   Problem Relation Age of Onset   • Diabetes Mother         Type 2   • Diabetes Maternal Grandmother         Type 2   • Malig Hyperthermia Neg Hx    • Breast cancer Neg Hx    • Ovarian cancer Neg Hx    • Colon cancer Neg Hx    • Deep vein thrombosis Neg Hx    • Pulmonary embolism Neg Hx          Social History     Social History   • Marital status:      Spouse name: N/A   • Number of children: 2   • Years of education: 12     Occupational History   •       Social History Main Topics   • Smoking status: Current Every Day Smoker     Packs/day: 0.50     Years: 20.00     Types: Cigarettes   • Smokeless tobacco: Never Used   • Alcohol use Yes      Comment: Occasional   • Drug use: No   • Sexual activity: Yes     Partners: Male     Birth control/ protection: Surgical      Comment: [employer] [place] age 2007     Other Topics Concern  "  • Not on file     Social History Narrative   • No narrative on file         Vitals:    06/07/18 1401   BP: 124/74   BP Location: Right arm   Patient Position: Sitting   Cuff Size: Adult   Pulse: 75   Resp: 16   SpO2: 95%   Weight: 113 kg (249 lb)   Height: 177.8 cm (70\")          Physical Exam:    General: Alert and oriented x 3, with appropriate affect; no acute distress. Obese. HEENT: pupils equal, round, and reactive to light; extraocular movements intact; sclera nonicteric; nasal mucosa normal; pharynx normal; tympanic membranes and ear canals normal.  Neck: without JVD, thyromegaly, bruit, or adenopathy.  Lungs: clear to auscultation in all fields.  Heart: auscultation reveals regular rate and rhythm without murmur, rub, gallop, or click.  Abdomen: is soft and nontender, without hepatosplenomegaly, mass or hernia. Normal bowel sounds.  GYN, Digital rectal exam, Breast; deferred to gynecologist.  Extremities: are without clubbing, cyanosis, or edema.  Vascular: no signs of peripheral arterial disease or venous insufficiency/varicosities.  Neurological: intact without focal deficit, including cranial and peripheral nerves.  Station and gait observed to be normal during ingress and egress from the examination area.  Sensation and deep tendon reflexes tested if clinically indicated and are normal.  Musculoskeletal: exam is normal, without signs of synovitis, significant degeneration or deformity. Skin examination: without rash or significant lesions.    Lab/other results:    Reviewed the results of the x-ray of the lumbar spine and the x-ray of the right hip.    NMR reveals a total cholesterol 238.  Triglycerides elevated at 194.  LDL particle number elevated at 2215.  Small LDL particle number elevated at 904.  HDL particle number is normal at 33.6.  CMP normal.  Urinalysis normal.  CBC essentially normal.  Thyroid function tests normal.  Adamant DD is a little low at 29.    Assessment/Plan:     Diagnosis Plan "   1. Routine physical examination     2. Chronic right hip pain     3. Low back pain radiating to right lower extremity     4. Disc degeneration, lumbar     5. Hyperlipidemia, unspecified hyperlipidemia type     6. Degeneration of intervertebral disc of mid-cervical region, unspecified spinal level     7. History of colon polyps, 11/22/2016-- adenoma ×2. 06/21/2016--tubular adenoma ×2.  Multiple hyperplastic.  Greater than 50 mm hyperplastic polyp at the ileocecal valve.  Rep     8. Multiple environmental allergies     9. Allergic rhinitis     10. Non morbid obesity       Patient presents with essentially normal annual exam except for the following issues: Her right hip pain is likely related to lumbar disc degeneration which has improved with prednisone.  Patient should continue and complete the course of prednisone.  If her symptoms recur then we may need to proceed with further evaluation including MRI.  Patient does have significant hyperlipidemia which is actually worse than it had been previously.  Review the previous lipid profiles reveal that her cholesterol is bad enough to consider medical management.  Her environmental allergies/allergic rhinitis seems stable at this time.    Plan is as follows: Start pravastatin 40 mg per day.  Patient will follow-up in about 6 weeks with lab prior to reassess.  In the meantime, she should continue to work on diet and weight loss.        Procedures

## 2018-06-13 RX ORDER — GABAPENTIN 600 MG/1
TABLET ORAL
Qty: 60 TABLET | Refills: 1 | OUTPATIENT
Start: 2018-06-13 | End: 2018-08-13 | Stop reason: SDUPTHER

## 2018-07-10 DIAGNOSIS — E78.5 HYPERLIPIDEMIA, UNSPECIFIED HYPERLIPIDEMIA TYPE: Chronic | ICD-10-CM

## 2018-07-19 LAB
ALBUMIN SERPL-MCNC: 4.5 G/DL (ref 3.5–5.2)
ALBUMIN/GLOB SERPL: 2.5 G/DL
ALP SERPL-CCNC: 59 U/L (ref 39–117)
ALT SERPL-CCNC: 14 U/L (ref 1–33)
AST SERPL-CCNC: 12 U/L (ref 1–32)
BILIRUB SERPL-MCNC: 0.4 MG/DL (ref 0.1–1.2)
BUN SERPL-MCNC: 8 MG/DL (ref 6–20)
BUN/CREAT SERPL: 10 (ref 7–25)
CALCIUM SERPL-MCNC: 9.4 MG/DL (ref 8.6–10.5)
CHLORIDE SERPL-SCNC: 106 MMOL/L (ref 98–107)
CHOLEST SERPL-MCNC: 180 MG/DL (ref 100–199)
CK SERPL-CCNC: 143 U/L (ref 20–180)
CO2 SERPL-SCNC: 24.8 MMOL/L (ref 22–29)
CREAT SERPL-MCNC: 0.8 MG/DL (ref 0.57–1)
GLOBULIN SER CALC-MCNC: 1.8 GM/DL
GLUCOSE SERPL-MCNC: 87 MG/DL (ref 65–99)
HDL SERPL-SCNC: 32.9 UMOL/L
HDLC SERPL-MCNC: 49 MG/DL
LDL SERPL QN: 21.2 NM
LDL SERPL-SCNC: 1298 NMOL/L
LDL SMALL SERPL-SCNC: 627 NMOL/L
LDLC SERPL CALC-MCNC: 100 MG/DL (ref 0–99)
POTASSIUM SERPL-SCNC: 4.5 MMOL/L (ref 3.5–5.2)
PROT SERPL-MCNC: 6.3 G/DL (ref 6–8.5)
SODIUM SERPL-SCNC: 144 MMOL/L (ref 136–145)
TRIGL SERPL-MCNC: 157 MG/DL (ref 0–149)

## 2018-07-24 ENCOUNTER — OFFICE VISIT (OUTPATIENT)
Dept: INTERNAL MEDICINE | Facility: CLINIC | Age: 53
End: 2018-07-24

## 2018-07-24 VITALS
WEIGHT: 244.8 LBS | HEART RATE: 73 BPM | OXYGEN SATURATION: 99 % | DIASTOLIC BLOOD PRESSURE: 66 MMHG | SYSTOLIC BLOOD PRESSURE: 112 MMHG | BODY MASS INDEX: 35.05 KG/M2 | HEIGHT: 70 IN

## 2018-07-24 DIAGNOSIS — G89.29 CHRONIC RIGHT HIP PAIN: Chronic | ICD-10-CM

## 2018-07-24 DIAGNOSIS — M79.604 LOW BACK PAIN RADIATING TO RIGHT LOWER EXTREMITY: Chronic | ICD-10-CM

## 2018-07-24 DIAGNOSIS — M54.50 LOW BACK PAIN RADIATING TO RIGHT LOWER EXTREMITY: Chronic | ICD-10-CM

## 2018-07-24 DIAGNOSIS — Z51.81 THERAPEUTIC DRUG MONITORING: ICD-10-CM

## 2018-07-24 DIAGNOSIS — M25.551 CHRONIC RIGHT HIP PAIN: Chronic | ICD-10-CM

## 2018-07-24 DIAGNOSIS — E78.5 HYPERLIPIDEMIA, UNSPECIFIED HYPERLIPIDEMIA TYPE: Primary | Chronic | ICD-10-CM

## 2018-07-24 DIAGNOSIS — M51.36 DISC DEGENERATION, LUMBAR: Chronic | ICD-10-CM

## 2018-07-24 PROCEDURE — 99214 OFFICE O/P EST MOD 30 MIN: CPT | Performed by: INTERNAL MEDICINE

## 2018-07-24 NOTE — PROGRESS NOTES
07/24/2018    Patient Information  Rosemarie CHINCHILLA [address] [address]      1965  [phone]      Chief Complaint:     Follow-up hyperlipidemia and recent medication addition.  Complaining of worsening lower back pain with radiation in the right lower extremity.    History of Present Illness:    Patient with a history of hyperlipidemia, colon polyps, environmental allergies/allergic rhinitis, chronic lower back pain due to lumbar disc disease.  She presents today primarily to follow-up on her cholesterol after starting pravastatin ear she seems to be tolerating that well.  However, patient is having worsening lower back pain with right sided lumbar radicular symptoms and this will be described below.  Her past medical history reviewed and updated where necessary including health maintenance parameters.  This reveals she is up-to-date with the exception of the new shingles vaccination.    Review of Systems   Constitution: Negative.   HENT: Negative.    Eyes: Negative.    Cardiovascular: Negative.    Respiratory: Negative.    Endocrine: Negative.    Hematologic/Lymphatic: Negative.    Skin: Negative.    Musculoskeletal: Positive for back pain. Negative for muscle weakness.   Gastrointestinal: Negative.    Genitourinary: Negative.    Neurological: Positive for numbness and paresthesias.   Psychiatric/Behavioral: Negative.    Allergic/Immunologic: Negative.        Active Problems:    Patient Active Problem List   Diagnosis   • Degeneration of intervertebral disc of mid-cervical region   • Hallux valgus of left foot   • Hyperlipidemia   • Non morbid obesity   • Vitamin D deficiency   • Therapeutic drug monitoring   • Routine physical examination   • History of colon polyps, 11/22/2016-- adenoma ×2. 06/21/2016--tubular adenoma ×2.  Multiple hyperplastic.  Greater than 50 mm hyperplastic  polyp at the ileocecal valve.  Rep   • Diverticulosis of colon   • Multiple environmental allergies   • Allergic rhinitis   • Chronic right hip pain   • Low back pain radiating to right lower extremity   • Disc degeneration, lumbar         Past Medical History:   Diagnosis Date   • Abnormal weight gain 2/16/2017 05/01/2017--patient seen in follow-up and reports she stopped the phentermine and topiramate due to side effects, specifically constipation.  Current weight is 232 pounds which is the same as her last visit. She discontinued these medications and her symptoms resolved.  She restarted the medications and the symptoms returned.   03/14/2017--patient seen in follow-up and has lost 5 pounds.  She seems to be tolerating the medication well but does have a dry mouth.  I see no reason why we cannot continue the current regimen.  I will have her follow-up in about 5-6 weeks to reassess.  Current weight is 232 pounds.  02/16/2017--patient presents with a several year history of weight gain despite multiple attempts at various diet and exercise regimens.  She has failed low carbohydrate diet.  He has not been to Weight Watchers which in my opinion is a farce.  She is asking about Saxenda which I think could be very helpful but I explained to her that it's extremely expensive and her insurance is likely not pay for   • Allergic rhinitis 11/2/2016    Patient has never had allergy testing.   • Degeneration of intervertebral disc of mid-cervical region 12/23/2015 12/23/2015--MRI cervical spine reveals at C4-C5 there is mild spinal stenosis. There is moderate to severe bilateral foraminal narrowing. At C5-C6 there is mild left but no right foraminal narrowing. At C6-C7 there are surgical changes with hardware and this results in artifact. Evaluation for solid bony fusion cannot be done because of this. There is only minimal if any canal narrowing at C6-C7. There is mild right and mild to moderate left bony foraminal  narrowing. C7-T1 reveals minimal right foraminal narrowing.    • Disc degeneration, lumbar 5/31/2018 06/07/2018--patient seen in follow-up and reports her hip pain is much better after starting prednisone.  She feels like symptoms are good enough that we can not proceed with any further evaluation or treatment at this time.  05/31/2018--x-ray of the right hip revealed no acute or chronic osseous or soft tissue abnormality.  X-ray of the lumbar spine reveals degenerative disc changes at L3-L4, L4-L5, and L5-S1.  Minor endplate osteophytic changes are noted at multiple levels.  No acute abnormality seen.  Diffuse facet arthritic changes at L4-L5 and L5-S1.  Recommend MRI lumbar spine if clinically indicated.  Patient notified and started on prednisone 50 mg by mouth daily ×5 days, taper and discontinue.  05/30/2018--patient presents with approximately two-week history of right hip pain which seems to be located laterally and also radiates in towards the inguinal ligament region.  No known trauma.  The pain is worse with increasing weightbearing and also with sleeping on her right side.  No problems with si   • Diverticulosis of colon 6/21/2016 11/22/2016--colonoscopy revealed a polyp at the ileocecal valve and sigmoid colon.  There were 4 polyps at the rectosigmoid colon.  The single diverticulum in the sigmoid colon was not commented upon.  Pathology returned a sessile serrated adenoma at the ileocecal valve and the sigmoid colon.  The rectosigmoid polyp was hyperplastic.  06/21/2016--colonoscopy revealed a greater than 50 mm polyp at the ileocecal valve.  Pathology returned hyperplastic polyp.  A multilobulated 5 mm polyp was found in the transverse colon.  Pathology returned tubular adenoma with low grade dysplasia.  3 multilobulated polyps were found in the sigmoid colon.  Pathology revealed fragments of hyperplastic polyps.  2 multilobulated polyps were found in the rectosigmoid colon and pathology returned  tubular adenoma with low-grade dysplasia.  A single small mouth diverticula and was found in the sigmoid.  Nonbleeding internal hemorrhoids.  General surgery recommended repeat colonoscopy in 3 months.   • Hallux valgus of left foot 4/4/2016 03/13/2015--patient was evaluated by the podiatrist and assessment was ingrown great toenail and paronychia. Partial nail avulsion completed.   03/13/2015--patient presents with a greater than one-year history of intermittent pain, swelling, redness, tenderness involving her left great toe. Examination reveals early hallux valgus deformity as well as a possible ingrown nail. Patient referred to podiatry. Uric acid and CRP obtained to rule out possibility of gout and both were normal.   • History of colon polyps, 11/22/2016-- adenoma ×2. 06/21/2016--tubular adenoma ×2.  Multiple hyperplastic.  Greater than 50 mm hyperplastic polyp at the ileocecal valve.  Rep 6/21/2016 06/01/2017--colonoscopy revealed hyperplastic ×2.  Perianal fistulotomy.  External hemorrhoidectomy of one column.  11/22/2016--colonoscopy revealed a polyp at the ileocecal valve and sigmoid colon.  There were 4 polyps at the rectosigmoid colon.  The single diverticulum in the sigmoid colon was not commented upon.  Pathology returned a sessile serrated adenoma at the ileocecal valve and the sigmoid colon.  The rectosigmoid polyp was hyperplastic.  06/21/2016--colonoscopy revealed a greater than 50 mm polyp at the ileocecal valve.  Pathology returned hyperplastic polyp.  A multilobulated 5 mm polyp was found in the transverse colon.  Pathology returned tubular adenoma with low grade dysplasia.  3 multilobulated polyps were found in the sigmoid colon.  Pathology revealed fragments of hyperplastic polyps.  2 multilobulated polyps were found in the rectosigmoid colon and pathology returned tubular adenoma with low-grade dysplasia.  A single small mouth diverticula and was found in the sigmoid.   Nonbleeding i   • History of Perirectal abscess 9/1/2016 09/12/2016--patient seen in follow-up for dressing change.  Patient is concerned about not being on an antibiotic and I explained to her that the definitive treatment is incision and drainage.  On exam the wound looks very good with no sign of purulent discharge.  She has a follow-up with general surgeon tomorrow.  09/02/2016--incision and drainage of left perirectal abscess by general surgery.  Cultures returned moderate growth of Klebsiella pneumoniae and heavy growth of Streptococcus agalactiae.   09/01/2016--patient presents with approximately 4 month history of a painful and swollen subcutaneous mass left buttock that is draining foul-smelling purulent material.  Examination confirms a large subcutaneous mass/abscess that I suspect is an infected sebaceous cyst.  General surgery referral given.   • Hyperlipidemia 4/4/2016 03/18/2015--NMR reveals total cholesterol 216, triglycerides mildly elevated at 171. LDL particle number elevated at 1654. Small LDL particle number is normal at 446. HDL particle number normal at 33.4. Recommend diet and lifestyle changes.   • Multiple environmental allergies 11/2/2016    Patient has never had allergy testing.   • Non morbid obesity 4/4/2016 05/01/2017--patient seen in follow-up and reports she stopped the phentermine and topiramate due to side effects, specifically constipation.  Current weight is 232 pounds which is the same as her last visit.  She discontinued these medications and her symptoms resolved.  She restarted the medications and the symptoms returned.  03/14/2017--patient seen in follow-up and has lost 5 pounds.  She seems to be tolerating the medication well but does have a dry mouth.  I see no reason why we cannot continue the current regimen.  I will have her follow-up in about 5-6 weeks to reassess. Current weight is 232 pounds.  02/16/2017--patient presents with a several year history of  weight gain despite multiple attempts at various diet and exercise regimens.  She has failed low carbohydrate diet.  He has not been to Weight Watchers which in my opinion is a farce.  She is asking about Saxenda which I think could be very helpful but I explained to her that it's extremely expensive and her insurance is likely not pay for i   • Vitamin D deficiency 4/4/2016         Past Surgical History:   Procedure Laterality Date   • CERVICAL FUSION  07/23/2012 07/23/2012--anterior cervical spinal fusion, C6--C7 with instrumentation.   • COLONOSCOPY N/A 6/21/2016 06/21/2016--colonoscopy revealed a greater than 50 mm polyp at the ileocecal valve.  Pathology returned hyperplastic polyp.  A multilobulated 5 mm polyp was found in the transverse colon.  Pathology returned tubular adenoma with low grade dysplasia.  3 multilobulated polyps were found in the sigmoid colon.  Pathology revealed fragments of hyperplastic polyps.  2 multilobulated polyps were found in   • COLONOSCOPY N/A 11/22/2016 11/22/2016--colonoscopy revealed a polyp at the ileocecal valve and sigmoid colon.  There were 4 polyps at the rectosigmoid colon.  The single diverticulum in the sigmoid colon was not commented upon.  Pathology returned a sessile serrated adenoma at the ileocecal valve and the sigmoid colon.  The rectosigmoid polyp was hyperplastic.   • COLONOSCOPY W/ POLYPECTOMY  06/01/2017 06/01/2017--colonoscopy revealed hyperplastic ×2.  Perianal fistulotomy.  External hemorrhoidectomy of one column.   • ENDOMETRIAL ABLATION  2006 2006--endometrial ablation ×2 for menorrhagia/menorrhea.   • INCISION AND DRAINAGE PERIRECTAL ABSCESS N/A 9/2/2016 09/02/2016--incision and drainage of perirectal abscess.   • PARTIAL HYSTERECTOMY  10/2008    October 2008--partial hysterectomy for menorrhagia/menorrhea. Previously had endometrial ablation procedures 2. Pt states uterus was removed.   • RECTAL EXAMINATION UNDER ANESTHESIA N/A  6/1/2017 06/01/2017--colonoscopy revealed hyperplastic ×2.  Perianal fistulotomy.  External hemorrhoidectomy of one column.   • RECTAL FISTULOTOMY N/A 9/29/2017 09/29/2017--rectal fistulotomy.  Rectal exam under anesthesia.   • WRIST ARTHROPLASTY Right 07/15/2008    07/15/2008--right radial ulnar distal joint arthroplasty performed for non-traumatic arthritis/impingement syndrome.         No Known Allergies        Current Outpatient Prescriptions:   •  B Complex Vitamins (VITAMIN B COMPLEX PO), Take 1 capsule by mouth Daily., Disp: , Rfl:   •  cetirizine (zyrTEC) 10 MG tablet, Take 10 mg by mouth Daily., Disp: , Rfl:   •  Cholecalciferol (VITAMIN D3) 5000 UNITS tablet, Take 1 tablet by mouth Daily., Disp: , Rfl:   •  gabapentin (NEURONTIN) 600 MG tablet, TAKE ONE TABLET BY MOUTH TWO TIMES DAILY, Disp: 60 tablet, Rfl: 1  •  Glucosamine HCl 1000 MG tablet, Take 1 tablet by mouth 2 (Two) Times a Day., Disp: , Rfl:   •  ibuprofen (ADVIL,MOTRIN) 200 MG tablet, Take 200 mg by mouth every 6 (six) hours as needed for mild pain (1-3)., Disp: , Rfl:   •  pravastatin (PRAVACHOL) 40 MG tablet, 1 by mouth daily for high cholesterol, Disp: 30 tablet, Rfl: 6  •  senna-docusate (PERICOLACE) 8.6-50 MG per tablet, Take 2 tablets by mouth Daily As Needed for Constipation., Disp: 30 tablet, Rfl: 1      Family History   Problem Relation Age of Onset   • Diabetes Mother         Type 2   • Diabetes Maternal Grandmother         Type 2   • Malig Hyperthermia Neg Hx    • Breast cancer Neg Hx    • Ovarian cancer Neg Hx    • Colon cancer Neg Hx    • Deep vein thrombosis Neg Hx    • Pulmonary embolism Neg Hx          Social History     Social History   • Marital status:      Spouse name: N/A   • Number of children: 2   • Years of education: 12     Occupational History   •       Social History Main Topics   • Smoking status: Current Every Day Smoker     Packs/day: 0.50     Years: 20.00     Types: Cigarettes  "  • Smokeless tobacco: Never Used   • Alcohol use Yes      Comment: Occasional   • Drug use: No   • Sexual activity: Yes     Partners: Male     Birth control/ protection: Surgical      Comment: [employer] [place] age 2007     Other Topics Concern   • Not on file     Social History Narrative   • No narrative on file         Vitals:    07/24/18 1417   BP: 112/66   BP Location: Left arm   Patient Position: Standing   Pulse: 73   SpO2: 99%   Weight: 111 kg (244 lb 12.8 oz)   Height: 177.8 cm (70\")          Physical Exam:    General: Alert and oriented x 3.  No acute distress.  Normal affect.  HEENT: Pupils equal, round, reactive to light; extraocular movements intact; sclerae nonicteric; pharynx, ear canals and TMs normal.  Neck: Without JVD, thyromegaly, bruit, or adenopathy.  Lungs: Clear to auscultation in all fields.  Heart: Regular rate and rhythm without murmur, rub, gallop, or click.  Abdomen: Soft, nontender, without hepatosplenomegaly or hernia.  Bowel sounds normal.  : Deferred.  Rectal: Deferred.  Extremities: Without clubbing, cyanosis, edema, or pulse deficit.  Neurologic: Intact without focal deficit.  Normal station and gait observed during ingress and egress from the examination room.  Skin: Without significant lesion.  Musculoskeletal: Unremarkable.  Straight leg raising is not definitely positive on the right.      Lab/other results:    I reviewed the results of the previous right hip x-rays and lumbar spine x-rays.    NMR reveals a total cholesterol 180.  Triglycerides slightly elevated at 157.  LDL particle number is mildly elevated 1298.  Small LDL particle number mildly elevated at 627.  HDL particle number is normal at 32.9.  CMP normal.  CPK normal.    Assessment/Plan:     Diagnosis Plan   1. Hyperlipidemia, unspecified hyperlipidemia type     2. Low back pain radiating to right lower extremity     3. Chronic right hip pain     4. Disc degeneration, lumbar     5. Therapeutic drug monitoring   "       Patient has hyperlipidemia and was recently started on pravastatin which she is tolerating well.  Her NMR profile is much better although not perfect.  I do think that it is adequate.  She has chronic lower back pain related to lumbar disc degeneration and her symptoms are worsening.  She has failed conservative therapy including chiropractic care and medication.  Further evaluation is indicated.    Plan is as follows: MRI of the lumbar spine ordered.  Patient will follow-up after the results are known.  I will leave it up to her whether or not she wants to continue chiropractic care at least for the time being.      Procedures

## 2018-08-04 ENCOUNTER — HOSPITAL ENCOUNTER (OUTPATIENT)
Dept: MRI IMAGING | Facility: HOSPITAL | Age: 53
Discharge: HOME | End: 2018-08-04
Admitting: INTERNAL MEDICINE

## 2018-08-04 DIAGNOSIS — G89.29 CHRONIC RIGHT HIP PAIN: Chronic | ICD-10-CM

## 2018-08-04 DIAGNOSIS — M79.604 LOW BACK PAIN RADIATING TO RIGHT LOWER EXTREMITY: Chronic | ICD-10-CM

## 2018-08-04 DIAGNOSIS — M54.50 LOW BACK PAIN RADIATING TO RIGHT LOWER EXTREMITY: Chronic | ICD-10-CM

## 2018-08-04 DIAGNOSIS — M25.551 CHRONIC RIGHT HIP PAIN: Chronic | ICD-10-CM

## 2018-08-04 PROCEDURE — 72148 MRI LUMBAR SPINE W/O DYE: CPT

## 2018-08-08 ENCOUNTER — OFFICE VISIT (OUTPATIENT)
Dept: INTERNAL MEDICINE | Facility: CLINIC | Age: 53
End: 2018-08-08

## 2018-08-08 VITALS
WEIGHT: 241 LBS | OXYGEN SATURATION: 100 % | HEART RATE: 60 BPM | HEIGHT: 70 IN | DIASTOLIC BLOOD PRESSURE: 68 MMHG | RESPIRATION RATE: 20 BRPM | BODY MASS INDEX: 34.5 KG/M2 | SYSTOLIC BLOOD PRESSURE: 122 MMHG

## 2018-08-08 DIAGNOSIS — E78.5 HYPERLIPIDEMIA, UNSPECIFIED HYPERLIPIDEMIA TYPE: Chronic | ICD-10-CM

## 2018-08-08 DIAGNOSIS — Z51.81 THERAPEUTIC DRUG MONITORING: ICD-10-CM

## 2018-08-08 DIAGNOSIS — M25.551 CHRONIC RIGHT HIP PAIN: Chronic | ICD-10-CM

## 2018-08-08 DIAGNOSIS — M51.26 HERNIATED LUMBAR INTERVERTEBRAL DISC: ICD-10-CM

## 2018-08-08 DIAGNOSIS — G89.29 CHRONIC RIGHT HIP PAIN: Chronic | ICD-10-CM

## 2018-08-08 DIAGNOSIS — E55.9 VITAMIN D DEFICIENCY: Chronic | ICD-10-CM

## 2018-08-08 DIAGNOSIS — M79.604 LOW BACK PAIN RADIATING TO RIGHT LOWER EXTREMITY: Primary | Chronic | ICD-10-CM

## 2018-08-08 DIAGNOSIS — M54.50 LOW BACK PAIN RADIATING TO RIGHT LOWER EXTREMITY: Primary | Chronic | ICD-10-CM

## 2018-08-08 PROCEDURE — 99214 OFFICE O/P EST MOD 30 MIN: CPT | Performed by: INTERNAL MEDICINE

## 2018-08-08 RX ORDER — PREDNISONE 10 MG/1
TABLET ORAL
Qty: 48 TABLET | Refills: 0 | Status: SHIPPED | OUTPATIENT
Start: 2018-08-08 | End: 2018-11-14

## 2018-08-08 NOTE — PROGRESS NOTES
08/08/2018    Patient Information  Rosemarie CHINCHILLA [address] [address]      1965  [phone]      Chief Complaint:     Follow-up lower back pain and right lumbar radiculopathy with recent MRI.    History of Present Illness:    Patient with a history of known cervical disc disease, history of colon polyps, environmental allergies/allergic rhinitis.  She presents today to follow-up on a recent MRI that we ordered for lower back pain and right sided lumbar radicular symptoms.  This will be described below.  She has no new acute complaints.  Her past medical history reviewed and updated where necessary including health maintenance parameters.  This reveals she is up-to-date or else accounted for.    The history regarding lower back pain with right lumbar radicular symptoms and new discovery of herniated disc in the lumbar spine:    08/08/2018--patient seen in follow-up and reports her symptoms are tolerable although she still has right-sided intermittent lumbar radicular symptoms.  Her back in itself is not particularly bothersome.  It is not particularly interfering with her daily activities.  We discussed options including referral back to neurosurgery versus consideration of physical therapy and perhaps some oral prednisone or even epidural injections.  Given the fact that her symptoms are not severe at present time, I think we will try conservative therapy with medication and physical therapy.  If her symptoms should worsen then certainly would always have the option to refer back to the surgeon.    08/04/2018--MRI of the lumbar spine reveals that T12-L1, L1-L2, L2-L3, show no significant canal or foraminal narrowing.  At L3-L4 there is mild canal and bilateral foraminal narrowing.  At L4-L5 there is a right paracentral posterior lateral disc herniation that mildly to moderately narrows the  right side of the thecal sign, narrows the right lateral recess, presses on the anterior medial aspect of the traversing right L5 nerve root and there is mild spurring into the foramina.  There is minimal left and there is at least mild to up to mild to moderate right foraminal narrowing.  At L5-S1 there is no significant canal or lateral recess or foraminal narrowing.    07/24/2018--patient seen in follow-up and reports her lower back pain is now worse and radiates into the right lower extremity into the hip, lateral right thigh and right lateral calf region.  She started going to a chiropractor which may have helped during the first session.  She is ready proceed with further evaluation and therefore I will order an MRI.  I think this is appropriate given the fact the patient has failed conservative therapy.    06/07/2018--patient seen in follow-up and reports her hip pain is much better after starting prednisone.  She feels like symptoms are good enough that we can not proceed with any further evaluation or treatment at this time.    05/31/2018--x-ray of the right hip revealed no acute or chronic osseous or soft tissue abnormality.  X-ray of the lumbar spine reveals degenerative disc changes at L3-L4, L4-L5, and L5-S1.  Minor endplate osteophytic changes are noted at multiple levels.  No acute abnormality seen.  Diffuse facet arthritic changes at L4-L5 and L5-S1.  Recommend MRI lumbar spine if clinically indicated.  Patient notified and started on prednisone 50 mg by mouth daily ×5 days, taper and discontinue.    05/30/2018--patient presents with approximately two-week history of right hip pain which seems to be located laterally and also radiates in towards the inguinal ligament region.  No known trauma.  The pain is worse with increasing weightbearing and also with sleeping on her right side.  No problems with sitting but the pain is definitely aggravated by standing up.  The pain seems to ease after she takes  several steps  but does not totally resolve when she is standing.  There is a background of a dull ache.  No numbness or paresthesias.  Occasionally has right-sided lower back pain.  Examination reveals good rotation and range of motion of the right hip and there is no definite tenderness to percussion or palpation over the trochanteric bursal area.  Possibilities include primary hip pathology as well as lumbar radiculopathy.  Plan is as follows: X-ray of the left hip and pelvis.  X-ray of the lumbar spine.  Patient will follow-up on the phone for the results either later today or tomorrow and possible further instructions.      Review of Systems   Constitution: Negative.   HENT: Negative.    Eyes: Negative.    Cardiovascular: Negative.    Respiratory: Negative.    Endocrine: Negative.    Hematologic/Lymphatic: Negative.    Skin: Negative.    Musculoskeletal: Positive for back pain.   Gastrointestinal: Negative.    Genitourinary: Negative.    Neurological: Negative.    Psychiatric/Behavioral: Negative.    Allergic/Immunologic: Negative.        Active Problems:    Patient Active Problem List   Diagnosis   • Degeneration of intervertebral disc of mid-cervical region   • Hallux valgus of left foot   • Hyperlipidemia   • Non morbid obesity   • Vitamin D deficiency   • Therapeutic drug monitoring   • Routine physical examination   • History of colon polyps, 11/22/2016-- adenoma ×2. 06/21/2016--tubular adenoma ×2.  Multiple hyperplastic.  Greater than 50 mm hyperplastic polyp at the ileocecal valve.  Rep   • Diverticulosis of colon   • Multiple environmental allergies   • Allergic rhinitis   • Chronic right hip pain   • Low back pain radiating to right lower extremity   • Herniated lumbar intervertebral disc         Past Medical History:   Diagnosis Date   • Abnormal weight gain 2/16/2017 05/01/2017--patient seen in follow-up and reports she stopped the phentermine and topiramate due to side effects,  specifically constipation.  Current weight is 232 pounds which is the same as her last visit. She discontinued these medications and her symptoms resolved.  She restarted the medications and the symptoms returned.   03/14/2017--patient seen in follow-up and has lost 5 pounds.  She seems to be tolerating the medication well but does have a dry mouth.  I see no reason why we cannot continue the current regimen.  I will have her follow-up in about 5-6 weeks to reassess.  Current weight is 232 pounds.  02/16/2017--patient presents with a several year history of weight gain despite multiple attempts at various diet and exercise regimens.  She has failed low carbohydrate diet.  He has not been to Weight Watchers which in my opinion is a farce.  She is asking about Saxenda which I think could be very helpful but I explained to her that it's extremely expensive and her insurance is likely not pay for   • Allergic rhinitis 11/2/2016    Patient has never had allergy testing.   • Degeneration of intervertebral disc of mid-cervical region 12/23/2015 12/23/2015--MRI cervical spine reveals at C4-C5 there is mild spinal stenosis. There is moderate to severe bilateral foraminal narrowing. At C5-C6 there is mild left but no right foraminal narrowing. At C6-C7 there are surgical changes with hardware and this results in artifact. Evaluation for solid bony fusion cannot be done because of this. There is only minimal if any canal narrowing at C6-C7. There is mild right and mild to moderate left bony foraminal narrowing. C7-T1 reveals minimal right foraminal narrowing.    • Disc degeneration, lumbar 5/31/2018 06/07/2018--patient seen in follow-up and reports her hip pain is much better after starting prednisone.  She feels like symptoms are good enough that we can not proceed with any further evaluation or treatment at this time.  05/31/2018--x-ray of the right hip revealed no acute or chronic osseous or soft tissue abnormality.   X-ray of the lumbar spine reveals degenerative disc changes at L3-L4, L4-L5, and L5-S1.  Minor endplate osteophytic changes are noted at multiple levels.  No acute abnormality seen.  Diffuse facet arthritic changes at L4-L5 and L5-S1.  Recommend MRI lumbar spine if clinically indicated.  Patient notified and started on prednisone 50 mg by mouth daily ×5 days, taper and discontinue.  05/30/2018--patient presents with approximately two-week history of right hip pain which seems to be located laterally and also radiates in towards the inguinal ligament region.  No known trauma.  The pain is worse with increasing weightbearing and also with sleeping on her right side.  No problems with si   • Diverticulosis of colon 6/21/2016 11/22/2016--colonoscopy revealed a polyp at the ileocecal valve and sigmoid colon.  There were 4 polyps at the rectosigmoid colon.  The single diverticulum in the sigmoid colon was not commented upon.  Pathology returned a sessile serrated adenoma at the ileocecal valve and the sigmoid colon.  The rectosigmoid polyp was hyperplastic.  06/21/2016--colonoscopy revealed a greater than 50 mm polyp at the ileocecal valve.  Pathology returned hyperplastic polyp.  A multilobulated 5 mm polyp was found in the transverse colon.  Pathology returned tubular adenoma with low grade dysplasia.  3 multilobulated polyps were found in the sigmoid colon.  Pathology revealed fragments of hyperplastic polyps.  2 multilobulated polyps were found in the rectosigmoid colon and pathology returned tubular adenoma with low-grade dysplasia.  A single small mouth diverticula and was found in the sigmoid.  Nonbleeding internal hemorrhoids.  General surgery recommended repeat colonoscopy in 3 months.   • Hallux valgus of left foot 4/4/2016 03/13/2015--patient was evaluated by the podiatrist and assessment was ingrown great toenail and paronychia. Partial nail avulsion completed.   03/13/2015--patient presents with a  greater than one-year history of intermittent pain, swelling, redness, tenderness involving her left great toe. Examination reveals early hallux valgus deformity as well as a possible ingrown nail. Patient referred to podiatry. Uric acid and CRP obtained to rule out possibility of gout and both were normal.   • History of colon polyps, 11/22/2016-- adenoma ×2. 06/21/2016--tubular adenoma ×2.  Multiple hyperplastic.  Greater than 50 mm hyperplastic polyp at the ileocecal valve.  Rep 6/21/2016 06/01/2017--colonoscopy revealed hyperplastic ×2.  Perianal fistulotomy.  External hemorrhoidectomy of one column.  11/22/2016--colonoscopy revealed a polyp at the ileocecal valve and sigmoid colon.  There were 4 polyps at the rectosigmoid colon.  The single diverticulum in the sigmoid colon was not commented upon.  Pathology returned a sessile serrated adenoma at the ileocecal valve and the sigmoid colon.  The rectosigmoid polyp was hyperplastic.  06/21/2016--colonoscopy revealed a greater than 50 mm polyp at the ileocecal valve.  Pathology returned hyperplastic polyp.  A multilobulated 5 mm polyp was found in the transverse colon.  Pathology returned tubular adenoma with low grade dysplasia.  3 multilobulated polyps were found in the sigmoid colon.  Pathology revealed fragments of hyperplastic polyps.  2 multilobulated polyps were found in the rectosigmoid colon and pathology returned tubular adenoma with low-grade dysplasia.  A single small mouth diverticula and was found in the sigmoid.  Nonbleeding i   • History of Perirectal abscess 9/1/2016 09/12/2016--patient seen in follow-up for dressing change.  Patient is concerned about not being on an antibiotic and I explained to her that the definitive treatment is incision and drainage.  On exam the wound looks very good with no sign of purulent discharge.  She has a follow-up with general surgeon tomorrow.  09/02/2016--incision and drainage of left perirectal  abscess by general surgery.  Cultures returned moderate growth of Klebsiella pneumoniae and heavy growth of Streptococcus agalactiae.   09/01/2016--patient presents with approximately 4 month history of a painful and swollen subcutaneous mass left buttock that is draining foul-smelling purulent material.  Examination confirms a large subcutaneous mass/abscess that I suspect is an infected sebaceous cyst.  General surgery referral given.   • Hyperlipidemia 4/4/2016 03/18/2015--NMR reveals total cholesterol 216, triglycerides mildly elevated at 171. LDL particle number elevated at 1654. Small LDL particle number is normal at 446. HDL particle number normal at 33.4. Recommend diet and lifestyle changes.   • Multiple environmental allergies 11/2/2016    Patient has never had allergy testing.   • Non morbid obesity 4/4/2016 05/01/2017--patient seen in follow-up and reports she stopped the phentermine and topiramate due to side effects, specifically constipation.  Current weight is 232 pounds which is the same as her last visit.  She discontinued these medications and her symptoms resolved.  She restarted the medications and the symptoms returned.  03/14/2017--patient seen in follow-up and has lost 5 pounds.  She seems to be tolerating the medication well but does have a dry mouth.  I see no reason why we cannot continue the current regimen.  I will have her follow-up in about 5-6 weeks to reassess. Current weight is 232 pounds.  02/16/2017--patient presents with a several year history of weight gain despite multiple attempts at various diet and exercise regimens.  She has failed low carbohydrate diet.  He has not been to Weight Watchers which in my opinion is a farce.  She is asking about Saxenda which I think could be very helpful but I explained to her that it's extremely expensive and her insurance is likely not pay for i   • Vitamin D deficiency 4/4/2016         Past Surgical History:   Procedure Laterality  Date   • CERVICAL FUSION  07/23/2012 07/23/2012--anterior cervical spinal fusion, C6--C7 with instrumentation.   • COLONOSCOPY N/A 6/21/2016 06/21/2016--colonoscopy revealed a greater than 50 mm polyp at the ileocecal valve.  Pathology returned hyperplastic polyp.  A multilobulated 5 mm polyp was found in the transverse colon.  Pathology returned tubular adenoma with low grade dysplasia.  3 multilobulated polyps were found in the sigmoid colon.  Pathology revealed fragments of hyperplastic polyps.  2 multilobulated polyps were found in   • COLONOSCOPY N/A 11/22/2016 11/22/2016--colonoscopy revealed a polyp at the ileocecal valve and sigmoid colon.  There were 4 polyps at the rectosigmoid colon.  The single diverticulum in the sigmoid colon was not commented upon.  Pathology returned a sessile serrated adenoma at the ileocecal valve and the sigmoid colon.  The rectosigmoid polyp was hyperplastic.   • COLONOSCOPY W/ POLYPECTOMY  06/01/2017 06/01/2017--colonoscopy revealed hyperplastic ×2.  Perianal fistulotomy.  External hemorrhoidectomy of one column.   • ENDOMETRIAL ABLATION  2006 2006--endometrial ablation ×2 for menorrhagia/menorrhea.   • INCISION AND DRAINAGE PERIRECTAL ABSCESS N/A 9/2/2016 09/02/2016--incision and drainage of perirectal abscess.   • PARTIAL HYSTERECTOMY  10/2008    October 2008--partial hysterectomy for menorrhagia/menorrhea. Previously had endometrial ablation procedures 2. Pt states uterus was removed.   • RECTAL EXAMINATION UNDER ANESTHESIA N/A 6/1/2017 06/01/2017--colonoscopy revealed hyperplastic ×2.  Perianal fistulotomy.  External hemorrhoidectomy of one column.   • RECTAL FISTULOTOMY N/A 9/29/2017 09/29/2017--rectal fistulotomy.  Rectal exam under anesthesia.   • WRIST ARTHROPLASTY Right 07/15/2008    07/15/2008--right radial ulnar distal joint arthroplasty performed for non-traumatic arthritis/impingement syndrome.         No Known Allergies        Current  Outpatient Prescriptions:   •  B Complex Vitamins (VITAMIN B COMPLEX PO), Take 1 capsule by mouth Daily., Disp: , Rfl:   •  cetirizine (zyrTEC) 10 MG tablet, Take 10 mg by mouth Daily., Disp: , Rfl:   •  Cholecalciferol (VITAMIN D3) 5000 UNITS tablet, Take 1 tablet by mouth Daily., Disp: , Rfl:   •  gabapentin (NEURONTIN) 600 MG tablet, TAKE ONE TABLET BY MOUTH TWO TIMES DAILY, Disp: 60 tablet, Rfl: 1  •  Glucosamine HCl 1000 MG tablet, Take 1 tablet by mouth 2 (Two) Times a Day., Disp: , Rfl:   •  ibuprofen (ADVIL,MOTRIN) 200 MG tablet, Take 200 mg by mouth every 6 (six) hours as needed for mild pain (1-3)., Disp: , Rfl:   •  pravastatin (PRAVACHOL) 40 MG tablet, 1 by mouth daily for high cholesterol, Disp: 30 tablet, Rfl: 6  •  senna-docusate (PERICOLACE) 8.6-50 MG per tablet, Take 2 tablets by mouth Daily As Needed for Constipation., Disp: 30 tablet, Rfl: 1      Family History   Problem Relation Age of Onset   • Diabetes Mother         Type 2   • Diabetes Maternal Grandmother         Type 2   • Malig Hyperthermia Neg Hx    • Breast cancer Neg Hx    • Ovarian cancer Neg Hx    • Colon cancer Neg Hx    • Deep vein thrombosis Neg Hx    • Pulmonary embolism Neg Hx          Social History     Social History   • Marital status:      Spouse name: N/A   • Number of children: 2   • Years of education: 12     Occupational History   •       Social History Main Topics   • Smoking status: Current Every Day Smoker     Packs/day: 0.50     Years: 20.00     Types: Cigarettes   • Smokeless tobacco: Never Used   • Alcohol use Yes      Comment: Occasional   • Drug use: No   • Sexual activity: Yes     Partners: Male     Birth control/ protection: Surgical      Comment: RIVERA [place] age 2007     Other Topics Concern   • Not on file     Social History Narrative   • No narrative on file         Vitals:    08/08/18 1510   BP: 122/68   Pulse: 60   Resp: 20   SpO2: 100%   Weight: 109 kg (241 lb)   Height:  "177.8 cm (70\")          Physical Exam:    General: Alert and oriented x 3.  No acute distress.  Normal affect.  HEENT: Pupils equal, round, reactive to light; extraocular movements intact; sclerae nonicteric; pharynx, ear canals and TMs normal.  Neck: Without JVD, thyromegaly, bruit, or adenopathy.  Lungs: Clear to auscultation in all fields.  Heart: Regular rate and rhythm without murmur, rub, gallop, or click.  Abdomen: Soft, nontender, without hepatosplenomegaly or hernia.  Bowel sounds normal.  : Deferred.  Rectal: Deferred.  Extremities: Without clubbing, cyanosis, edema, or pulse deficit.  Neurologic: Intact without focal deficit.  Normal station and gait observed during ingress and egress from the examination room.  Skin: Without significant lesion.  Musculoskeletal: Unremarkable.      Lab/other results:    I reviewed the results of the MRI of the lumbar spine with the patient.    Assessment/Plan:     Diagnosis Plan   1. Low back pain radiating to right lower extremity     2. Chronic right hip pain     3. Herniated lumbar intervertebral disc       Patient with approximately 6 month history of low back pain with radiation into the right hip is related to a herniated L4-L5 disc.  We have opted for conservative treatment at this time.  I think that is certainly the reasonable thing to do given the fact that she is not debilitated at this time.  We can always change our mind later.    Plan is as follows: Prednisone 50 mg by mouth daily ×5 days, taper and discontinue.  Hopefully this will alleviate some of the pressure and irritation of the nerve and patient was subsequently/concurrently undergo physical therapy and back strengthening exercises.  She also inquired about an inversion table which she has access to see no reason why she could not give that a try.  If her symptoms should worsen or persist then she should contact me and I will refer her back to her neurosurgeon.        Procedures        "

## 2018-08-10 ENCOUNTER — TREATMENT (OUTPATIENT)
Dept: PHYSICAL THERAPY | Facility: CLINIC | Age: 53
End: 2018-08-10

## 2018-08-10 DIAGNOSIS — M54.50 LOW BACK PAIN RADIATING TO RIGHT LOWER EXTREMITY: Primary | ICD-10-CM

## 2018-08-10 DIAGNOSIS — M79.604 LOW BACK PAIN RADIATING TO RIGHT LOWER EXTREMITY: Primary | ICD-10-CM

## 2018-08-10 PROCEDURE — 97110 THERAPEUTIC EXERCISES: CPT | Performed by: PHYSICAL THERAPIST

## 2018-08-10 PROCEDURE — 97161 PT EVAL LOW COMPLEX 20 MIN: CPT | Performed by: PHYSICAL THERAPIST

## 2018-08-10 PROCEDURE — 97140 MANUAL THERAPY 1/> REGIONS: CPT | Performed by: PHYSICAL THERAPIST

## 2018-08-10 NOTE — PROGRESS NOTES
Physical Therapy Initial Evaluation and Plan of Care      Patient: Rosemarie Gandhi   : 1965  Diagnosis/ICD-10 Code:  Low back pain radiating to right lower extremity [M54.5]  Referring practitioner: Brock Alberto MD    Subjective Evaluation    History of Present Illness  Mechanism of injury: Onset of R hip pain May 2018. Back in winter going down steps and landed 2 steps on buttock. Pulling weeds 2018. Felt a snap. COuldn't move.  Saw DC 6x which helped back but not leg.  PMH ACDF C5-7 (Gutierrez) . R wrist replacement   ALso pain R knee sharp with sit to stand. Don't want to staighten it.  I am babying it. More pain.      Patient Occupation: Administrative Pain  Current pain ratin  At worst pain ratin  Quality: radiating, dull ache and discomfort  Relieving factors: medications (dosepack. Just started))  Aggravating factors: sleeping (varies, rolling over in bed)  Progression: no change    Social Support  Lives in: multiple-level home (to basement)  Lives with: significant other    Diagnostic Tests  MRI studies: abnormal    Treatments  Previous treatment: chiropractic and medication           Objective       Active Range of Motion     Lumbar   Flexion: WFL  Extension: WFL  Left lateral flexion: WFL  Right lateral flexion: WFL  Left rotation: WFL  Right rotation: WFL    Strength/Myotome Testing     Left Hip   Planes of Motion   Flexion: 5  Extension: 5  Abduction: 5  Adduction: 5  External rotation: 4+  Internal rotation: 5    Right Hip   Planes of Motion   Flexion: 4+  Extension: 5  Abduction: 5  Adduction: 5  External rotation: 4+  Internal rotation: 5    Left Knee   Flexion: 5  Extension: 5    Right Knee   Flexion: 5  Extension: 5    Left Ankle/Foot   Dorsiflexion: 5    Right Ankle/Foot   Dorsiflexion: 5    Additional Strength Details  Decreased hip Anterior glide  R AINN    Tests     Lumbar     Left   Positive crossed SLR.   Negative femoral stretch and passive SLR.     Right    Positive quadrant.   Negative crossed SLR, femoral stretch and passive SLR.          Assessment & Plan     Assessment  Impairments: abnormal or restricted ROM, impaired physical strength, lacks appropriate home exercise program and pain with function  Assessment details: Pt is a good candidate for skilled PT intervention, leann manual therapy for spinal joint mobility, alignment, postural restoration and core strengthening to restore functional AROM and strength to return to previous level of ADL's.  Prognosis: good  Prognosis details: Short Term Goals: ( 2 weeks)  1.Pt to be independent with HEP  2. Pt to exhibit improved lumbar extension to 100% witout pain to allow for increased ease with ADL's  3. Pt to demonstrate proper lifting technique  4. Pt to improve lower ab strength to 4/5 to allow for improved standing/stairclimbing tolerance    Long Term Goals (6 weeks )  1. Pt to demonstrate ability to lift safely without LBP  2. Pt to exhibit 4+/5 lower abdominal strength to allow for more strenuous daily activities  3. Pt to exhibit (-) quadrant and no LE symptoms with standing /sitting > 60 minutes  4. Pt to score >65/80 on LEFS  Functional Limitations: uncomfortable because of pain, sitting, standing and unable to perform repetitive tasks      Manual Therapy:    10     mins  48079;  Therapeutic Exercise:    10     mins  94585;     Neuromuscular Ivania:        mins  02468;    Therapeutic Activity:          mins  83921;     Gait Training:           mins  90919;     Ultrasound:          mins  95727;    Electrical Stimulation:         mins  49119 ( );  Dry Needling          mins self-pay    Timed Treatment:   20   mins   Total Treatment:     50   mins    PT SIGNATURE: LAWRENCE Ewing License # [id number]  DATE TREATMENT INITIATED: 8/13/2018    Initial Certification  Certification Period: 11/11/2018  I certify that the therapy services are furnished while this patient is under my care.  The services  outlined above are required by this patient, and will be reviewed every 90 days.     PHYSICIAN: Brock Alberto MD      DATE:     Please sign and return via fax to [phone].. Thank you, [facility].

## 2018-08-13 RX ORDER — GABAPENTIN 600 MG/1
TABLET ORAL
Qty: 60 TABLET | Refills: 2 | OUTPATIENT
Start: 2018-08-13 | End: 2018-10-30 | Stop reason: SDUPTHER

## 2018-08-13 NOTE — PATIENT INSTRUCTIONS
Pt was educated on the importance of their HEP and her current need for skilled physical therapy. Patients goals and potential limitations were discussed and pt is in agreement with current plan of care and treatment emphasis.

## 2018-08-14 ENCOUNTER — TREATMENT (OUTPATIENT)
Dept: PHYSICAL THERAPY | Facility: CLINIC | Age: 53
End: 2018-08-14

## 2018-08-14 DIAGNOSIS — M54.50 LOW BACK PAIN RADIATING TO RIGHT LOWER EXTREMITY: Primary | ICD-10-CM

## 2018-08-14 DIAGNOSIS — M79.604 LOW BACK PAIN RADIATING TO RIGHT LOWER EXTREMITY: Primary | ICD-10-CM

## 2018-08-14 PROCEDURE — 97140 MANUAL THERAPY 1/> REGIONS: CPT | Performed by: PHYSICAL THERAPIST

## 2018-08-14 PROCEDURE — 97112 NEUROMUSCULAR REEDUCATION: CPT | Performed by: PHYSICAL THERAPIST

## 2018-08-14 NOTE — PROGRESS NOTES
Physical Therapy Daily Progress Note        Subjective     Rosemarie Gandhi reports: Doing OK. Pain seems to be more localized at R lateral hip/pelvis and not so much down the leg. Hurts to roll over on R and sleep on R side. Leaving this week for [place] for vacation     Objective   See Exercise, Manual, and Modality Logs for complete treatment.       Assessment/Plan  Less pain with palpation and R S/L after US and manual treatment. Added SLS with Tband row. More difficult on the R     Progress strengthening /stabilization /functional activity           Manual Therapy:  12       mins  54979;  Therapeutic Exercise: 6     mins  88493;     Neuromuscular Ivania:10       mins  79535;    Therapeutic Activity:         mins  47193;     Gait Training:         mins  94656;     Ultrasound:   9      mins  08116;    Electrical Stimulation:        mins  72445 ( );  Dry Needling         mins self-pay    Timed Treatment:   37   mins   Total Treatment:     40   mins    Soraya Lester, PT  Physical Therapist  [place] License # [id number]

## 2018-09-04 ENCOUNTER — TREATMENT (OUTPATIENT)
Dept: PHYSICAL THERAPY | Facility: CLINIC | Age: 53
End: 2018-09-04

## 2018-09-04 DIAGNOSIS — M54.50 LOW BACK PAIN RADIATING TO RIGHT LOWER EXTREMITY: Primary | ICD-10-CM

## 2018-09-04 DIAGNOSIS — M79.604 LOW BACK PAIN RADIATING TO RIGHT LOWER EXTREMITY: Primary | ICD-10-CM

## 2018-09-04 PROCEDURE — 97140 MANUAL THERAPY 1/> REGIONS: CPT | Performed by: PHYSICAL THERAPIST

## 2018-09-04 PROCEDURE — 97035 APP MDLTY 1+ULTRASOUND EA 15: CPT | Performed by: PHYSICAL THERAPIST

## 2018-09-04 PROCEDURE — 97110 THERAPEUTIC EXERCISES: CPT | Performed by: PHYSICAL THERAPIST

## 2018-09-04 NOTE — PROGRESS NOTES
Physical Therapy Daily Progress Note    Visit # : 3  Rosemarie Gandhi reports: pt reports that her back is feeling better but is still having soreness along the front of my hip.   Pain is a constant 3/10 that occasionally increased to 8/10.  Pt denies radicular complaints or LBP.    Subjective     Objective       Palpation     Right   No palpable tenderness to the gluteus medius. Tenderness of the TFL.     Strength/Myotome Testing     Right Hip   Planes of Motion   Abduction: 5    Additional Strength Details  Non painful     See Exercise, Manual, and Modality Logs for complete treatment.   Level ASIS    Assessment/Plan  Palp tenderness noted along R TFL.  Pt exhibits normal iliosacral alignment.  Consider hip abd adina with belt next visit if tolerated.    Progress strengthening /stabilization /functional activity           Manual Therapy:    8     mins  86219;  Therapeutic Exercise:    19     mins  61192;     Neuromuscular Ivania:    4    mins  06966;    Therapeutic Activity:     -     mins  53574;     Gait Training:      -     mins  05083;     Ultrasound:     9     mins  73308;    Electrical Stimulation:    -     mins  25879 ( );  Iontophoresis                 -     mins 05189      Timed Treatment:   40   mins   Total Treatment:     40   mins        Marly Kimble PT  Physical Therapist  [place] License # [id number]

## 2018-09-04 NOTE — PATIENT INSTRUCTIONS
Access Code: [email or web address]   URL: [email or web address]   Date: 09/04/2018   Prepared by: Cornia Kimble     Exercises   Bent Knee Fallouts - 15 reps - 1 sets - 1x daily   Supine Figure 4 Piriformis Stretch - 3 reps - 1 sets - 20 hold - 1x daily

## 2018-09-06 ENCOUNTER — TREATMENT (OUTPATIENT)
Dept: PHYSICAL THERAPY | Facility: CLINIC | Age: 53
End: 2018-09-06

## 2018-09-06 DIAGNOSIS — M54.50 LOW BACK PAIN RADIATING TO RIGHT LOWER EXTREMITY: Primary | ICD-10-CM

## 2018-09-06 DIAGNOSIS — M79.604 LOW BACK PAIN RADIATING TO RIGHT LOWER EXTREMITY: Primary | ICD-10-CM

## 2018-09-06 PROCEDURE — 97035 APP MDLTY 1+ULTRASOUND EA 15: CPT | Performed by: PHYSICAL THERAPIST

## 2018-09-06 PROCEDURE — 97140 MANUAL THERAPY 1/> REGIONS: CPT | Performed by: PHYSICAL THERAPIST

## 2018-09-06 NOTE — PROGRESS NOTES
Physical Therapy DIscharge Note  Visit # 4    Subjective     Rosemarie Gandhi reports: STill sore R lateral hip with turning or getting up from prolonged sitting. It is much better than previously and I would like to just continue with (I) HEP. No LBP    Objective   Lumbar AROM 100% without pain  Hip flexion and abduction 5/5  Hip ER/IR 5/5  SLR (-) B  TIMI (-)    See Exercise, Manual, and Modality Logs for complete treatment.       Assessment/Plan  Short Term Goals: ( 2 weeks) MET  1.Pt to be independent with HEP  2. Pt to exhibit improved lumbar extension to 100% witout pain to allow for increased ease with ADL's  3. Pt to demonstrate proper lifting technique  4. Pt to improve lower ab strength to 4/5 to allow for improved standing/stairclimbing tolerance    Long Term Goals (6 weeks )   1. Pt to demonstrate ability to lift safely without LBP MET  2. Pt to exhibit 4+/5 lower abdominal strength to allow for more strenuous daily activities NOT MET  3. Pt to exhibit (-) quadrant and no LE symptoms with standing /sitting > 60 minutes  MET  4. Pt to score >65/80 on LEFS NT    DC PT           Manual Therapy:  20      mins  23243;  Therapeutic Exercise: 5      mins  13927;     Neuromuscular Ivania:       mins  57827;    Therapeutic Activity:         mins  39651;     Gait Training:         mins  79098;     Ultrasound:   8      mins  69929;    Electrical Stimulation:        mins  24964 ( );  Dry Needling         mins self-pay    Timed Treatment:   33   mins   Total Treatment:     40   mins    Soraya Lester, PT  Physical Therapist  [place] License # [id number]

## 2018-10-09 ENCOUNTER — PREP FOR SURGERY (OUTPATIENT)
Dept: OTHER | Facility: HOSPITAL | Age: 53
End: 2018-10-09

## 2018-10-09 DIAGNOSIS — Z86.010 HISTORY OF COLON POLYPS: Primary | ICD-10-CM

## 2018-10-31 RX ORDER — GABAPENTIN 600 MG/1
TABLET ORAL
Qty: 60 TABLET | Refills: 1 | OUTPATIENT
Start: 2018-10-31 | End: 2019-08-21 | Stop reason: SDUPTHER

## 2018-11-15 ENCOUNTER — ANESTHESIA (OUTPATIENT)
Dept: GASTROENTEROLOGY | Facility: HOSPITAL | Age: 53
End: 2018-11-15

## 2018-11-15 ENCOUNTER — HOSPITAL ENCOUNTER (OUTPATIENT)
Facility: HOSPITAL | Age: 53
Setting detail: HOSPITAL OUTPATIENT SURGERY
Discharge: HOME | End: 2018-11-15
Attending: SURGERY | Admitting: SURGERY

## 2018-11-15 ENCOUNTER — ANESTHESIA EVENT (OUTPATIENT)
Dept: GASTROENTEROLOGY | Facility: HOSPITAL | Age: 53
End: 2018-11-15

## 2018-11-15 VITALS
RESPIRATION RATE: 15 BRPM | SYSTOLIC BLOOD PRESSURE: 120 MMHG | DIASTOLIC BLOOD PRESSURE: 78 MMHG | OXYGEN SATURATION: 98 % | WEIGHT: 238.2 LBS | TEMPERATURE: 97.8 F | BODY MASS INDEX: 34.1 KG/M2 | HEART RATE: 74 BPM | HEIGHT: 70 IN

## 2018-11-15 DIAGNOSIS — Z86.010 HISTORY OF COLON POLYPS: ICD-10-CM

## 2018-11-15 PROCEDURE — 45385 COLONOSCOPY W/LESION REMOVAL: CPT | Performed by: SURGERY

## 2018-11-15 PROCEDURE — S0260 H&P FOR SURGERY: HCPCS | Performed by: SURGERY

## 2018-11-15 PROCEDURE — 88305 TISSUE EXAM BY PATHOLOGIST: CPT | Performed by: SURGERY

## 2018-11-15 PROCEDURE — 45380 COLONOSCOPY AND BIOPSY: CPT | Performed by: SURGERY

## 2018-11-15 PROCEDURE — 25010000002 PROPOFOL 10 MG/ML EMULSION: Performed by: ANESTHESIOLOGY

## 2018-11-15 DEVICE — CLIPPING DEVICE
Type: IMPLANTABLE DEVICE | Site: CECUM | Status: FUNCTIONAL
Brand: RESOLUTION CLIP

## 2018-11-15 RX ORDER — PROPOFOL 10 MG/ML
VIAL (ML) INTRAVENOUS AS NEEDED
Status: DISCONTINUED | OUTPATIENT
Start: 2018-11-15 | End: 2018-11-15 | Stop reason: SURG

## 2018-11-15 RX ORDER — LIDOCAINE HYDROCHLORIDE 20 MG/ML
INJECTION, SOLUTION INFILTRATION; PERINEURAL AS NEEDED
Status: DISCONTINUED | OUTPATIENT
Start: 2018-11-15 | End: 2018-11-15 | Stop reason: SURG

## 2018-11-15 RX ORDER — PROPOFOL 10 MG/ML
VIAL (ML) INTRAVENOUS CONTINUOUS PRN
Status: DISCONTINUED | OUTPATIENT
Start: 2018-11-15 | End: 2018-11-15 | Stop reason: SURG

## 2018-11-15 RX ORDER — SODIUM CHLORIDE 0.9 % (FLUSH) 0.9 %
3 SYRINGE (ML) INJECTION AS NEEDED
Status: DISCONTINUED | OUTPATIENT
Start: 2018-11-15 | End: 2018-11-15 | Stop reason: HOSPADM

## 2018-11-15 RX ORDER — LIDOCAINE HYDROCHLORIDE 10 MG/ML
0.5 INJECTION, SOLUTION INFILTRATION; PERINEURAL ONCE AS NEEDED
Status: DISCONTINUED | OUTPATIENT
Start: 2018-11-15 | End: 2018-11-15 | Stop reason: HOSPADM

## 2018-11-15 RX ORDER — SODIUM CHLORIDE, SODIUM LACTATE, POTASSIUM CHLORIDE, CALCIUM CHLORIDE 600; 310; 30; 20 MG/100ML; MG/100ML; MG/100ML; MG/100ML
1000 INJECTION, SOLUTION INTRAVENOUS CONTINUOUS
Status: DISCONTINUED | OUTPATIENT
Start: 2018-11-15 | End: 2018-11-15 | Stop reason: HOSPADM

## 2018-11-15 RX ADMIN — PROPOFOL 210 MG: 10 INJECTION, EMULSION INTRAVENOUS at 08:50

## 2018-11-15 RX ADMIN — SODIUM CHLORIDE, POTASSIUM CHLORIDE, SODIUM LACTATE AND CALCIUM CHLORIDE: 600; 310; 30; 20 INJECTION, SOLUTION INTRAVENOUS at 08:47

## 2018-11-15 RX ADMIN — SODIUM CHLORIDE, POTASSIUM CHLORIDE, SODIUM LACTATE AND CALCIUM CHLORIDE 1000 ML: 600; 310; 30; 20 INJECTION, SOLUTION INTRAVENOUS at 08:19

## 2018-11-15 RX ADMIN — PROPOFOL 120 MCG/KG/MIN: 10 INJECTION, EMULSION INTRAVENOUS at 08:53

## 2018-11-15 RX ADMIN — LIDOCAINE HYDROCHLORIDE 50 MG: 20 INJECTION, SOLUTION INFILTRATION; PERINEURAL at 08:51

## 2018-11-15 NOTE — ANESTHESIA POSTPROCEDURE EVALUATION
Patient: Rosemarie Gandhi    Procedure Summary     Date:  11/15/18 Room / Location:   [facility]    Anesthesia Start:  0847 Anesthesia Stop:  0932    Procedure:  COLONOSCOPY TO CECUM WITH POLYPECTOMIES COLD BIOPSY AND RESOLUTION CLIP X 3, HOT BIOPSY (N/A ) Diagnosis:       History of colon polyps      (History of colon polyps [Z86.010])    Surgeon:  Ej Sanford MD Provider:  Alma Tavares MD    Anesthesia Type:  MAC ASA Status:  2          Anesthesia Type: MAC  Last vitals  BP   120/78 (11/15/18 0958)   Temp   36.6 °C (97.8 °F) (11/15/18 0810)   Pulse   74 (11/15/18 0958)   Resp   15 (11/15/18 0958)     SpO2   98 % (11/15/18 0958)     Post Anesthesia Care and Evaluation    Patient location during evaluation: PACU  Patient participation: complete - patient participated  Level of consciousness: awake and alert  Pain management: adequate  Airway patency: patent  Anesthetic complications: No anesthetic complications  PONV Status: none  Cardiovascular status: acceptable  Respiratory status: acceptable  Hydration status: acceptable

## 2018-11-15 NOTE — ANESTHESIA PREPROCEDURE EVALUATION
Anesthesia Evaluation     Patient summary reviewed and Nursing notes reviewed   no history of anesthetic complications:  NPO Solid Status: > 8 hours  NPO Liquid Status: > 8 hours           Airway   Mallampati: II  TM distance: >3 FB  Neck ROM: full  Dental    (+) upper dentures    Pulmonary - normal exam    breath sounds clear to auscultation  (+) a smoker Current Smoked day of surgery,   Cardiovascular - normal exam    Rhythm: regular  Rate: normal    (+) hyperlipidemia,       Neuro/Psych  GI/Hepatic/Renal/Endo    (+) obesity,     (-) morbid obesity    Musculoskeletal     Abdominal   (+) obese,    Substance History - negative use     OB/GYN negative ob/gyn ROS         Other   (+) arthritis                     Anesthesia Plan    ASA 2     MAC     intravenous induction   Anesthetic plan, all risks, benefits, and alternatives have been provided, discussed and informed consent has been obtained with: patient.

## 2018-11-15 NOTE — H&P
Reason for Visit: Surveillance colonoscopy    HPI:  Patient presents for evaluation for colon cancer surveillance.  There is no family history of colon neoplasia. No family hx of gastric, ovarian, or uterine cancer.  Patient denies changes in bowel habits, diarrhea, constipation, abdominal pain, decreased caliber of stool, melena, brbpr and weight loss.  There is no personal or family history of bleeding disorder or untoward reaction to anesthesia.  Patient underwent multiple colonoscopies in the past. She had a large ileocecal valve polyp that was removed piecemeal twice and path was consistent with hyperplastic polyp. She had other TA      Past Medical History:   Diagnosis Date   • Abnormal weight gain 2/16/2017 05/01/2017--patient seen in follow-up and reports she stopped the phentermine and topiramate due to side effects, specifically constipation.  Current weight is 232 pounds which is the same as her last visit. She discontinued these medications and her symptoms resolved.  She restarted the medications and the symptoms returned.   03/14/2017--patient seen in follow-up and has lost 5 pounds.  She seems to be tolerating the medication well but does have a dry mouth.  I see no reason why we cannot continue the current regimen.  I will have her follow-up in about 5-6 weeks to reassess.  Current weight is 232 pounds.  02/16/2017--patient presents with a several year history of weight gain despite multiple attempts at various diet and exercise regimens.  She has failed low carbohydrate diet.  He has not been to Weight Watchers which in my opinion is a farce.  She is asking about Saxenda which I think could be very helpful but I explained to her that it's extremely expensive and her insurance is likely not pay for   • Allergic rhinitis 11/2/2016    Patient has never had allergy testing.   • Degeneration of intervertebral disc of mid-cervical region 12/23/2015 12/23/2015--MRI cervical spine reveals at C4-C5  there is mild spinal stenosis. There is moderate to severe bilateral foraminal narrowing. At C5-C6 there is mild left but no right foraminal narrowing. At C6-C7 there are surgical changes with hardware and this results in artifact. Evaluation for solid bony fusion cannot be done because of this. There is only minimal if any canal narrowing at C6-C7. There is mild right and mild to moderate left bony foraminal narrowing. C7-T1 reveals minimal right foraminal narrowing.    • Disc degeneration, lumbar 5/31/2018 06/07/2018--patient seen in follow-up and reports her hip pain is much better after starting prednisone.  She feels like symptoms are good enough that we can not proceed with any further evaluation or treatment at this time.  05/31/2018--x-ray of the right hip revealed no acute or chronic osseous or soft tissue abnormality.  X-ray of the lumbar spine reveals degenerative disc changes at L3-L4, L4-L5, and L5-S1.  Minor endplate osteophytic changes are noted at multiple levels.  No acute abnormality seen.  Diffuse facet arthritic changes at L4-L5 and L5-S1.  Recommend MRI lumbar spine if clinically indicated.  Patient notified and started on prednisone 50 mg by mouth daily ×5 days, taper and discontinue.  05/30/2018--patient presents with approximately two-week history of right hip pain which seems to be located laterally and also radiates in towards the inguinal ligament region.  No known trauma.  The pain is worse with increasing weightbearing and also with sleeping on her right side.  No problems with si   • Diverticulosis of colon 6/21/2016 11/22/2016--colonoscopy revealed a polyp at the ileocecal valve and sigmoid colon.  There were 4 polyps at the rectosigmoid colon.  The single diverticulum in the sigmoid colon was not commented upon.  Pathology returned a sessile serrated adenoma at the ileocecal valve and the sigmoid colon.  The rectosigmoid polyp was hyperplastic.  06/21/2016--colonoscopy revealed  a greater than 50 mm polyp at the ileocecal valve.  Pathology returned hyperplastic polyp.  A multilobulated 5 mm polyp was found in the transverse colon.  Pathology returned tubular adenoma with low grade dysplasia.  3 multilobulated polyps were found in the sigmoid colon.  Pathology revealed fragments of hyperplastic polyps.  2 multilobulated polyps were found in the rectosigmoid colon and pathology returned tubular adenoma with low-grade dysplasia.  A single small mouth diverticula and was found in the sigmoid.  Nonbleeding internal hemorrhoids.  General surgery recommended repeat colonoscopy in 3 months.   • Hallux valgus of left foot 4/4/2016 03/13/2015--patient was evaluated by the podiatrist and assessment was ingrown great toenail and paronychia. Partial nail avulsion completed.   03/13/2015--patient presents with a greater than one-year history of intermittent pain, swelling, redness, tenderness involving her left great toe. Examination reveals early hallux valgus deformity as well as a possible ingrown nail. Patient referred to podiatry. Uric acid and CRP obtained to rule out possibility of gout and both were normal.   • History of colon polyps, 11/22/2016-- adenoma ×2. 06/21/2016--tubular adenoma ×2.  Multiple hyperplastic.  Greater than 50 mm hyperplastic polyp at the ileocecal valve.  Rep 6/21/2016 06/01/2017--colonoscopy revealed hyperplastic ×2.  Perianal fistulotomy.  External hemorrhoidectomy of one column.  11/22/2016--colonoscopy revealed a polyp at the ileocecal valve and sigmoid colon.  There were 4 polyps at the rectosigmoid colon.  The single diverticulum in the sigmoid colon was not commented upon.  Pathology returned a sessile serrated adenoma at the ileocecal valve and the sigmoid colon.  The rectosigmoid polyp was hyperplastic.  06/21/2016--colonoscopy revealed a greater than 50 mm polyp at the ileocecal valve.  Pathology returned hyperplastic polyp.  A multilobulated 5 mm  polyp was found in the transverse colon.  Pathology returned tubular adenoma with low grade dysplasia.  3 multilobulated polyps were found in the sigmoid colon.  Pathology revealed fragments of hyperplastic polyps.  2 multilobulated polyps were found in the rectosigmoid colon and pathology returned tubular adenoma with low-grade dysplasia.  A single small mouth diverticula and was found in the sigmoid.  Nonbleeding i   • History of Perirectal abscess 9/1/2016 09/12/2016--patient seen in follow-up for dressing change.  Patient is concerned about not being on an antibiotic and I explained to her that the definitive treatment is incision and drainage.  On exam the wound looks very good with no sign of purulent discharge.  She has a follow-up with general surgeon tomorrow.  09/02/2016--incision and drainage of left perirectal abscess by general surgery.  Cultures returned moderate growth of Klebsiella pneumoniae and heavy growth of Streptococcus agalactiae.   09/01/2016--patient presents with approximately 4 month history of a painful and swollen subcutaneous mass left buttock that is draining foul-smelling purulent material.  Examination confirms a large subcutaneous mass/abscess that I suspect is an infected sebaceous cyst.  General surgery referral given.   • Hyperlipidemia 4/4/2016 03/18/2015--NMR reveals total cholesterol 216, triglycerides mildly elevated at 171. LDL particle number elevated at 1654. Small LDL particle number is normal at 446. HDL particle number normal at 33.4. Recommend diet and lifestyle changes.   • Multiple environmental allergies 11/2/2016    Patient has never had allergy testing.   • Non morbid obesity 4/4/2016 05/01/2017--patient seen in follow-up and reports she stopped the phentermine and topiramate due to side effects, specifically constipation.  Current weight is 232 pounds which is the same as her last visit.  She discontinued these medications and her symptoms resolved.  She  restarted the medications and the symptoms returned.  03/14/2017--patient seen in follow-up and has lost 5 pounds.  She seems to be tolerating the medication well but does have a dry mouth.  I see no reason why we cannot continue the current regimen.  I will have her follow-up in about 5-6 weeks to reassess. Current weight is 232 pounds.  02/16/2017--patient presents with a several year history of weight gain despite multiple attempts at various diet and exercise regimens.  She has failed low carbohydrate diet.  He has not been to Weight Watchers which in my opinion is a farce.  She is asking about Saxenda which I think could be very helpful but I explained to her that it's extremely expensive and her insurance is likely not pay for i   • Vitamin D deficiency 4/4/2016       Past Surgical History:   Procedure Laterality Date   • CERVICAL FUSION  07/23/2012 07/23/2012--anterior cervical spinal fusion, C6--C7 with instrumentation.   • COLONOSCOPY W/ POLYPECTOMY  06/01/2017 06/01/2017--colonoscopy revealed hyperplastic ×2.  Perianal fistulotomy.  External hemorrhoidectomy of one column.   • ENDOMETRIAL ABLATION  2006 2006--endometrial ablation ×2 for menorrhagia/menorrhea.   • PARTIAL HYSTERECTOMY  10/2008    October 2008--partial hysterectomy for menorrhagia/menorrhea. Previously had endometrial ablation procedures 2. Pt states uterus was removed.   • WRIST ARTHROPLASTY Right 07/15/2008    07/15/2008--right radial ulnar distal joint arthroplasty performed for non-traumatic arthritis/impingement syndrome.         Current Facility-Administered Medications:   •  lactated ringers infusion 1,000 mL, 1,000 mL, Intravenous, Continuous, Ej Sanford MD, Last Rate: 25 mL/hr at 11/15/18 0819, 1,000 mL at 11/15/18 0819  •  lidocaine (XYLOCAINE) 1 % injection 0.5 mL, 0.5 mL, Intradermal, Once PRN, Ej Sanford MD  •  sodium chloride 0.9 % flush 3 mL, 3 mL, Intravenous, PRN, Ej Sanford  "MD Edwardo    No Known Allergies    Family History   Problem Relation Age of Onset   • Diabetes Mother         Type 2   • Diabetes Maternal Grandmother         Type 2   • Malig Hyperthermia Neg Hx    • Breast cancer Neg Hx    • Ovarian cancer Neg Hx    • Colon cancer Neg Hx    • Deep vein thrombosis Neg Hx    • Pulmonary embolism Neg Hx        Social History     Socioeconomic History   • Marital status:      Spouse name: Not on file   • Number of children: 2   • Years of education: 12   • Highest education level: Not on file   Social Needs   • Financial resource strain: Not on file   • Food insecurity - worry: Not on file   • Food insecurity - inability: Not on file   • Transportation needs - medical: Not on file   • Transportation needs - non-medical: Not on file   Occupational History   • Occupation:    Tobacco Use   • Smoking status: Current Every Day Smoker     Packs/day: 0.50     Years: 20.00     Pack years: 10.00     Types: Cigarettes   • Smokeless tobacco: Never Used   Substance and Sexual Activity   • Alcohol use: Yes     Comment: Occasional   • Drug use: No   • Sexual activity: Yes     Partners: Male     Birth control/protection: Surgical     Comment: [employer] age 2007   Other Topics Concern   • Not on file   Social History Narrative   • Not on file        Review Of Systems:  A comprehensive review of systems was negative.    Objective:   Physical exam:  /73 (BP Location: Right arm, Patient Position: Lying)   Pulse 63   Temp 97.8 °F (36.6 °C) (Oral)   Resp 16   Ht 177.8 cm (70\")   Wt 108 kg (238 lb 3.2 oz)   SpO2 97%   BMI 34.18 kg/m²     General Appearance:  awake, alert, oriented, in no acute distress and well developed, well nourished  Lungs:  Normal expansion.  Clear to auscultation.  No rales, rhonchi, or wheezing.  Heart:  Heart sounds are normal.  Regular rate and rhythm without murmur, gallop or rub.  Abdomen:  Soft, non-tender, normal bowel sounds; " no bruits, organomegaly or masses.    Assessment:    Rosemarie Gandhi is a 53 y.o. female who presents for evaluation for colon cancer surveillance.    Patient has increased risk factors or warning signs or symptoms.  I reviewed current ACG guidelines for colon cancer screening:    A)  Flex sig q 5yrs with yearly fecal occult blood testing  B)  Virtual colonoscopy  C)  Colonoscopy with possible biopsy/polypectomy with IV sedation at appropriate       screening intervals    The patient has no family history of colon cancer.  She  has a personal history of colon poly0p who presents for colon cancer surveillance evaluation.   I would advise a colonscopy.     The rationale for each option as well as all risks and benefits of each alternative were discussed with the patient in detail.  The patient understands and does wish to proceed with Colonoscopy, Diagnostic        The rationale for colonoscopy with possible biopsy, possible polypectomy, with IV sedation as well as all risks, benefits, and alternatives were discussed with the patient in detail. Risks including but not limited to perforation, bleeding,need for blood transfusion or emergent surgery ,and missed neoplasm were reviewed in detail with the patient The patient demonstrates full understanding and wishes to proceed with the colonoscopy.

## 2018-11-16 ENCOUNTER — TELEPHONE (OUTPATIENT)
Dept: SURGERY | Facility: CLINIC | Age: 53
End: 2018-11-16

## 2018-11-16 LAB
CYTO UR: NORMAL
LAB AP CASE REPORT: NORMAL
PATH REPORT.FINAL DX SPEC: NORMAL
PATH REPORT.GROSS SPEC: NORMAL

## 2018-11-16 NOTE — TELEPHONE ENCOUNTER
I called and spoke with Rosemarie Gandhi regarding her colonoscopy results.     Pathology:   Final Diagnosis   1. Cecum Polyp Biopsy:               A. Hyperplastic polyp.               B. No glandular dysplasia nor malignancy identified.               C. Focal prominent lymphoid aggregate noted.     2. Proximal Descending Colon Polyp Biopsy:               A. Consistent with hyperplastic polyp.               B. No glandular dysplasia nor malignancy identified               C. No active cryptitis, crypt abscess formation nor granulomatous inflammation noted.     3. Left Colon/Distal Descending Colon Polyp Biopsy:               A. Hyperplastic polyp with cautery artifact.               B. No glandular dysplasia nor malignancy identified.     4. Sigmoid Colon Polyp Biopsy:               A. Hyperplastic polyp.               B. No glandular dysplasia nor malignancy identified.     I recommend that she undergoes colonoscopy in 2 years for surveillance     She verbalized understanding and agreed    Ej Sanford MD  General, Minimally Invasive and Endoscopic Surgery  [facility]    [address], [address]  [place], [place], [address]  P: [phone]  F: [phone]

## 2018-11-20 DIAGNOSIS — E78.5 HYPERLIPIDEMIA, UNSPECIFIED HYPERLIPIDEMIA TYPE: Chronic | ICD-10-CM

## 2018-11-20 RX ORDER — PRAVASTATIN SODIUM 40 MG
TABLET ORAL
Qty: 30 TABLET | Refills: 3 | Status: SHIPPED | OUTPATIENT
Start: 2018-11-20 | End: 2019-02-21 | Stop reason: SDUPTHER

## 2018-12-04 DIAGNOSIS — E78.5 HYPERLIPIDEMIA, UNSPECIFIED HYPERLIPIDEMIA TYPE: Chronic | ICD-10-CM

## 2018-12-04 DIAGNOSIS — E55.9 VITAMIN D DEFICIENCY: Chronic | ICD-10-CM

## 2018-12-07 LAB
25(OH)D3+25(OH)D2 SERPL-MCNC: 29.5 NG/ML (ref 30–100)
ALBUMIN SERPL-MCNC: 4.5 G/DL (ref 3.5–5.2)
ALBUMIN/GLOB SERPL: 2.1 G/DL
ALP SERPL-CCNC: 64 U/L (ref 39–117)
ALT SERPL-CCNC: 13 U/L (ref 1–33)
AST SERPL-CCNC: 15 U/L (ref 1–32)
BILIRUB SERPL-MCNC: 0.5 MG/DL (ref 0.1–1.2)
BUN SERPL-MCNC: 10 MG/DL (ref 6–20)
BUN/CREAT SERPL: 13.7 (ref 7–25)
CALCIUM SERPL-MCNC: 9.7 MG/DL (ref 8.6–10.5)
CHLORIDE SERPL-SCNC: 105 MMOL/L (ref 98–107)
CHOLEST SERPL-MCNC: 165 MG/DL (ref 100–199)
CK SERPL-CCNC: 107 U/L (ref 20–180)
CO2 SERPL-SCNC: 23.3 MMOL/L (ref 22–29)
CREAT SERPL-MCNC: 0.73 MG/DL (ref 0.57–1)
GLOBULIN SER CALC-MCNC: 2.1 GM/DL
GLUCOSE SERPL-MCNC: 94 MG/DL (ref 65–99)
HDL SERPL-SCNC: 35.7 UMOL/L
HDLC SERPL-MCNC: 52 MG/DL
LDL SERPL QN: 21.2 NM
LDL SERPL-SCNC: 1042 NMOL/L
LDL SMALL SERPL-SCNC: 301 NMOL/L
LDLC SERPL CALC-MCNC: 84 MG/DL (ref 0–99)
POTASSIUM SERPL-SCNC: 4.3 MMOL/L (ref 3.5–5.2)
PROT SERPL-MCNC: 6.6 G/DL (ref 6–8.5)
SODIUM SERPL-SCNC: 142 MMOL/L (ref 136–145)
TRIGL SERPL-MCNC: 147 MG/DL (ref 0–149)

## 2018-12-28 ENCOUNTER — OFFICE VISIT (OUTPATIENT)
Dept: INTERNAL MEDICINE | Facility: CLINIC | Age: 53
End: 2018-12-28

## 2018-12-28 VITALS
BODY MASS INDEX: 34.13 KG/M2 | HEIGHT: 70 IN | WEIGHT: 238.4 LBS | SYSTOLIC BLOOD PRESSURE: 104 MMHG | DIASTOLIC BLOOD PRESSURE: 60 MMHG | HEART RATE: 70 BPM | OXYGEN SATURATION: 98 %

## 2018-12-28 DIAGNOSIS — E78.5 HYPERLIPIDEMIA, UNSPECIFIED HYPERLIPIDEMIA TYPE: Primary | Chronic | ICD-10-CM

## 2018-12-28 DIAGNOSIS — Z86.010 HISTORY OF COLON POLYPS: Chronic | ICD-10-CM

## 2018-12-28 DIAGNOSIS — M54.41 CHRONIC BILATERAL LOW BACK PAIN WITH RIGHT-SIDED SCIATICA: Chronic | ICD-10-CM

## 2018-12-28 DIAGNOSIS — K57.30 DIVERTICULOSIS OF LARGE INTESTINE WITHOUT HEMORRHAGE: Chronic | ICD-10-CM

## 2018-12-28 DIAGNOSIS — G89.29 CHRONIC BILATERAL LOW BACK PAIN WITH RIGHT-SIDED SCIATICA: Chronic | ICD-10-CM

## 2018-12-28 DIAGNOSIS — J30.1 CHRONIC SEASONAL ALLERGIC RHINITIS DUE TO POLLEN: Chronic | ICD-10-CM

## 2018-12-28 DIAGNOSIS — Z51.81 THERAPEUTIC DRUG MONITORING: ICD-10-CM

## 2018-12-28 DIAGNOSIS — Z00.00 ROUTINE PHYSICAL EXAMINATION: ICD-10-CM

## 2018-12-28 DIAGNOSIS — Z91.09 MULTIPLE ENVIRONMENTAL ALLERGIES: Chronic | ICD-10-CM

## 2018-12-28 DIAGNOSIS — E55.9 VITAMIN D DEFICIENCY: Chronic | ICD-10-CM

## 2018-12-28 PROBLEM — M51.26 HERNIATED LUMBAR INTERVERTEBRAL DISC: Status: RESOLVED | Noted: 2018-05-31 | Resolved: 2018-12-28

## 2018-12-28 PROCEDURE — 99214 OFFICE O/P EST MOD 30 MIN: CPT | Performed by: INTERNAL MEDICINE

## 2018-12-28 NOTE — PROGRESS NOTES
12/28/2018    Patient Information  Rosemarie CHINCHILLA [address] [address]      1965  [unfilled]  There is no work phone number on file.    Chief Complaint:     Follow-up hyperlipidemia, vitamin D deficiency, history of colon polyps and recent colonoscopy, diverticulosis, environmental allergies, chronic lower back pain.  No new acute complaints.    History of Present Illness:    Patient with history of medical problems as outlined in chief complaint presents today for a follow-up with lab prior in order to monitor her chronic medical issues.  Patient has had multiple colon polyps and had a recent colonoscopy which we will review today.  Her past medical history reviewed and updated where necessary including health maintenance parameters.  This reveals she is up-to-date or else accounted for with the exception of the new shingles vaccine and hepatitis A vaccine.  I advised her she can get that at the local drug store when they become available.  He prefers not to have influenza vaccine.    Review of Systems   Constitution: Negative.   HENT: Negative.    Eyes: Negative.    Cardiovascular: Negative.    Respiratory: Negative.    Endocrine: Negative.    Hematologic/Lymphatic: Negative.    Skin: Negative.    Musculoskeletal: Positive for arthritis and back pain.   Gastrointestinal: Negative.    Genitourinary: Negative.    Neurological: Negative.    Psychiatric/Behavioral: Negative.    Allergic/Immunologic: Negative.        Active Problems:    Patient Active Problem List   Diagnosis   • Degeneration of intervertebral disc of mid-cervical region   • Hallux valgus of left foot   • Hyperlipidemia   • Non morbid obesity   • Vitamin D deficiency   • Therapeutic drug monitoring   • Routine physical examination   • History of colon polyps, 11/15/2018--multiple hyperplastic.  11/22/2016-- adenoma ×2.  06/21/2016--tubular adenoma ×2.  Multiple hyperplastic.     • Diverticulosis of colon   • Multiple environmental allergies   • Allergic rhinitis   • Chronic right hip pain   • Chronic bilateral low back pain with right-sided sciatica         Past Medical History:   Diagnosis Date   • Abnormal weight gain 2/16/2017 05/01/2017--patient seen in follow-up and reports she stopped the phentermine and topiramate due to side effects, specifically constipation.  Current weight is 232 pounds which is the same as her last visit. She discontinued these medications and her symptoms resolved.  She restarted the medications and the symptoms returned.   03/14/2017--patient seen in follow-up and has lost 5 pounds.  She seems to be tolerating the medication well but does have a dry mouth.  I see no reason why we cannot continue the current regimen.  I will have her follow-up in about 5-6 weeks to reassess.  Current weight is 232 pounds.  02/16/2017--patient presents with a several year history of weight gain despite multiple attempts at various diet and exercise regimens.  She has failed low carbohydrate diet.  He has not been to Weight [employer]chers which in my opinion is a farce.  She is asking about Saxenda which I think could be very helpful but I explained to her that it's extremely expensive and her insurance is likely not pay for   • Allergic rhinitis 11/2/2016    Patient has never had allergy testing.   • Chronic bilateral low back pain with right-sided sciatica 5/30/2018 08/08/2018--patient seen in follow-up and reports her symptoms are tolerable although she still has right-sided intermittent lumbar radicular symptoms.  Her back in itself is not particularly bothersome.  It is not particularly interfering with her daily activities.  We discussed options including referral back to neurosurgery versus consideration of physical therapy and perhaps some oral predniso   • Degeneration of intervertebral disc of mid-cervical  region 12/23/2015 12/23/2015--MRI cervical spine reveals at C4-C5 there is mild spinal stenosis. There is moderate to severe bilateral foraminal narrowing. At C5-C6 there is mild left but no right foraminal narrowing. At C6-C7 there are surgical changes with hardware and this results in artifact. Evaluation for solid bony fusion cannot be done because of this. There is only minimal if any canal narrowing at C6-C7. There is mild right and mild to moderate left bony foraminal narrowing. C7-T1 reveals minimal right foraminal narrowing.    • Disc degeneration, lumbar 5/31/2018 06/07/2018--patient seen in follow-up and reports her hip pain is much better after starting prednisone.  She feels like symptoms are good enough that we can not proceed with any further evaluation or treatment at this time.  05/31/2018--x-ray of the right hip revealed no acute or chronic osseous or soft tissue abnormality.  X-ray of the lumbar spine reveals degenerative disc changes at L3-L4, L4-L5, and L5-S1.  Minor endplate osteophytic changes are noted at multiple levels.  No acute abnormality seen.  Diffuse facet arthritic changes at L4-L5 and L5-S1.  Recommend MRI lumbar spine if clinically indicated.  Patient notified and started on prednisone 50 mg by mouth daily ×5 days, taper and discontinue.  05/30/2018--patient presents with approximately two-week history of right hip pain which seems to be located laterally and also radiates in towards the inguinal ligament region.  No known trauma.  The pain is worse with increasing weightbearing and also with sleeping on her right side.  No problems with si   • Diverticulosis of colon 6/21/2016 11/22/2016--colonoscopy revealed a polyp at the ileocecal valve and sigmoid colon.  There were 4 polyps at the rectosigmoid colon.  The single diverticulum in the sigmoid colon was not commented upon.  Pathology returned a sessile serrated adenoma at the ileocecal valve and the sigmoid colon.  The  rectosigmoid polyp was hyperplastic.  06/21/2016--colonoscopy revealed a greater than 50 mm polyp at the ileocecal valve.  Pathology returned hyperplastic polyp.  A multilobulated 5 mm polyp was found in the transverse colon.  Pathology returned tubular adenoma with low grade dysplasia.  3 multilobulated polyps were found in the sigmoid colon.  Pathology revealed fragments of hyperplastic polyps.  2 multilobulated polyps were found in the rectosigmoid colon and pathology returned tubular adenoma with low-grade dysplasia.  A single small mouth diverticula and was found in the sigmoid.  Nonbleeding internal hemorrhoids.  General surgery recommended repeat colonoscopy in 3 months.   • Hallux valgus of left foot 4/4/2016 03/13/2015--patient was evaluated by the podiatrist and assessment was ingrown great toenail and paronychia. Partial nail avulsion completed.   03/13/2015--patient presents with a greater than one-year history of intermittent pain, swelling, redness, tenderness involving her left great toe. Examination reveals early hallux valgus deformity as well as a possible ingrown nail. Patient referred to podiatry. Uric acid and CRP obtained to rule out possibility of gout and both were normal.   • Herniated lumbar intervertebral disc 5/31/2018 08/08/2018--patient seen in follow-up and reports her symptoms are tolerable although she still has right-sided intermittent lumbar radicular symptoms.  Her back in itself is not particularly bothersome.  It is not particularly interfering with her daily activities.  We discussed options including referral back to neurosurgery versus consideration of physical therapy and perhaps some oral predniso   • History of colon polyps, 11/15/2018--multiple hyperplastic.  11/22/2016-- adenoma ×2. 06/21/2016--tubular adenoma ×2.  Multiple hyperplastic.   6/21/2016    11/15/2018--colonoscopy revealed several small polyps at the ileocecal valve that were removed piecemeal.   #10, 1-2 mm polyps in the sigmoid colon and proximal descending colon.  Removed.  #1, 3 mm polyp in the distal descending colon.  Removed.  Nonbleeding internal hemorrhoids.  Pathology returned multiple hyperplastic polyps.  06/01/2017--colonoscopy revealed hyperplastic ×2.  Perianal fistulot   • History of Perirectal abscess 9/1/2016 09/12/2016--patient seen in follow-up for dressing change.  Patient is concerned about not being on an antibiotic and I explained to her that the definitive treatment is incision and drainage.  On exam the wound looks very good with no sign of purulent discharge.  She has a follow-up with general surgeon tomorrow.  09/02/2016--incision and drainage of left perirectal abscess by general surgery.  Cultures returned moderate growth of Klebsiella pneumoniae and heavy growth of Streptococcus agalactiae.   09/01/2016--patient presents with approximately 4 month history of a painful and swollen subcutaneous mass left buttock that is draining foul-smelling purulent material.  Examination confirms a large subcutaneous mass/abscess that I suspect is an infected sebaceous cyst.  General surgery referral given.   • Hyperlipidemia 4/4/2016 03/18/2015--NMR reveals total cholesterol 216, triglycerides mildly elevated at 171. LDL particle number elevated at 1654. Small LDL particle number is normal at 446. HDL particle number normal at 33.4. Recommend diet and lifestyle changes.   • Multiple environmental allergies 11/2/2016    Patient has never had allergy testing.   • Non morbid obesity 4/4/2016 05/01/2017--patient seen in follow-up and reports she stopped the phentermine and topiramate due to side effects, specifically constipation.  Current weight is 232 pounds which is the same as her last visit.  She discontinued these medications and her symptoms resolved.  She restarted the medications and the symptoms returned.  03/14/2017--patient seen in follow-up and has lost 5 pounds.  She seems  to be tolerating the medication well but does have a dry mouth.  I see no reason why we cannot continue the current regimen.  I will have her follow-up in about 5-6 weeks to reassess. Current weight is 232 pounds.  02/16/2017--patient presents with a several year history of weight gain despite multiple attempts at various diet and exercise regimens.  She has failed low carbohydrate diet.  He has not been to Weight Watchers which in my opinion is a farce.  She is asking about Saxenda which I think could be very helpful but I explained to her that it's extremely expensive and her insurance is likely not pay for i   • Vitamin D deficiency 4/4/2016         Past Surgical History:   Procedure Laterality Date   • CERVICAL FUSION  07/23/2012 07/23/2012--anterior cervical spinal fusion, C6--C7 with instrumentation.   • COLONOSCOPY N/A 6/21/2016 06/21/2016--colonoscopy revealed a greater than 50 mm polyp at the ileocecal valve.  Pathology returned hyperplastic polyp.  A multilobulated 5 mm polyp was found in the transverse colon.  Pathology returned tubular adenoma with low grade dysplasia.  3 multilobulated polyps were found in the sigmoid colon.  Pathology revealed fragments of hyperplastic polyps.  2 multilobulated polyps were found in   • COLONOSCOPY N/A 11/22/2016 11/22/2016--colonoscopy revealed a polyp at the ileocecal valve and sigmoid colon.  There were 4 polyps at the rectosigmoid colon.  The single diverticulum in the sigmoid colon was not commented upon.  Pathology returned a sessile serrated adenoma at the ileocecal valve and the sigmoid colon.  The rectosigmoid polyp was hyperplastic.   • COLONOSCOPY N/A 11/15/2018    11/15/2018--colonoscopy revealed several small polyps at the ileocecal valve that were removed piecemeal.  #10, 1-2 mm polyps in the sigmoid colon and proximal descending colon.  Removed.  #1, 3 mm polyp in the distal descending colon.  Removed.  Nonbleeding internal hemorrhoids.   Pathology returned multiple hyperplastic polyps.   • COLONOSCOPY W/ POLYPECTOMY  06/01/2017 06/01/2017--colonoscopy revealed hyperplastic ×2.  Perianal fistulotomy.  External hemorrhoidectomy of one column.   • ENDOMETRIAL ABLATION  2006 2006--endometrial ablation ×2 for menorrhagia/menorrhea.   • INCISION AND DRAINAGE PERIRECTAL ABSCESS N/A 9/2/2016 09/02/2016--incision and drainage of perirectal abscess.   • PARTIAL HYSTERECTOMY  10/2008    October 2008--partial hysterectomy for menorrhagia/menorrhea. Previously had endometrial ablation procedures 2. Pt states uterus was removed.   • RECTAL EXAMINATION UNDER ANESTHESIA N/A 6/1/2017 06/01/2017--colonoscopy revealed hyperplastic ×2.  Perianal fistulotomy.  External hemorrhoidectomy of one column.   • RECTAL FISTULOTOMY N/A 9/29/2017 09/29/2017--rectal fistulotomy.  Rectal exam under anesthesia.   • WRIST ARTHROPLASTY Right 07/15/2008    07/15/2008--right radial ulnar distal joint arthroplasty performed for non-traumatic arthritis/impingement syndrome.         No Known Allergies        Current Outpatient Medications:   •  B Complex Vitamins (VITAMIN B COMPLEX PO), Take 1 capsule by mouth Daily., Disp: , Rfl:   •  Cholecalciferol (VITAMIN D3) 5000 UNITS tablet, Take 1 tablet by mouth Daily., Disp: , Rfl:   •  gabapentin (NEURONTIN) 600 MG tablet, TAKE 1 TABLET BY MOUTH TWICE A DAY, Disp: 60 tablet, Rfl: 1  •  Glucosamine HCl 1000 MG tablet, Take 1 tablet by mouth 2 (Two) Times a Day., Disp: , Rfl:   •  pravastatin (PRAVACHOL) 40 MG tablet, 1 by mouth daily for high cholesterol, Disp: 30 tablet, Rfl: 3  •  cetirizine (zyrTEC) 10 MG tablet, Take 10 mg by mouth Daily., Disp: , Rfl:   •  ibuprofen (ADVIL,MOTRIN) 200 MG tablet, Take 200 mg by mouth every 6 (six) hours as needed for mild pain (1-3)., Disp: , Rfl:       Family History   Problem Relation Age of Onset   • Diabetes Mother         Type 2   • Diabetes Maternal Grandmother         Type 2   • Nas  "Hyperthermia Neg Hx    • Breast cancer Neg Hx    • Ovarian cancer Neg Hx    • Colon cancer Neg Hx    • Deep vein thrombosis Neg Hx    • Pulmonary embolism Neg Hx          Social History     Socioeconomic History   • Marital status:      Spouse name: Not on file   • Number of children: 2   • Years of education: 12   • Highest education level: Not on file   Social Needs   • Financial resource strain: Not very hard   • Food insecurity - worry: Never true   • Food insecurity - inability: Never true   • Transportation needs - medical: No   • Transportation needs - non-medical: No   Occupational History   • Occupation:    Tobacco Use   • Smoking status: Current Every Day Smoker     Packs/day: 0.50     Years: 20.00     Pack years: 10.00     Types: Cigarettes   • Smokeless tobacco: Never Used   Substance and Sexual Activity   • Alcohol use: Yes     Alcohol/week: 0.6 oz     Types: 1 Glasses of wine per week     Frequency: 2-4 times a month     Drinks per session: 1 or 2     Binge frequency: Never     Comment: Occasional   • Drug use: No   • Sexual activity: Yes     Partners: Male     Birth control/protection: Surgical     Comment: [employer] [place] age 2007   Other Topics Concern   • Not on file   Social History Narrative   • Not on file         Vitals:    12/28/18 1532   BP: 104/60   BP Location: Left arm   Pulse: 70   SpO2: 98%   Weight: 108 kg (238 lb 6.4 oz)   Height: 177.8 cm (70\")          Physical Exam:    General: Alert and oriented x 3.  No acute distress.  Obese. Normal affect.  HEENT: Pupils equal, round, reactive to light; extraocular movements intact; sclerae nonicteric; pharynx, ear canals and TMs normal.  Neck: Without JVD, thyromegaly, bruit, or adenopathy.  Lungs: Clear to auscultation in all fields.  Heart: Regular rate and rhythm without murmur, rub, gallop, or click.  Abdomen: Soft, nontender, without hepatosplenomegaly or hernia.  Bowel sounds normal.  : Deferred.  Rectal: " Deferred.  Extremities: Without clubbing, cyanosis, edema, or pulse deficit.  Neurologic: Intact without focal deficit.  Normal station and gait observed during ingress and egress from the examination room.  Skin: Without significant lesion.  Musculoskeletal: Unremarkable.    Lab/other results:    NMR reveals total cholesterol 165.  Triglycerides 147.  LDL particle number slightly elevated at 1042.  Small LDL particle number excellent at 301.  HDL particle number is normal at 35.7.  CMP normal.  Vitamin D is slightly low at 29.5.  CPK normal.    Assessment/Plan:     Diagnosis Plan   1. Hyperlipidemia, unspecified hyperlipidemia type     2. Vitamin D deficiency     3. History of colon polyps, 11/15/2018--multiple hyperplastic.  11/22/2016-- adenoma ×2. 06/21/2016--tubular adenoma ×2.  Multiple hyperplastic.       4. Diverticulosis of colon     5. Multiple environmental allergies     6. Allergic rhinitis     7. Chronic bilateral low back pain with right-sided sciatica     8. Therapeutic drug monitoring     9. Routine physical examination       Patient has hyperlipidemia that under good control.  Her vitamin D is slightly low and I encouraged her to take vitamin D 5000 units per day.  She has a history of colon polyps and had a recent colonoscopy which revealed multiple polyps.  However, these were all hyperplastic polyps which do not increase his risk for colon cancer.  Her next colonoscopy will be due in 3 years.  She also has diverticulosis which is asymptomatic.  Diet discussed.  Allergies seem to be controlled.  Her chronic low back pain is tolerable.    Plan is as follows: No change in current medical regimen.  Recommend the new shingles vaccine and hepatitis A vaccine.  Patient will follow-up after 06/08/2019 with lab prior for her annual exam.  If things look well at that time we can see her on a yearly basis.        Procedures

## 2019-01-15 ENCOUNTER — TELEPHONE (OUTPATIENT)
Dept: INTERNAL MEDICINE | Facility: CLINIC | Age: 54
End: 2019-01-15

## 2019-02-21 DIAGNOSIS — E78.5 HYPERLIPIDEMIA, UNSPECIFIED HYPERLIPIDEMIA TYPE: Chronic | ICD-10-CM

## 2019-02-21 RX ORDER — PRAVASTATIN SODIUM 40 MG
TABLET ORAL
Qty: 30 TABLET | Refills: 4 | Status: SHIPPED | OUTPATIENT
Start: 2019-02-21 | End: 2019-05-19 | Stop reason: SDUPTHER

## 2019-05-19 DIAGNOSIS — E78.5 HYPERLIPIDEMIA, UNSPECIFIED HYPERLIPIDEMIA TYPE: Chronic | ICD-10-CM

## 2019-05-20 RX ORDER — PRAVASTATIN SODIUM 40 MG
TABLET ORAL
Qty: 30 TABLET | Refills: 3 | Status: SHIPPED | OUTPATIENT
Start: 2019-05-20 | End: 2019-08-20 | Stop reason: SDUPTHER

## 2019-06-04 DIAGNOSIS — E78.5 HYPERLIPIDEMIA, UNSPECIFIED HYPERLIPIDEMIA TYPE: Chronic | ICD-10-CM

## 2019-06-04 DIAGNOSIS — Z00.00 ROUTINE PHYSICAL EXAMINATION: ICD-10-CM

## 2019-06-04 DIAGNOSIS — E55.9 VITAMIN D DEFICIENCY: Chronic | ICD-10-CM

## 2019-06-07 LAB
25(OH)D3+25(OH)D2 SERPL-MCNC: 40.1 NG/ML (ref 30–100)
ALBUMIN SERPL-MCNC: 4.3 G/DL (ref 3.5–5.2)
ALBUMIN/GLOB SERPL: 2.2 G/DL
ALP SERPL-CCNC: 53 U/L (ref 39–117)
ALT SERPL-CCNC: 11 U/L (ref 1–33)
AST SERPL-CCNC: 16 U/L (ref 1–32)
BILIRUB SERPL-MCNC: 0.3 MG/DL (ref 0.2–1.2)
BUN SERPL-MCNC: 11 MG/DL (ref 6–20)
BUN/CREAT SERPL: 15.3 (ref 7–25)
CALCIUM SERPL-MCNC: 10.2 MG/DL (ref 8.6–10.5)
CHLORIDE SERPL-SCNC: 107 MMOL/L (ref 98–107)
CHOLEST SERPL-MCNC: 165 MG/DL (ref 100–199)
CK SERPL-CCNC: 119 U/L (ref 20–180)
CO2 SERPL-SCNC: 28.3 MMOL/L (ref 22–29)
CREAT SERPL-MCNC: 0.72 MG/DL (ref 0.57–1)
ERYTHROCYTE [DISTWIDTH] IN BLOOD BY AUTOMATED COUNT: 13.5 % (ref 12.3–15.4)
GLOBULIN SER CALC-MCNC: 2 GM/DL
GLUCOSE SERPL-MCNC: 92 MG/DL (ref 65–99)
HCT VFR BLD AUTO: 44 % (ref 34–46.6)
HDL SERPL-SCNC: 34.5 UMOL/L
HDLC SERPL-MCNC: 56 MG/DL
HGB BLD-MCNC: 14.1 G/DL (ref 12–15.9)
LDL SERPL QN: 20.9 NM
LDL SERPL-SCNC: 1244 NMOL/L
LDL SMALL SERPL-SCNC: 632 NMOL/L
LDLC SERPL CALC-MCNC: 90 MG/DL (ref 0–99)
MCH RBC QN AUTO: 29.4 PG (ref 26.6–33)
MCHC RBC AUTO-ENTMCNC: 32 G/DL (ref 31.5–35.7)
MCV RBC AUTO: 91.7 FL (ref 79–97)
PLATELET # BLD AUTO: 243 10*3/MM3 (ref 140–450)
POTASSIUM SERPL-SCNC: 4.7 MMOL/L (ref 3.5–5.2)
PROT SERPL-MCNC: 6.3 G/DL (ref 6–8.5)
RBC # BLD AUTO: 4.8 10*6/MM3 (ref 3.77–5.28)
SODIUM SERPL-SCNC: 144 MMOL/L (ref 136–145)
T3FREE SERPL-MCNC: 3.2 PG/ML (ref 2–4.4)
T4 FREE SERPL-MCNC: 1.25 NG/DL (ref 0.93–1.7)
TRIGL SERPL-MCNC: 95 MG/DL (ref 0–149)
TSH SERPL DL<=0.005 MIU/L-ACNC: 3.05 MIU/ML (ref 0.27–4.2)
WBC # BLD AUTO: 7.3 10*3/MM3 (ref 3.4–10.8)

## 2019-06-12 ENCOUNTER — OFFICE VISIT (OUTPATIENT)
Dept: INTERNAL MEDICINE | Facility: CLINIC | Age: 54
End: 2019-06-12

## 2019-06-12 VITALS
HEIGHT: 70 IN | WEIGHT: 221.6 LBS | OXYGEN SATURATION: 98 % | HEART RATE: 73 BPM | SYSTOLIC BLOOD PRESSURE: 106 MMHG | DIASTOLIC BLOOD PRESSURE: 60 MMHG | BODY MASS INDEX: 31.73 KG/M2

## 2019-06-12 DIAGNOSIS — G89.29 CHRONIC RIGHT HIP PAIN: Chronic | ICD-10-CM

## 2019-06-12 DIAGNOSIS — E78.5 HYPERLIPIDEMIA, UNSPECIFIED HYPERLIPIDEMIA TYPE: Chronic | ICD-10-CM

## 2019-06-12 DIAGNOSIS — Z91.09 MULTIPLE ENVIRONMENTAL ALLERGIES: Chronic | ICD-10-CM

## 2019-06-12 DIAGNOSIS — J30.1 CHRONIC SEASONAL ALLERGIC RHINITIS DUE TO POLLEN: Chronic | ICD-10-CM

## 2019-06-12 DIAGNOSIS — Z86.010 HISTORY OF COLON POLYPS: Chronic | ICD-10-CM

## 2019-06-12 DIAGNOSIS — Z71.6 ENCOUNTER FOR SMOKING CESSATION COUNSELING: ICD-10-CM

## 2019-06-12 DIAGNOSIS — G89.29 CHRONIC BILATERAL LOW BACK PAIN WITH RIGHT-SIDED SCIATICA: Chronic | ICD-10-CM

## 2019-06-12 DIAGNOSIS — E55.9 VITAMIN D DEFICIENCY: Chronic | ICD-10-CM

## 2019-06-12 DIAGNOSIS — M54.41 CHRONIC BILATERAL LOW BACK PAIN WITH RIGHT-SIDED SCIATICA: Chronic | ICD-10-CM

## 2019-06-12 DIAGNOSIS — E66.9 NON MORBID OBESITY: Chronic | ICD-10-CM

## 2019-06-12 DIAGNOSIS — Z00.00 ROUTINE PHYSICAL EXAMINATION: Primary | ICD-10-CM

## 2019-06-12 DIAGNOSIS — M25.551 CHRONIC RIGHT HIP PAIN: Chronic | ICD-10-CM

## 2019-06-12 DIAGNOSIS — Z51.81 THERAPEUTIC DRUG MONITORING: ICD-10-CM

## 2019-06-12 DIAGNOSIS — F17.210 CIGARETTE NICOTINE DEPENDENCE WITHOUT COMPLICATION: ICD-10-CM

## 2019-06-12 DIAGNOSIS — M50.320 DEGENERATION OF INTERVERTEBRAL DISC OF MID-CERVICAL REGION, UNSPECIFIED SPINAL LEVEL: Chronic | ICD-10-CM

## 2019-06-12 PROCEDURE — 99396 PREV VISIT EST AGE 40-64: CPT | Performed by: INTERNAL MEDICINE

## 2019-06-12 NOTE — PROGRESS NOTES
06/12/2019    Patient Information  Rosemarie CHINCHILLA [address] [address]      1965  [unfilled]  There is no work phone number on file.    Chief Complaint:     Routine annual physical examination and follow-up lab work.  No new acute complaints.    History of Present Illness:    Patient with a history of hyperlipidemia, colon polyps, cervical disc degeneration, chronic lower back pain and chronic right hip pain, environmental allergies/allergic rhinitis.  She presents today for routine annual physical exam and follow-up lab work.  She currently feels fairly well although she did have a recent URI and the symptoms have resolved.  She had some nausea and vomiting with this as well.  Her past medical history reviewed and updated were necessary including health maintenance parameters.  This reveals she is up-to-date or else accounted for after today's visit.    Review of Systems   Constitution: Negative.   HENT: Negative.    Eyes: Negative.    Cardiovascular: Negative.    Respiratory: Negative.    Endocrine: Negative.    Hematologic/Lymphatic: Negative.    Skin: Negative.    Musculoskeletal: Positive for arthritis, back pain and neck pain.   Gastrointestinal: Negative.    Genitourinary: Negative.    Neurological: Negative.    Psychiatric/Behavioral: Negative.    Allergic/Immunologic: Negative.        Active Problems:    Patient Active Problem List   Diagnosis   • Degeneration of intervertebral disc of mid-cervical region   • Hallux valgus of left foot   • Hyperlipidemia   • Non morbid obesity   • Vitamin D deficiency   • Therapeutic drug monitoring   • Routine physical examination   • History of colon polyps, 11/15/2018--multiple hyperplastic.  11/22/2016-- adenoma ×2. 06/21/2016--tubular adenoma ×2.  Multiple hyperplastic.     • Diverticulosis of colon   • Multiple environmental allergies    • Allergic rhinitis   • Chronic right hip pain   • Chronic bilateral low back pain with right-sided sciatica         Past Medical History:   Diagnosis Date   • Abnormal weight gain 2/16/2017 05/01/2017--patient seen in follow-up and reports she stopped the phentermine and topiramate due to side effects, specifically constipation.  Current weight is 232 pounds which is the same as her last visit. She discontinued these medications and her symptoms resolved.  She restarted the medications and the symptoms returned.   03/14/2017--patient seen in follow-up and has lost 5 pounds.  She seems to be tolerating the medication well but does have a dry mouth.  I see no reason why we cannot continue the current regimen.  I will have her follow-up in about 5-6 weeks to reassess.  Current weight is 232 pounds.  02/16/2017--patient presents with a several year history of weight gain despite multiple attempts at various diet and exercise regimens.  She has failed low carbohydrate diet.  He has not been to Weight Watchers which in my opinion is a farce.  She is asking about Saxenda which I think could be very helpful but I explained to her that it's extremely expensive and her insurance is likely not pay for   • Allergic rhinitis 11/2/2016    Patient has never had allergy testing.   • Chronic bilateral low back pain with right-sided sciatica 5/30/2018 08/08/2018--patient seen in follow-up and reports her symptoms are tolerable although she still has right-sided intermittent lumbar radicular symptoms.  Her back in itself is not particularly bothersome.  It is not particularly interfering with her daily activities.  We discussed options including referral back to neurosurgery versus consideration of physical therapy and perhaps some oral predniso   • Degeneration of intervertebral disc of mid-cervical region 12/23/2015 12/23/2015--MRI cervical spine reveals at C4-C5 there is mild spinal stenosis. There is moderate to severe  bilateral foraminal narrowing. At C5-C6 there is mild left but no right foraminal narrowing. At C6-C7 there are surgical changes with hardware and this results in artifact. Evaluation for solid bony fusion cannot be done because of this. There is only minimal if any canal narrowing at C6-C7. There is mild right and mild to moderate left bony foraminal narrowing. C7-T1 reveals minimal right foraminal narrowing.    • Disc degeneration, lumbar 5/31/2018 06/07/2018--patient seen in follow-up and reports her hip pain is much better after starting prednisone.  She feels like symptoms are good enough that we can not proceed with any further evaluation or treatment at this time.  05/31/2018--x-ray of the right hip revealed no acute or chronic osseous or soft tissue abnormality.  X-ray of the lumbar spine reveals degenerative disc changes at L3-L4, L4-L5, and L5-S1.  Minor endplate osteophytic changes are noted at multiple levels.  No acute abnormality seen.  Diffuse facet arthritic changes at L4-L5 and L5-S1.  Recommend MRI lumbar spine if clinically indicated.  Patient notified and started on prednisone 50 mg by mouth daily ×5 days, taper and discontinue.  05/30/2018--patient presents with approximately two-week history of right hip pain which seems to be located laterally and also radiates in towards the inguinal ligament region.  No known trauma.  The pain is worse with increasing weightbearing and also with sleeping on her right side.  No problems with si   • Diverticulosis of colon 6/21/2016 11/22/2016--colonoscopy revealed a polyp at the ileocecal valve and sigmoid colon.  There were 4 polyps at the rectosigmoid colon.  The single diverticulum in the sigmoid colon was not commented upon.  Pathology returned a sessile serrated adenoma at the ileocecal valve and the sigmoid colon.  The rectosigmoid polyp was hyperplastic.  06/21/2016--colonoscopy revealed a greater than 50 mm polyp at the ileocecal valve.   Pathology returned hyperplastic polyp.  A multilobulated 5 mm polyp was found in the transverse colon.  Pathology returned tubular adenoma with low grade dysplasia.  3 multilobulated polyps were found in the sigmoid colon.  Pathology revealed fragments of hyperplastic polyps.  2 multilobulated polyps were found in the rectosigmoid colon and pathology returned tubular adenoma with low-grade dysplasia.  A single small mouth diverticula and was found in the sigmoid.  Nonbleeding internal hemorrhoids.  General surgery recommended repeat colonoscopy in 3 months.   • Hallux valgus of left foot 4/4/2016 03/13/2015--patient was evaluated by the podiatrist and assessment was ingrown great toenail and paronychia. Partial nail avulsion completed.   03/13/2015--patient presents with a greater than one-year history of intermittent pain, swelling, redness, tenderness involving her left great toe. Examination reveals early hallux valgus deformity as well as a possible ingrown nail. Patient referred to podiatry. Uric acid and CRP obtained to rule out possibility of gout and both were normal.   • Herniated lumbar intervertebral disc 5/31/2018 08/08/2018--patient seen in follow-up and reports her symptoms are tolerable although she still has right-sided intermittent lumbar radicular symptoms.  Her back in itself is not particularly bothersome.  It is not particularly interfering with her daily activities.  We discussed options including referral back to neurosurgery versus consideration of physical therapy and perhaps some oral predniso   • History of colon polyps, 11/15/2018--multiple hyperplastic.  11/22/2016-- adenoma ×2. 06/21/2016--tubular adenoma ×2.  Multiple hyperplastic.   6/21/2016    11/15/2018--colonoscopy revealed several small polyps at the ileocecal valve that were removed piecemeal.  #10, 1-2 mm polyps in the sigmoid colon and proximal descending colon.  Removed.  #1, 3 mm polyp in the distal descending  colon.  Removed.  Nonbleeding internal hemorrhoids.  Pathology returned multiple hyperplastic polyps.  06/01/2017--colonoscopy revealed hyperplastic ×2.  Perianal fistulot   • History of Perirectal abscess 9/1/2016 09/12/2016--patient seen in follow-up for dressing change.  Patient is concerned about not being on an antibiotic and I explained to her that the definitive treatment is incision and drainage.  On exam the wound looks very good with no sign of purulent discharge.  She has a follow-up with general surgeon tomorrow.  09/02/2016--incision and drainage of left perirectal abscess by general surgery.  Cultures returned moderate growth of Klebsiella pneumoniae and heavy growth of Streptococcus agalactiae.   09/01/2016--patient presents with approximately 4 month history of a painful and swollen subcutaneous mass left buttock that is draining foul-smelling purulent material.  Examination confirms a large subcutaneous mass/abscess that I suspect is an infected sebaceous cyst.  General surgery referral given.   • Hyperlipidemia 4/4/2016 03/18/2015--NMR reveals total cholesterol 216, triglycerides mildly elevated at 171. LDL particle number elevated at 1654. Small LDL particle number is normal at 446. HDL particle number normal at 33.4. Recommend diet and lifestyle changes.   • Multiple environmental allergies 11/2/2016    Patient has never had allergy testing.   • Non morbid obesity 4/4/2016 05/01/2017--patient seen in follow-up and reports she stopped the phentermine and topiramate due to side effects, specifically constipation.  Current weight is 232 pounds which is the same as her last visit.  She discontinued these medications and her symptoms resolved.  She restarted the medications and the symptoms returned.  03/14/2017--patient seen in follow-up and has lost 5 pounds.  She seems to be tolerating the medication well but does have a dry mouth.  I see no reason why we cannot continue the current  regimen.  I will have her follow-up in about 5-6 weeks to reassess. Current weight is 232 pounds.  02/16/2017--patient presents with a several year history of weight gain despite multiple attempts at various diet and exercise regimens.  She has failed low carbohydrate diet.  He has not been to Weight Watchers which in my opinion is a farce.  She is asking about Saxenda which I think could be very helpful but I explained to her that it's extremely expensive and her insurance is likely not pay for i   • Vitamin D deficiency 4/4/2016         Past Surgical History:   Procedure Laterality Date   • CERVICAL FUSION  07/23/2012 07/23/2012--anterior cervical spinal fusion, C6--C7 with instrumentation.   • COLONOSCOPY N/A 6/21/2016 06/21/2016--colonoscopy revealed a greater than 50 mm polyp at the ileocecal valve.  Pathology returned hyperplastic polyp.  A multilobulated 5 mm polyp was found in the transverse colon.  Pathology returned tubular adenoma with low grade dysplasia.  3 multilobulated polyps were found in the sigmoid colon.  Pathology revealed fragments of hyperplastic polyps.  2 multilobulated polyps were found in   • COLONOSCOPY N/A 11/22/2016 11/22/2016--colonoscopy revealed a polyp at the ileocecal valve and sigmoid colon.  There were 4 polyps at the rectosigmoid colon.  The single diverticulum in the sigmoid colon was not commented upon.  Pathology returned a sessile serrated adenoma at the ileocecal valve and the sigmoid colon.  The rectosigmoid polyp was hyperplastic.   • COLONOSCOPY N/A 11/15/2018    11/15/2018--colonoscopy revealed several small polyps at the ileocecal valve that were removed piecemeal.  #10, 1-2 mm polyps in the sigmoid colon and proximal descending colon.  Removed.  #1, 3 mm polyp in the distal descending colon.  Removed.  Nonbleeding internal hemorrhoids.  Pathology returned multiple hyperplastic polyps.   • COLONOSCOPY W/ POLYPECTOMY  06/01/2017 06/01/2017--colonoscopy  revealed hyperplastic ×2.  Perianal fistulotomy.  External hemorrhoidectomy of one column.   • ENDOMETRIAL ABLATION  2006 2006--endometrial ablation ×2 for menorrhagia/menorrhea.   • INCISION AND DRAINAGE PERIRECTAL ABSCESS N/A 9/2/2016 09/02/2016--incision and drainage of perirectal abscess.   • PARTIAL HYSTERECTOMY  10/2008    October 2008--partial hysterectomy for menorrhagia/menorrhea. Previously had endometrial ablation procedures 2. Pt states uterus was removed.   • RECTAL EXAMINATION UNDER ANESTHESIA N/A 6/1/2017 06/01/2017--colonoscopy revealed hyperplastic ×2.  Perianal fistulotomy.  External hemorrhoidectomy of one column.   • RECTAL FISTULOTOMY N/A 9/29/2017 09/29/2017--rectal fistulotomy.  Rectal exam under anesthesia.   • WRIST ARTHROPLASTY Right 07/15/2008    07/15/2008--right radial ulnar distal joint arthroplasty performed for non-traumatic arthritis/impingement syndrome.         No Known Allergies        Current Outpatient Medications:   •  B Complex Vitamins (VITAMIN B COMPLEX PO), Take 1 capsule by mouth Daily., Disp: , Rfl:   •  cetirizine (zyrTEC) 10 MG tablet, Take 10 mg by mouth Daily., Disp: , Rfl:   •  Cholecalciferol (VITAMIN D3) 5000 UNITS tablet, Take 1 tablet by mouth Daily., Disp: , Rfl:   •  gabapentin (NEURONTIN) 600 MG tablet, TAKE 1 TABLET BY MOUTH TWICE A DAY, Disp: 60 tablet, Rfl: 1  •  ibuprofen (ADVIL,MOTRIN) 200 MG tablet, Take 200 mg by mouth every 6 (six) hours as needed for mild pain (1-3)., Disp: , Rfl:   •  pravastatin (PRAVACHOL) 40 MG tablet, 1 BY MOUTH DAILY FOR HIGH CHOLESTEROL, Disp: 30 tablet, Rfl: 3      Family History   Problem Relation Age of Onset   • Diabetes Mother         Type 2   • Diabetes Maternal Grandmother         Type 2   • Malig Hyperthermia Neg Hx    • Breast cancer Neg Hx    • Ovarian cancer Neg Hx    • Colon cancer Neg Hx    • Deep vein thrombosis Neg Hx    • Pulmonary embolism Neg Hx          Social History     Socioeconomic History   •  "Marital status:      Spouse name: Not on file   • Number of children: 2   • Years of education: 12   • Highest education level: Not on file   Occupational History   • Occupation:    Social Needs   • Financial resource strain: Not very hard   • Food insecurity:     Worry: Never true     Inability: Never true   • Transportation needs:     Medical: No     Non-medical: No   Tobacco Use   • Smoking status: Current Every Day Smoker     Packs/day: 0.50     Years: 20.00     Pack years: 10.00     Types: Cigarettes   • Smokeless tobacco: Never Used   Substance and Sexual Activity   • Alcohol use: Yes     Alcohol/week: 0.6 oz     Types: 1 Glasses of wine per week     Frequency: 2-4 times a month     Drinks per session: 1 or 2     Binge frequency: Never     Comment: Occasional   • Drug use: No   • Sexual activity: Yes     Partners: Male     Birth control/protection: Surgical     Comment: [employer] [place] age 2007   Lifestyle   • Physical activity:     Days per week: 3 days     Minutes per session: 20 min   • Stress: Not at all   Relationships   • Social connections:     Talks on phone: Patient refused     Gets together: Patient refused     Attends [employer] service: Patient refused     Active member of club or organization: Patient refused     Attends meetings of clubs or organizations: Patient refused     Relationship status: Patient refused         Vitals:    06/12/19 0733   BP: 106/60   BP Location: Right arm   Patient Position: Sitting   Cuff Size: Large Adult   Pulse: 73   SpO2: 98%   Weight: 101 kg (221 lb 9.6 oz)   Height: 177.8 cm (70\")          Physical Exam:    General: Alert and oriented x 3, with appropriate affect; no acute distress.  HEENT: pupils equal, round, and reactive to light; extraocular movements intact; sclera nonicteric; nasal mucosa normal; pharynx normal; tympanic membranes and ear canals normal.  Neck: without JVD, thyromegaly, bruit, or adenopathy.  Lungs: clear to " auscultation in all fields.  Heart: auscultation reveals regular rate and rhythm without murmur, rub, gallop, or click.  Abdomen: is soft and nontender, without hepatosplenomegaly, mass or hernia. Normal bowel sounds.  GYN, Digital rectal exam, Breast; deferred to gynecologist.  Extremities: are without clubbing, cyanosis, or edema.  Vascular: no signs of peripheral arterial disease or venous insufficiency/varicosities.  Neurological: intact without focal deficit, including cranial and peripheral nerves.  Station and gait observed to be normal during ingress and egress from the examination area.  Sensation and deep tendon reflexes tested if clinically indicated and are normal.  Musculoskeletal: exam is normal, without signs of synovitis, significant degeneration or deformity. Skin examination: without rash or significant lesions.    Lab/other results:    NMR reveals a total cholesterol 165.  Triglycerides 95.  LDL particle number slightly elevated at 1244.  Small LDL particle number slightly elevated at 632.  HDL particle number is normal at 34.5.  CMP normal.  CBC normal.  Thyroid function test normal.  Vitamin D normal.  CPK normal.    Assessment/Plan:     Diagnosis Plan   1. Routine physical examination  CBC (No Diff)    CK    Comprehensive Metabolic Panel    NMR LipoProfile    Vitamin D 25 Hydroxy    Urinalysis With Microscopic If Indicated (No Culture) - Urine, Clean Catch    TSH    T4, Free    T3, Free   2. Hyperlipidemia, unspecified hyperlipidemia type  NMR LipoProfile   3. History of colon polyps, 11/15/2018--multiple hyperplastic.  11/22/2016-- adenoma ×2. 06/21/2016--tubular adenoma ×2.  Multiple hyperplastic.       4. Degeneration of intervertebral disc of mid-cervical region, unspecified spinal level     5. Multiple environmental allergies     6. Allergic rhinitis     7. Chronic right hip pain     8. Chronic bilateral low back pain with right-sided sciatica     9. Vitamin D deficiency  Vitamin  D 25 Hydroxy   10. Non morbid obesity     11. Therapeutic drug monitoring     12. Cigarette nicotine dependence without complication     13. Encounter for smoking cessation counseling       Patient presents with essentially normal annual physical except for the following issues: She has hyperlipidemia which is under good control.  She has a history of colon polyps and is up-to-date on her colonoscopy.  Her cervical disc disease as well as chronic lower back pain and chronic right hip pain is currently tolerable.  She takes ibuprofen as needed.  Environmental allergies/allergic rhinitis reasonably controlled.  She takes Zyrtec as needed.  Her vitamin D is therapeutic.  Patient has had a problem with her weight and we discussed weight loss.    June 12, 2019--Encounter for smoking cessation.  I advised the patient of the risk of continued use tobacco, specifically cigarettes.  I provided this patient with smoking cessation educational materials and discussed how to quit smoking including the use of medication such as nicotine patches or gum, Wellbutrin, and Chantix.  Patient is considering quitting smoking but is not quite ready to commit.  I informed the patient to please contact me should she decide to quit and we can come up with options including medications if necessary.  We spent 5 minutes discussing this issue.    Several preventative health issues discussed including review of vaccinations and recommendations, including dietary issues, exercise and weight loss.  Safe sex practices discussed.  Patient advised to wear seatbelt whenever driving and avoid texting and driving.  Also advised to look both ways before crossing the street.  Colon cancer prevention discussed and is up-to-date with colonoscopy.  Advised to avoid tobacco products and minimize alcohol consumption.     Plan is as follows: Recommend low carbohydrate diet, exercise, weight loss.  No change in current medical regimen.  Encourage patient to see  the gynecologist and keep up with her mammograms and Pap smears.  I will have her follow-up in 1 year with lab prior for her annual physical or follow-up as needed.    Procedures

## 2019-08-20 DIAGNOSIS — E78.5 HYPERLIPIDEMIA, UNSPECIFIED HYPERLIPIDEMIA TYPE: Chronic | ICD-10-CM

## 2019-08-20 RX ORDER — PRAVASTATIN SODIUM 40 MG
TABLET ORAL
Qty: 90 TABLET | Refills: 1 | Status: SHIPPED | OUTPATIENT
Start: 2019-08-20 | End: 2020-02-07

## 2019-08-21 RX ORDER — GABAPENTIN 600 MG/1
TABLET ORAL
Qty: 180 TABLET | Refills: 0 | Status: SHIPPED | OUTPATIENT
Start: 2019-08-21 | End: 2019-11-12 | Stop reason: SDUPTHER

## 2019-10-18 ENCOUNTER — OFFICE VISIT (OUTPATIENT)
Dept: ORTHOPEDIC SURGERY | Facility: CLINIC | Age: 54
End: 2019-10-18

## 2019-10-18 VITALS — HEIGHT: 71 IN | TEMPERATURE: 98.1 F | WEIGHT: 204.8 LBS | BODY MASS INDEX: 28.67 KG/M2

## 2019-10-18 DIAGNOSIS — S52.124A CLOSED NONDISPLACED FRACTURE OF HEAD OF RIGHT RADIUS, INITIAL ENCOUNTER: Primary | ICD-10-CM

## 2019-10-18 PROCEDURE — 99203 OFFICE O/P NEW LOW 30 MIN: CPT | Performed by: ORTHOPAEDIC SURGERY

## 2019-10-18 PROCEDURE — 24650 CLTX RDL HEAD/NCK FX WO MNPJ: CPT | Performed by: ORTHOPAEDIC SURGERY

## 2019-10-18 RX ORDER — MELOXICAM 15 MG/1
TABLET ORAL
Qty: 30 TABLET | Refills: 0 | Status: SHIPPED | OUTPATIENT
Start: 2019-10-18 | End: 2019-11-10 | Stop reason: SDUPTHER

## 2019-10-18 RX ORDER — HYDROCODONE BITARTRATE AND ACETAMINOPHEN 5; 325 MG/1; MG/1
1 TABLET ORAL EVERY 6 HOURS PRN
Qty: 40 TABLET | Refills: 0 | Status: SHIPPED | OUTPATIENT
Start: 2019-10-18 | End: 2020-06-23

## 2019-10-18 NOTE — PROGRESS NOTES
New Right Elbow Fracture       Patient: Rosemarie Gandhi        YOB: 1965      Chief Complaints: right elbow pain    History of Present Illness:This is a  54 y.o. female who presents complaining of right elbow pain she fell yesterday onto her right hand.  She does have a history of a distal radial ulnar joint replacement in 2008 by Dr. San.  She is pretty miserable in fact she is anterior she states her symptoms are moderate severe intermittent stabbing aching bruising swelling worse with any kind of motion.  Is currently only in a sling.  She would like to be put in a splint past medical history smart for high cholesterol  Chief Complaint   Patient presents with   • Right Elbow - Establish Care, Pain             Allergies: No Known Allergies    Medications:   Home Medications:  Current Outpatient Medications on File Prior to Visit   Medication Sig   • B Complex Vitamins (VITAMIN B COMPLEX PO) Take 1 capsule by mouth Daily.   • Cholecalciferol (VITAMIN D3) 5000 UNITS tablet Take 1 tablet by mouth Daily.   • gabapentin (NEURONTIN) 600 MG tablet TAKE 1 TABLET BY MOUTH TWICE A DAY   • ibuprofen (ADVIL,MOTRIN) 200 MG tablet Take 200 mg by mouth every 6 (six) hours as needed for mild pain (1-3).   • pravastatin (PRAVACHOL) 40 MG tablet 1 BY MOUTH DAILY FOR HIGH CHOLESTEROL     No current facility-administered medications on file prior to visit.      Current Medications:  Scheduled Meds:  Continuous Infusions:  No current facility-administered medications for this visit.   PRN Meds:.          Physical Exam: 54 y.o. female  General Appearance:    Alert, cooperative, in no acute distress                 There were no vitals filed for this visit.   Patient is alert and oriented ×3 no acute distress normal mood physical exam.  Physical exam of the elbow, incisions look good there is no erythema,no signs or sx of infection.    Ortho Exam  's exam the right elbow she has a small abrasion laterally she has loss  of extension about 5 to 8 degrees flexion is actually pretty good supination pronation about 70% of normal and pain  Xrays:  AP and lateral the right elbow were t evaluated to evaluate the complaints.  She does have a nondisplaced radial neck fracture and also some evidence of the tip of her distal radial ulnar joint replacement    Assessment / Plan .    Radial neck fracture explained to her this will heal fine we will limit her lifting pushing pulling now she did want me to put her in a splint wrapped in a posterior splint well-padded she has a sling I will see her back in 1 week with an x-ray out of the splint.  We will keep the splint and may be she will use that as a trough as needed she understands the most common complication following this injury is loss of extension

## 2019-10-18 NOTE — PROGRESS NOTES
New Right Knee & New Right Elbow      Patient: Rosemarie Gandhi        YOB: 1965    Medical Record Number: [id number]        Chief Complaints: right knee pain and right elbow pain      History of Present Illness: This is a         Allergies: No Known Allergies    Medications:   Home Medications:  Current Outpatient Medications on File Prior to Visit   Medication Sig   • B Complex Vitamins (VITAMIN B COMPLEX PO) Take 1 capsule by mouth Daily.   • Cholecalciferol (VITAMIN D3) 5000 UNITS tablet Take 1 tablet by mouth Daily.   • gabapentin (NEURONTIN) 600 MG tablet TAKE 1 TABLET BY MOUTH TWICE A DAY   • ibuprofen (ADVIL,MOTRIN) 200 MG tablet Take 200 mg by mouth every 6 (six) hours as needed for mild pain (1-3).   • pravastatin (PRAVACHOL) 40 MG tablet 1 BY MOUTH DAILY FOR HIGH CHOLESTEROL     No current facility-administered medications on file prior to visit.      Current Medications:  Scheduled Meds:  Continuous Infusions:  No current facility-administered medications for this visit.   PRN Meds:.    Past Medical History:   Diagnosis Date   • Abnormal weight gain 2/16/2017 05/01/2017--patient seen in follow-up and reports she stopped the phentermine and topiramate due to side effects, specifically constipation.  Current weight is 232 pounds which is the same as her last visit. She discontinued these medications and her symptoms resolved.  She restarted the medications and the symptoms returned.   03/14/2017--patient seen in follow-up and has lost 5 pounds.  She seems to be tolerating the medication well but does have a dry mouth.  I see no reason why we cannot continue the current regimen.  I will have her follow-up in about 5-6 weeks to reassess.  Current weight is 232 pounds.  02/16/2017--patient presents with a several year history of weight gain despite multiple attempts at various diet and exercise regimens.  She has failed low carbohydrate diet.  He has not been to Weight [employer]chers which in my  opinion is a farce.  She is asking about Saxenda which I think could be very helpful but I explained to her that it's extremely expensive and her insurance is likely not pay for   • Allergic rhinitis 11/2/2016    Patient has never had allergy testing.   • Chronic bilateral low back pain with right-sided sciatica 5/30/2018 08/08/2018--patient seen in follow-up and reports her symptoms are tolerable although she still has right-sided intermittent lumbar radicular symptoms.  Her back in itself is not particularly bothersome.  It is not particularly interfering with her daily activities.  We discussed options including referral back to neurosurgery versus consideration of physical therapy and perhaps some oral predniso   • Degeneration of intervertebral disc of mid-cervical region 12/23/2015 12/23/2015--MRI cervical spine reveals at C4-C5 there is mild spinal stenosis. There is moderate to severe bilateral foraminal narrowing. At C5-C6 there is mild left but no right foraminal narrowing. At C6-C7 there are surgical changes with hardware and this results in artifact. Evaluation for solid bony fusion cannot be done because of this. There is only minimal if any canal narrowing at C6-C7. There is mild right and mild to moderate left bony foraminal narrowing. C7-T1 reveals minimal right foraminal narrowing.    • Disc degeneration, lumbar 5/31/2018 06/07/2018--patient seen in follow-up and reports her hip pain is much better after starting prednisone.  She feels like symptoms are good enough that we can not proceed with any further evaluation or treatment at this time.  05/31/2018--x-ray of the right hip revealed no acute or chronic osseous or soft tissue abnormality.  X-ray of the lumbar spine reveals degenerative disc changes at L3-L4, L4-L5, and L5-S1.  Minor endplate osteophytic changes are noted at multiple levels.  No acute abnormality seen.  Diffuse facet arthritic changes at L4-L5 and L5-S1.  Recommend MRI  lumbar spine if clinically indicated.  Patient notified and started on prednisone 50 mg by mouth daily ×5 days, taper and discontinue.  05/30/2018--patient presents with approximately two-week history of right hip pain which seems to be located laterally and also radiates in towards the inguinal ligament region.  No known trauma.  The pain is worse with increasing weightbearing and also with sleeping on her right side.  No problems with si   • Diverticulosis of colon 6/21/2016 11/22/2016--colonoscopy revealed a polyp at the ileocecal valve and sigmoid colon.  There were 4 polyps at the rectosigmoid colon.  The single diverticulum in the sigmoid colon was not commented upon.  Pathology returned a sessile serrated adenoma at the ileocecal valve and the sigmoid colon.  The rectosigmoid polyp was hyperplastic.  06/21/2016--colonoscopy revealed a greater than 50 mm polyp at the ileocecal valve.  Pathology returned hyperplastic polyp.  A multilobulated 5 mm polyp was found in the transverse colon.  Pathology returned tubular adenoma with low grade dysplasia.  3 multilobulated polyps were found in the sigmoid colon.  Pathology revealed fragments of hyperplastic polyps.  2 multilobulated polyps were found in the rectosigmoid colon and pathology returned tubular adenoma with low-grade dysplasia.  A single small mouth diverticula and was found in the sigmoid.  Nonbleeding internal hemorrhoids.  General surgery recommended repeat colonoscopy in 3 months.   • Hallux valgus of left foot 4/4/2016 03/13/2015--patient was evaluated by the podiatrist and assessment was ingrown great toenail and paronychia. Partial nail avulsion completed.   03/13/2015--patient presents with a greater than one-year history of intermittent pain, swelling, redness, tenderness involving her left great toe. Examination reveals early hallux valgus deformity as well as a possible ingrown nail. Patient referred to podiatry. Uric acid and CRP obtained  to rule out possibility of gout and both were normal.   • Herniated lumbar intervertebral disc 5/31/2018 08/08/2018--patient seen in follow-up and reports her symptoms are tolerable although she still has right-sided intermittent lumbar radicular symptoms.  Her back in itself is not particularly bothersome.  It is not particularly interfering with her daily activities.  We discussed options including referral back to neurosurgery versus consideration of physical therapy and perhaps some oral predniso   • History of colon polyps, 11/15/2018--multiple hyperplastic.  11/22/2016-- adenoma ×2. 06/21/2016--tubular adenoma ×2.  Multiple hyperplastic.   6/21/2016    11/15/2018--colonoscopy revealed several small polyps at the ileocecal valve that were removed piecemeal.  #10, 1-2 mm polyps in the sigmoid colon and proximal descending colon.  Removed.  #1, 3 mm polyp in the distal descending colon.  Removed.  Nonbleeding internal hemorrhoids.  Pathology returned multiple hyperplastic polyps.  06/01/2017--colonoscopy revealed hyperplastic ×2.  Perianal fistulot   • History of Perirectal abscess 9/1/2016 09/12/2016--patient seen in follow-up for dressing change.  Patient is concerned about not being on an antibiotic and I explained to her that the definitive treatment is incision and drainage.  On exam the wound looks very good with no sign of purulent discharge.  She has a follow-up with general surgeon tomorrow.  09/02/2016--incision and drainage of left perirectal abscess by general surgery.  Cultures returned moderate growth of Klebsiella pneumoniae and heavy growth of Streptococcus agalactiae.   09/01/2016--patient presents with approximately 4 month history of a painful and swollen subcutaneous mass left buttock that is draining foul-smelling purulent material.  Examination confirms a large subcutaneous mass/abscess that I suspect is an infected sebaceous cyst.  General surgery referral given.   •  Hyperlipidemia 4/4/2016 03/18/2015--NMR reveals total cholesterol 216, triglycerides mildly elevated at 171. LDL particle number elevated at 1654. Small LDL particle number is normal at 446. HDL particle number normal at 33.4. Recommend diet and lifestyle changes.   • Multiple environmental allergies 11/2/2016    Patient has never had allergy testing.   • Non morbid obesity 4/4/2016 05/01/2017--patient seen in follow-up and reports she stopped the phentermine and topiramate due to side effects, specifically constipation.  Current weight is 232 pounds which is the same as her last visit.  She discontinued these medications and her symptoms resolved.  She restarted the medications and the symptoms returned.  03/14/2017--patient seen in follow-up and has lost 5 pounds.  She seems to be tolerating the medication well but does have a dry mouth.  I see no reason why we cannot continue the current regimen.  I will have her follow-up in about 5-6 weeks to reassess. Current weight is 232 pounds.  02/16/2017--patient presents with a several year history of weight gain despite multiple attempts at various diet and exercise regimens.  She has failed low carbohydrate diet.  He has not been to Weight Watchers which in my opinion is a farce.  She is asking about Saxenda which I think could be very helpful but I explained to her that it's extremely expensive and her insurance is likely not pay for i   • Vitamin D deficiency 4/4/2016        Past Surgical History:   Procedure Laterality Date   • CERVICAL FUSION  07/23/2012 07/23/2012--anterior cervical spinal fusion, C6--C7 with instrumentation.   • COLONOSCOPY N/A 6/21/2016 06/21/2016--colonoscopy revealed a greater than 50 mm polyp at the ileocecal valve.  Pathology returned hyperplastic polyp.  A multilobulated 5 mm polyp was found in the transverse colon.  Pathology returned tubular adenoma with low grade dysplasia.  3 multilobulated polyps were found in the sigmoid  colon.  Pathology revealed fragments of hyperplastic polyps.  2 multilobulated polyps were found in   • COLONOSCOPY N/A 11/22/2016 11/22/2016--colonoscopy revealed a polyp at the ileocecal valve and sigmoid colon.  There were 4 polyps at the rectosigmoid colon.  The single diverticulum in the sigmoid colon was not commented upon.  Pathology returned a sessile serrated adenoma at the ileocecal valve and the sigmoid colon.  The rectosigmoid polyp was hyperplastic.   • COLONOSCOPY N/A 11/15/2018    11/15/2018--colonoscopy revealed several small polyps at the ileocecal valve that were removed piecemeal.  #10, 1-2 mm polyps in the sigmoid colon and proximal descending colon.  Removed.  #1, 3 mm polyp in the distal descending colon.  Removed.  Nonbleeding internal hemorrhoids.  Pathology returned multiple hyperplastic polyps.   • COLONOSCOPY W/ POLYPECTOMY  06/01/2017 06/01/2017--colonoscopy revealed hyperplastic ×2.  Perianal fistulotomy.  External hemorrhoidectomy of one column.   • ENDOMETRIAL ABLATION  2006 2006--endometrial ablation ×2 for menorrhagia/menorrhea.   • INCISION AND DRAINAGE PERIRECTAL ABSCESS N/A 9/2/2016 09/02/2016--incision and drainage of perirectal abscess.   • PARTIAL HYSTERECTOMY  10/2008    October 2008--partial hysterectomy for menorrhagia/menorrhea. Previously had endometrial ablation procedures 2. Pt states uterus was removed.   • RECTAL EXAMINATION UNDER ANESTHESIA N/A 6/1/2017 06/01/2017--colonoscopy revealed hyperplastic ×2.  Perianal fistulotomy.  External hemorrhoidectomy of one column.   • RECTAL FISTULOTOMY N/A 9/29/2017 09/29/2017--rectal fistulotomy.  Rectal exam under anesthesia.   • WRIST ARTHROPLASTY Right 07/15/2008    07/15/2008--right radial ulnar distal joint arthroplasty performed for non-traumatic arthritis/impingement syndrome.        Social History     Occupational History   • Occupation:    Tobacco Use   • Smoking status: Current  Every Day Smoker     Packs/day: 0.50     Years: 20.00     Pack years: 10.00     Types: Cigarettes   • Smokeless tobacco: Never Used   Substance and Sexual Activity   • Alcohol use: Yes     Alcohol/week: 0.6 oz     Types: 1 Glasses of wine per week     Frequency: 2-4 times a month     Drinks per session: 1 or 2     Binge frequency: Never     Comment: Occasional   • Drug use: No   • Sexual activity: Yes     Partners: Male     Birth control/protection: Surgical     Comment: RIVERA wiht OC age 2007    Social History     Social History Narrative   • Not on file        Family History   Problem Relation Age of Onset   • Diabetes Mother         Type 2   • Diabetes Maternal Grandmother         Type 2   • Malig Hyperthermia Neg Hx    • Breast cancer Neg Hx    • Ovarian cancer Neg Hx    • Colon cancer Neg Hx    • Deep vein thrombosis Neg Hx    • Pulmonary embolism Neg Hx              Review of Systems: ***    Review of Systems      Physical Exam: 54 y.o. female  General Appearance:    Alert, cooperative, in no acute distress                 There were no vitals filed for this visit.   Patient is alert and read ×3 no acute distress appears her above-listed at height weight and age.  Affect is normal respiratory rate is normal unlabored. Heart rate regular rate rhythm, sclera, dentition and hearing are normal for the purpose of this exam.        Ortho Exam    Procedures             Radiology:   AP, Lateral and merchant views of the *** knee  were ordered/reviewed to evauateknee pain.   Imaging Results (most recent)     None        Assessment/Plan:

## 2019-10-25 ENCOUNTER — OFFICE VISIT (OUTPATIENT)
Dept: ORTHOPEDIC SURGERY | Facility: CLINIC | Age: 54
End: 2019-10-25

## 2019-10-25 VITALS — WEIGHT: 204.1 LBS | BODY MASS INDEX: 29.22 KG/M2 | TEMPERATURE: 98.2 F | HEIGHT: 70 IN

## 2019-10-25 DIAGNOSIS — S52.124A CLOSED NONDISPLACED FRACTURE OF HEAD OF RIGHT RADIUS, INITIAL ENCOUNTER: Primary | ICD-10-CM

## 2019-10-25 PROCEDURE — 99024 POSTOP FOLLOW-UP VISIT: CPT | Performed by: ORTHOPAEDIC SURGERY

## 2019-10-25 PROCEDURE — 73070 X-RAY EXAM OF ELBOW: CPT | Performed by: ORTHOPAEDIC SURGERY

## 2019-10-25 NOTE — PROGRESS NOTES
"Right Elbow Fracture follow Up      Patient: Rosemarie Gandhi        YOB: 1965  Is as above    Chief Complaints: right elbow pain      History of Present Illness: Pt is here f/u radial neck fracture is been in a splint and states she feels much better        Allergies: No Known Allergies    Medications:   Home Medications:  Current Outpatient Medications on File Prior to Visit   Medication Sig   • B Complex Vitamins (VITAMIN B COMPLEX PO) Take 1 capsule by mouth Daily.   • Cholecalciferol (VITAMIN D3) 5000 UNITS tablet Take 1 tablet by mouth Daily.   • gabapentin (NEURONTIN) 600 MG tablet TAKE 1 TABLET BY MOUTH TWICE A DAY   • HYDROcodone-acetaminophen (NORCO) 5-325 MG per tablet Take 1 tablet by mouth Every 6 (Six) Hours As Needed for Severe Pain .   • meloxicam (MOBIC) 15 MG tablet 1 PO Daily with food.   • pravastatin (PRAVACHOL) 40 MG tablet 1 BY MOUTH DAILY FOR HIGH CHOLESTEROL     No current facility-administered medications on file prior to visit.      Current Medications:  Scheduled Meds:  Continuous Infusions:  No current facility-administered medications for this visit.   PRN Meds:.          Physical Exam: 54 y.o. female  General Appearance:    Alert, cooperative, in no acute distress                 Vitals:    10/25/19 0808   Temp: 98.2 °F (36.8 °C)   TempSrc: Temporal   Weight: 92.6 kg (204 lb 1.6 oz)   Height: 177.8 cm (70\")      Patient is alert and oriented ×3 no acute distress normal mood physical exam.  Physical exam of the elbow, incisions look good there is no erythema,no signs or sx of infection.    Ortho Exam physical exam the right elbow no significant swelling she has loss of extension about 5 degrees flexion to 100 still painful pronation supination as I would expect    Assessment  S/P elbow ORIF.      Xrays:  AP and lateral the elbow were taken to evaluate the fracture and compared to previous films.  T she is a radial neck fracture that is nondisplaced evidence of prior surgery " distally at the wrist right radial neck fracture she is doing fine we will take her out of her splint I will give her the splint so she can use that at night if needed want her start working on active extension I will see her back in 3 weeks with an x-ray    Plan:

## 2019-11-11 RX ORDER — MELOXICAM 15 MG/1
TABLET ORAL
Qty: 30 TABLET | Refills: 0 | Status: SHIPPED | OUTPATIENT
Start: 2019-11-11 | End: 2019-12-08 | Stop reason: SDUPTHER

## 2019-11-12 RX ORDER — GABAPENTIN 600 MG/1
TABLET ORAL
Qty: 180 TABLET | Refills: 0 | Status: SHIPPED | OUTPATIENT
Start: 2019-11-12 | End: 2020-04-16

## 2019-11-15 ENCOUNTER — OFFICE VISIT (OUTPATIENT)
Dept: ORTHOPEDIC SURGERY | Facility: CLINIC | Age: 54
End: 2019-11-15

## 2019-11-15 VITALS — WEIGHT: 205.8 LBS | HEIGHT: 70 IN | TEMPERATURE: 98.6 F | BODY MASS INDEX: 29.46 KG/M2

## 2019-11-15 DIAGNOSIS — S52.124A CLOSED NONDISPLACED FRACTURE OF HEAD OF RIGHT RADIUS, INITIAL ENCOUNTER: Primary | ICD-10-CM

## 2019-11-15 PROCEDURE — 73070 X-RAY EXAM OF ELBOW: CPT | Performed by: ORTHOPAEDIC SURGERY

## 2019-11-15 PROCEDURE — 99024 POSTOP FOLLOW-UP VISIT: CPT | Performed by: ORTHOPAEDIC SURGERY

## 2019-11-15 NOTE — PROGRESS NOTES
"Right Elbow Fracture follow Up      Patient: Rosemarie Gandhi        YOB: 1965      Chief Complaints: right elbow pain      History of Present Illness: Pt is here f/u radial neck fracture she states she doing much better still missing some extension but overall doing much better        Allergies: No Known Allergies    Medications:   Home Medications:  Current Outpatient Medications on File Prior to Visit   Medication Sig   • B Complex Vitamins (VITAMIN B COMPLEX PO) Take 1 capsule by mouth Daily.   • Cholecalciferol (VITAMIN D3) 5000 UNITS tablet Take 1 tablet by mouth Daily.   • gabapentin (NEURONTIN) 600 MG tablet TAKE 1 TABLET BY MOUTH TWICE A DAY   • HYDROcodone-acetaminophen (NORCO) 5-325 MG per tablet Take 1 tablet by mouth Every 6 (Six) Hours As Needed for Severe Pain .   • pravastatin (PRAVACHOL) 40 MG tablet 1 BY MOUTH DAILY FOR HIGH CHOLESTEROL   • meloxicam (MOBIC) 15 MG tablet TAKE 1 TABLET BY MOUTH EVERY DAY WITH FOOD     No current facility-administered medications on file prior to visit.      Current Medications:  Scheduled Meds:  Continuous Infusions:  No current facility-administered medications for this visit.   PRN Meds:.          Physical Exam: 54 y.o. female  General Appearance:    Alert, cooperative, in no acute distress                 Vitals:    11/15/19 0800   Weight: 93.4 kg (205 lb 12.8 oz)   Height: 177.8 cm (70\")      Patient is alert and oriented ×3 no acute distress normal mood physical exam.  Physical exam of the elbow, incisions look good there is no erythema,no signs or sx of infection.    Ortho Exam for exam of the right elbow she has a minus about 5 degrees of terminal extension flexion is full her supination pronation she states her her baseline since her wrist surgery previously    Assessment  S/P radial neck fracture I think she is definitely improving I really want her to work on her extension still no lifting pushing or pulling I will have her schedule I more month " for follow-up she is doing fine she may cancel    Xrays:  AP and lateral the elbow were taken to evaluate the fracture and compared to previous films.  The fracture is well reduced and a good position appears to be healing  Plan:

## 2019-12-09 RX ORDER — MELOXICAM 15 MG/1
TABLET ORAL
Qty: 30 TABLET | Refills: 0 | Status: SHIPPED | OUTPATIENT
Start: 2019-12-09 | End: 2020-06-23

## 2020-02-07 DIAGNOSIS — E78.5 HYPERLIPIDEMIA, UNSPECIFIED HYPERLIPIDEMIA TYPE: Chronic | ICD-10-CM

## 2020-02-07 RX ORDER — PRAVASTATIN SODIUM 40 MG
TABLET ORAL
Qty: 90 TABLET | Refills: 1 | Status: SHIPPED | OUTPATIENT
Start: 2020-02-07 | End: 2020-06-23 | Stop reason: SDUPTHER

## 2020-04-15 DIAGNOSIS — M50.320 DEGENERATION OF INTERVERTEBRAL DISC OF MID-CERVICAL REGION, UNSPECIFIED SPINAL LEVEL: Primary | Chronic | ICD-10-CM

## 2020-04-16 RX ORDER — GABAPENTIN 600 MG/1
TABLET ORAL
Qty: 180 TABLET | Refills: 0 | Status: SHIPPED | OUTPATIENT
Start: 2020-04-16 | End: 2021-06-25

## 2020-06-11 ENCOUNTER — RESULTS ENCOUNTER (OUTPATIENT)
Dept: INTERNAL MEDICINE | Facility: CLINIC | Age: 55
End: 2020-06-11

## 2020-06-11 DIAGNOSIS — E55.9 VITAMIN D DEFICIENCY: Chronic | ICD-10-CM

## 2020-06-11 DIAGNOSIS — Z00.00 ROUTINE PHYSICAL EXAMINATION: ICD-10-CM

## 2020-06-11 DIAGNOSIS — E78.5 HYPERLIPIDEMIA, UNSPECIFIED HYPERLIPIDEMIA TYPE: Chronic | ICD-10-CM

## 2020-06-12 ENCOUNTER — RESULTS ENCOUNTER (OUTPATIENT)
Dept: INTERNAL MEDICINE | Facility: CLINIC | Age: 55
End: 2020-06-12

## 2020-06-12 ENCOUNTER — TELEPHONE (OUTPATIENT)
Dept: INTERNAL MEDICINE | Facility: CLINIC | Age: 55
End: 2020-06-12

## 2020-06-12 DIAGNOSIS — Z00.00 ROUTINE PHYSICAL EXAMINATION: ICD-10-CM

## 2020-06-16 ENCOUNTER — LAB (OUTPATIENT)
Dept: LAB | Facility: HOSPITAL | Age: 55
End: 2020-06-16

## 2020-06-16 LAB
25(OH)D3 SERPL-MCNC: 38.6 NG/ML (ref 30–100)
ALBUMIN SERPL-MCNC: 4.3 G/DL (ref 3.5–5.2)
ALBUMIN/GLOB SERPL: 1.9 G/DL
ALP SERPL-CCNC: 47 U/L (ref 39–117)
ALT SERPL W P-5'-P-CCNC: 15 U/L (ref 1–33)
ANION GAP SERPL CALCULATED.3IONS-SCNC: 7.7 MMOL/L (ref 5–15)
AST SERPL-CCNC: 16 U/L (ref 1–32)
BILIRUB SERPL-MCNC: 0.4 MG/DL (ref 0.2–1.2)
BILIRUB UR QL STRIP: NEGATIVE
BUN BLD-MCNC: 7 MG/DL (ref 6–20)
BUN/CREAT SERPL: 9.9 (ref 7–25)
CALCIUM SPEC-SCNC: 9.4 MG/DL (ref 8.6–10.5)
CHLORIDE SERPL-SCNC: 103 MMOL/L (ref 98–107)
CK SERPL-CCNC: 98 U/L (ref 20–180)
CLARITY UR: CLEAR
CO2 SERPL-SCNC: 26.3 MMOL/L (ref 22–29)
COLOR UR: YELLOW
CREAT BLD-MCNC: 0.71 MG/DL (ref 0.57–1)
DEPRECATED RDW RBC AUTO: 41.3 FL (ref 37–54)
ERYTHROCYTE [DISTWIDTH] IN BLOOD BY AUTOMATED COUNT: 12.6 % (ref 12.3–15.4)
GFR SERPL CREATININE-BSD FRML MDRD: 86 ML/MIN/1.73
GLOBULIN UR ELPH-MCNC: 2.3 GM/DL
GLUCOSE BLD-MCNC: 76 MG/DL (ref 65–99)
GLUCOSE UR STRIP-MCNC: NEGATIVE MG/DL
HCT VFR BLD AUTO: 41.3 % (ref 34–46.6)
HGB BLD-MCNC: 13.8 G/DL (ref 12–15.9)
HGB UR QL STRIP.AUTO: NEGATIVE
KETONES UR QL STRIP: NEGATIVE
LEUKOCYTE ESTERASE UR QL STRIP.AUTO: NEGATIVE
MCH RBC QN AUTO: 29.9 PG (ref 26.6–33)
MCHC RBC AUTO-ENTMCNC: 33.4 G/DL (ref 31.5–35.7)
MCV RBC AUTO: 89.6 FL (ref 79–97)
NITRITE UR QL STRIP: NEGATIVE
PH UR STRIP.AUTO: 7 [PH] (ref 5–8)
PLATELET # BLD AUTO: 225 10*3/MM3 (ref 140–450)
PMV BLD AUTO: 10.1 FL (ref 6–12)
POTASSIUM BLD-SCNC: 4.4 MMOL/L (ref 3.5–5.2)
PROT SERPL-MCNC: 6.6 G/DL (ref 6–8.5)
PROT UR QL STRIP: NEGATIVE
RBC # BLD AUTO: 4.61 10*6/MM3 (ref 3.77–5.28)
SODIUM BLD-SCNC: 137 MMOL/L (ref 136–145)
SP GR UR STRIP: 1.01 (ref 1–1.03)
T3FREE SERPL-MCNC: 3.27 PG/ML (ref 2–4.4)
T4 FREE SERPL-MCNC: 1.23 NG/DL (ref 0.93–1.7)
TSH SERPL DL<=0.05 MIU/L-ACNC: 2.95 UIU/ML (ref 0.27–4.2)
UROBILINOGEN UR QL STRIP: NORMAL
WBC NRBC COR # BLD: 10.3 10*3/MM3 (ref 3.4–10.8)

## 2020-06-16 PROCEDURE — 83704 LIPOPROTEIN BLD QUAN PART: CPT | Performed by: INTERNAL MEDICINE

## 2020-06-16 PROCEDURE — 82550 ASSAY OF CK (CPK): CPT | Performed by: INTERNAL MEDICINE

## 2020-06-16 PROCEDURE — 80053 COMPREHEN METABOLIC PANEL: CPT | Performed by: INTERNAL MEDICINE

## 2020-06-16 PROCEDURE — 84439 ASSAY OF FREE THYROXINE: CPT | Performed by: INTERNAL MEDICINE

## 2020-06-16 PROCEDURE — 84443 ASSAY THYROID STIM HORMONE: CPT | Performed by: INTERNAL MEDICINE

## 2020-06-16 PROCEDURE — 81003 URINALYSIS AUTO W/O SCOPE: CPT | Performed by: INTERNAL MEDICINE

## 2020-06-16 PROCEDURE — 84481 FREE ASSAY (FT-3): CPT | Performed by: INTERNAL MEDICINE

## 2020-06-16 PROCEDURE — 85027 COMPLETE CBC AUTOMATED: CPT | Performed by: INTERNAL MEDICINE

## 2020-06-16 PROCEDURE — 82306 VITAMIN D 25 HYDROXY: CPT | Performed by: INTERNAL MEDICINE

## 2020-06-16 PROCEDURE — 80061 LIPID PANEL: CPT | Performed by: INTERNAL MEDICINE

## 2020-06-16 PROCEDURE — 36415 COLL VENOUS BLD VENIPUNCTURE: CPT | Performed by: INTERNAL MEDICINE

## 2020-06-17 LAB
CHOLEST SERPL-MCNC: 156 MG/DL (ref 100–199)
HDL SERPL-SCNC: 40.4 UMOL/L
HDLC SERPL-MCNC: 56 MG/DL
LDL-P: 1106 NMOL/L
LDLC REAL SIZE PAT SERPL: 20.4 NM
LDLC SERPL CALC-MCNC: 73 MG/DL (ref 0–99)
SMALL LDL-P: 761 NMOL/L
TRIGL SERPL-MCNC: 136 MG/DL (ref 0–149)

## 2020-06-23 ENCOUNTER — OFFICE VISIT (OUTPATIENT)
Dept: INTERNAL MEDICINE | Facility: CLINIC | Age: 55
End: 2020-06-23

## 2020-06-23 VITALS
HEIGHT: 70 IN | HEART RATE: 65 BPM | WEIGHT: 203.4 LBS | BODY MASS INDEX: 29.12 KG/M2 | DIASTOLIC BLOOD PRESSURE: 68 MMHG | OXYGEN SATURATION: 99 % | SYSTOLIC BLOOD PRESSURE: 114 MMHG

## 2020-06-23 DIAGNOSIS — Z51.81 THERAPEUTIC DRUG MONITORING: ICD-10-CM

## 2020-06-23 DIAGNOSIS — J30.1 CHRONIC SEASONAL ALLERGIC RHINITIS DUE TO POLLEN: Chronic | ICD-10-CM

## 2020-06-23 DIAGNOSIS — E66.9 NON MORBID OBESITY: Chronic | ICD-10-CM

## 2020-06-23 DIAGNOSIS — E78.5 HYPERLIPIDEMIA, UNSPECIFIED HYPERLIPIDEMIA TYPE: Chronic | ICD-10-CM

## 2020-06-23 DIAGNOSIS — G89.29 CHRONIC BILATERAL LOW BACK PAIN WITH RIGHT-SIDED SCIATICA: Chronic | ICD-10-CM

## 2020-06-23 DIAGNOSIS — Z00.00 ROUTINE PHYSICAL EXAMINATION: Primary | ICD-10-CM

## 2020-06-23 DIAGNOSIS — E55.9 VITAMIN D DEFICIENCY: Chronic | ICD-10-CM

## 2020-06-23 DIAGNOSIS — E78.2 MIXED HYPERLIPIDEMIA: Chronic | ICD-10-CM

## 2020-06-23 DIAGNOSIS — M54.41 CHRONIC BILATERAL LOW BACK PAIN WITH RIGHT-SIDED SCIATICA: Chronic | ICD-10-CM

## 2020-06-23 DIAGNOSIS — Z12.39 BREAST CANCER SCREENING: ICD-10-CM

## 2020-06-23 DIAGNOSIS — Z86.010 HISTORY OF COLON POLYPS: Chronic | ICD-10-CM

## 2020-06-23 DIAGNOSIS — Z91.09 MULTIPLE ENVIRONMENTAL ALLERGIES: Chronic | ICD-10-CM

## 2020-06-23 PROCEDURE — 99396 PREV VISIT EST AGE 40-64: CPT | Performed by: INTERNAL MEDICINE

## 2020-06-23 RX ORDER — PRAVASTATIN SODIUM 40 MG
TABLET ORAL
Qty: 90 TABLET | Refills: 3 | Status: SHIPPED | OUTPATIENT
Start: 2020-06-23 | End: 2021-06-25 | Stop reason: SDUPTHER

## 2020-06-23 RX ORDER — CYCLOBENZAPRINE HCL 10 MG
TABLET ORAL
Qty: 60 TABLET | Refills: 3 | Status: SHIPPED | OUTPATIENT
Start: 2020-06-23

## 2020-06-23 NOTE — PROGRESS NOTES
06/23/2020    Patient Information  Rosemarie CHINCHILLA [address] [address]      1965  [unfilled]  There is no work phone number on file.    Chief Complaint:     Routine annual physical examination and follow-up lab work.  No new acute complaints.    History of Present Illness:    Patient with a history of hyperlipidemia, history of colon polyps, environmental allergies/allergic rhinitis, chronic low back pain, nonmorbid obesity and vitamin D deficiency.  She presents today for routine annual physical exam and follow-up lab work.  Her past medical history reviewed and updated were necessary including health maintenance parameters.  This reveals she needs a mammogram which I will order.    Review of Systems   Constitution: Negative.   HENT: Negative.    Eyes: Negative.    Cardiovascular: Negative.    Respiratory: Negative.    Endocrine: Negative.    Hematologic/Lymphatic: Negative.    Skin: Negative.    Musculoskeletal: Positive for arthritis, back pain, joint pain and neck pain.   Gastrointestinal: Negative.    Genitourinary: Negative.    Neurological: Negative.    Psychiatric/Behavioral: Negative.    Allergic/Immunologic: Negative.        Active Problems:    Patient Active Problem List   Diagnosis   • Degeneration of intervertebral disc of mid-cervical region   • Hallux valgus of left foot   • Hyperlipidemia   • Non morbid obesity   • Vitamin D deficiency   • Therapeutic drug monitoring   • Routine physical examination   • History of colon polyps, 11/15/2018--multiple hyperplastic.  11/22/2016-- adenoma ×2. 06/21/2016--tubular adenoma ×2.  Multiple hyperplastic.     • Diverticulosis of colon   • Multiple environmental allergies   • Allergic rhinitis   • Chronic right hip pain   • Chronic bilateral low back pain with right-sided sciatica         Past Medical History:   Diagnosis Date   •  Abnormal weight gain 2/16/2017 05/01/2017--patient seen in follow-up and reports she stopped the phentermine and topiramate due to side effects, specifically constipation.  Current weight is 232 pounds which is the same as her last visit. She discontinued these medications and her symptoms resolved.  She restarted the medications and the symptoms returned.   03/14/2017--patient seen in follow-up and has lost 5 pounds.  She seems to be tolerating the medication well but does have a dry mouth.  I see no reason why we cannot continue the current regimen.  I will have her follow-up in about 5-6 weeks to reassess.  Current weight is 232 pounds.  02/16/2017--patient presents with a several year history of weight gain despite multiple attempts at various diet and exercise regimens.  She has failed low carbohydrate diet.  He has not been to Weight Watchers which in my opinion is a farce.  She is asking about Saxenda which I think could be very helpful but I explained to her that it's extremely expensive and her insurance is likely not pay for   • Allergic rhinitis 11/2/2016    Patient has never had allergy testing.   • Chronic bilateral low back pain with right-sided sciatica 5/30/2018 08/08/2018--patient seen in follow-up and reports her symptoms are tolerable although she still has right-sided intermittent lumbar radicular symptoms.  Her back in itself is not particularly bothersome.  It is not particularly interfering with her daily activities.  We discussed options including referral back to neurosurgery versus consideration of physical therapy and perhaps some oral predniso   • Degeneration of intervertebral disc of mid-cervical region 12/23/2015 12/23/2015--MRI cervical spine reveals at C4-C5 there is mild spinal stenosis. There is moderate to severe bilateral foraminal narrowing. At C5-C6 there is mild left but no right foraminal narrowing. At C6-C7 there are surgical changes with hardware and this results  in artifact. Evaluation for solid bony fusion cannot be done because of this. There is only minimal if any canal narrowing at C6-C7. There is mild right and mild to moderate left bony foraminal narrowing. C7-T1 reveals minimal right foraminal narrowing.    • Disc degeneration, lumbar 5/31/2018 06/07/2018--patient seen in follow-up and reports her hip pain is much better after starting prednisone.  She feels like symptoms are good enough that we can not proceed with any further evaluation or treatment at this time.  05/31/2018--x-ray of the right hip revealed no acute or chronic osseous or soft tissue abnormality.  X-ray of the lumbar spine reveals degenerative disc changes at L3-L4, L4-L5, and L5-S1.  Minor endplate osteophytic changes are noted at multiple levels.  No acute abnormality seen.  Diffuse facet arthritic changes at L4-L5 and L5-S1.  Recommend MRI lumbar spine if clinically indicated.  Patient notified and started on prednisone 50 mg by mouth daily ×5 days, taper and discontinue.  05/30/2018--patient presents with approximately two-week history of right hip pain which seems to be located laterally and also radiates in towards the inguinal ligament region.  No known trauma.  The pain is worse with increasing weightbearing and also with sleeping on her right side.  No problems with si   • Diverticulosis of colon 6/21/2016 11/22/2016--colonoscopy revealed a polyp at the ileocecal valve and sigmoid colon.  There were 4 polyps at the rectosigmoid colon.  The single diverticulum in the sigmoid colon was not commented upon.  Pathology returned a sessile serrated adenoma at the ileocecal valve and the sigmoid colon.  The rectosigmoid polyp was hyperplastic.  06/21/2016--colonoscopy revealed a greater than 50 mm polyp at the ileocecal valve.  Pathology returned hyperplastic polyp.  A multilobulated 5 mm polyp was found in the transverse colon.  Pathology returned tubular adenoma with low grade dysplasia.  3  multilobulated polyps were found in the sigmoid colon.  Pathology revealed fragments of hyperplastic polyps.  2 multilobulated polyps were found in the rectosigmoid colon and pathology returned tubular adenoma with low-grade dysplasia.  A single small mouth diverticula and was found in the sigmoid.  Nonbleeding internal hemorrhoids.  General surgery recommended repeat colonoscopy in 3 months.   • Hallux valgus of left foot 4/4/2016 03/13/2015--patient was evaluated by the podiatrist and assessment was ingrown great toenail and paronychia. Partial nail avulsion completed.   03/13/2015--patient presents with a greater than one-year history of intermittent pain, swelling, redness, tenderness involving her left great toe. Examination reveals early hallux valgus deformity as well as a possible ingrown nail. Patient referred to podiatry. Uric acid and CRP obtained to rule out possibility of gout and both were normal.   • Herniated lumbar intervertebral disc 5/31/2018 08/08/2018--patient seen in follow-up and reports her symptoms are tolerable although she still has right-sided intermittent lumbar radicular symptoms.  Her back in itself is not particularly bothersome.  It is not particularly interfering with her daily activities.  We discussed options including referral back to neurosurgery versus consideration of physical therapy and perhaps some oral predniso   • History of colon polyps, 11/15/2018--multiple hyperplastic.  11/22/2016-- adenoma ×2. 06/21/2016--tubular adenoma ×2.  Multiple hyperplastic.   6/21/2016    11/15/2018--colonoscopy revealed several small polyps at the ileocecal valve that were removed piecemeal.  #10, 1-2 mm polyps in the sigmoid colon and proximal descending colon.  Removed.  #1, 3 mm polyp in the distal descending colon.  Removed.  Nonbleeding internal hemorrhoids.  Pathology returned multiple hyperplastic polyps.  06/01/2017--colonoscopy revealed hyperplastic ×2.  Perianal  fistulot   • History of Perirectal abscess 9/1/2016 09/12/2016--patient seen in follow-up for dressing change.  Patient is concerned about not being on an antibiotic and I explained to her that the definitive treatment is incision and drainage.  On exam the wound looks very good with no sign of purulent discharge.  She has a follow-up with general surgeon tomorrow.  09/02/2016--incision and drainage of left perirectal abscess by general surgery.  Cultures returned moderate growth of Klebsiella pneumoniae and heavy growth of Streptococcus agalactiae.   09/01/2016--patient presents with approximately 4 month history of a painful and swollen subcutaneous mass left buttock that is draining foul-smelling purulent material.  Examination confirms a large subcutaneous mass/abscess that I suspect is an infected sebaceous cyst.  General surgery referral given.   • Hyperlipidemia 4/4/2016 03/18/2015--NMR reveals total cholesterol 216, triglycerides mildly elevated at 171. LDL particle number elevated at 1654. Small LDL particle number is normal at 446. HDL particle number normal at 33.4. Recommend diet and lifestyle changes.   • Multiple environmental allergies 11/2/2016    Patient has never had allergy testing.   • Non morbid obesity 4/4/2016 05/01/2017--patient seen in follow-up and reports she stopped the phentermine and topiramate due to side effects, specifically constipation.  Current weight is 232 pounds which is the same as her last visit.  She discontinued these medications and her symptoms resolved.  She restarted the medications and the symptoms returned.  03/14/2017--patient seen in follow-up and has lost 5 pounds.  She seems to be tolerating the medication well but does have a dry mouth.  I see no reason why we cannot continue the current regimen.  I will have her follow-up in about 5-6 weeks to reassess. Current weight is 232 pounds.  02/16/2017--patient presents with a several year history of weight  gain despite multiple attempts at various diet and exercise regimens.  She has failed low carbohydrate diet.  He has not been to Weight Watchers which in my opinion is a farce.  She is asking about Saxenda which I think could be very helpful but I explained to her that it's extremely expensive and her insurance is likely not pay for i   • Vitamin D deficiency 4/4/2016         Past Surgical History:   Procedure Laterality Date   • CERVICAL FUSION  07/23/2012 07/23/2012--anterior cervical spinal fusion, C6--C7 with instrumentation.   • COLONOSCOPY N/A 6/21/2016 06/21/2016--colonoscopy revealed a greater than 50 mm polyp at the ileocecal valve.  Pathology returned hyperplastic polyp.  A multilobulated 5 mm polyp was found in the transverse colon.  Pathology returned tubular adenoma with low grade dysplasia.  3 multilobulated polyps were found in the sigmoid colon.  Pathology revealed fragments of hyperplastic polyps.  2 multilobulated polyps were found in   • COLONOSCOPY N/A 11/22/2016 11/22/2016--colonoscopy revealed a polyp at the ileocecal valve and sigmoid colon.  There were 4 polyps at the rectosigmoid colon.  The single diverticulum in the sigmoid colon was not commented upon.  Pathology returned a sessile serrated adenoma at the ileocecal valve and the sigmoid colon.  The rectosigmoid polyp was hyperplastic.   • COLONOSCOPY N/A 11/15/2018    11/15/2018--colonoscopy revealed several small polyps at the ileocecal valve that were removed piecemeal.  #10, 1-2 mm polyps in the sigmoid colon and proximal descending colon.  Removed.  #1, 3 mm polyp in the distal descending colon.  Removed.  Nonbleeding internal hemorrhoids.  Pathology returned multiple hyperplastic polyps.   • COLONOSCOPY W/ POLYPECTOMY  06/01/2017 06/01/2017--colonoscopy revealed hyperplastic ×2.  Perianal fistulotomy.  External hemorrhoidectomy of one column.   • ENDOMETRIAL ABLATION  2006 2006--endometrial ablation ×2 for  menorrhagia/menorrhea.   • INCISION AND DRAINAGE PERIRECTAL ABSCESS N/A 9/2/2016 09/02/2016--incision and drainage of perirectal abscess.   • RECTAL EXAMINATION UNDER ANESTHESIA N/A 6/1/2017 06/01/2017--colonoscopy revealed hyperplastic ×2.  Perianal fistulotomy.  External hemorrhoidectomy of one column.   • RECTAL FISTULOTOMY N/A 9/29/2017 09/29/2017--rectal fistulotomy.  Rectal exam under anesthesia.   • SUBTOTAL HYSTERECTOMY  10/2008    October 2008--partial hysterectomy for menorrhagia/menorrhea. Previously had endometrial ablation procedures 2. Pt states uterus was removed.   • WRIST ARTHROPLASTY Right 07/15/2008    07/15/2008--right radial ulnar distal joint arthroplasty performed for non-traumatic arthritis/impingement syndrome.         No Known Allergies        Current Outpatient Medications:   •  B Complex Vitamins (VITAMIN B COMPLEX PO), Take 1 capsule by mouth Daily., Disp: , Rfl:   •  Cholecalciferol (VITAMIN D3) 5000 UNITS tablet, Take 1 tablet by mouth Daily., Disp: , Rfl:   •  gabapentin (NEURONTIN) 600 MG tablet, TAKE 1 TABLET BY MOUTH TWICE A DAY, Disp: 180 tablet, Rfl: 0  •  pravastatin (PRAVACHOL) 40 MG tablet, Take 1 p.o. daily for high cholesterol, Disp: 90 tablet, Rfl: 3  •  cyclobenzaprine (FLEXERIL) 10 MG tablet, Take 1 p.o. 3 times daily as needed for muscle relaxer, Disp: 60 tablet, Rfl: 3      Family History   Problem Relation Age of Onset   • Diabetes Mother         Type 2   • Diabetes Maternal Grandmother         Type 2   • Malig Hyperthermia Neg Hx    • Breast cancer Neg Hx    • Ovarian cancer Neg Hx    • Colon cancer Neg Hx    • Deep vein thrombosis Neg Hx    • Pulmonary embolism Neg Hx          Social History     Socioeconomic History   • Marital status:      Spouse name: Not on file   • Number of children: 2   • Years of education: 12   • Highest education level: Not on file   Occupational History   • Occupation:    Social Needs   • Financial  "resource strain: Not very hard   • Food insecurity:     Worry: Never true     Inability: Never true   • Transportation needs:     Medical: No     Non-medical: No   Tobacco Use   • Smoking status: Current Every Day Smoker     Packs/day: 0.50     Years: 20.00     Pack years: 10.00     Types: Cigarettes   • Smokeless tobacco: Never Used   Substance and Sexual Activity   • Alcohol use: Yes     Alcohol/week: 1.0 standard drinks     Types: 1 Glasses of wine per week     Frequency: 2-4 times a month     Drinks per session: 1 or 2     Binge frequency: Never     Comment: Occasional   • Drug use: No   • Sexual activity: Yes     Partners: Male     Birth control/protection: Surgical     Comment: [employer] [place] age 2007   Lifestyle   • Physical activity:     Days per week: 3 days     Minutes per session: 20 min   • Stress: Not at all   Relationships   • Social connections:     Talks on phone: Patient refused     Gets together: Patient refused     Attends Jainism service: Patient refused     Active member of club or organization: Patient refused     Attends meetings of clubs or organizations: Patient refused     Relationship status: Patient refused         Vitals:    06/23/20 0801   BP: 114/68   Pulse: 65   SpO2: 99%   Weight: 92.3 kg (203 lb 6.4 oz)   Height: 177.8 cm (70\")        Body mass index is 29.18 kg/m².      Physical Exam:    General: Alert and oriented x 3.  No acute distress.  Normal affect.  Obese.  HEENT: Pupils equal, round, reactive to light; extraocular movements intact; sclerae nonicteric; pharynx, ear canals and TMs normal.  Neck: Without JVD, thyromegaly, bruit, or adenopathy.  Lungs: Clear to auscultation in all fields.  Heart: Regular rate and rhythm without murmur, rub, gallop, or click.  Abdomen: Soft, nontender, without hepatosplenomegaly or hernia.  Bowel sounds normal.  : Deferred.  Rectal: Deferred.  Extremities: Without clubbing, cyanosis, edema, or pulse deficit.  Neurologic: Intact without focal " deficit.  Normal station and gait observed during ingress and egress from the examination room.  Skin: Without significant lesion.  Musculoskeletal: Unremarkable.    Lab/other results:    NMR reveals total cholesterol 156.  Triglycerides 236.  LDL particle number elevated at 1106.  Small LDL particle number elevated 761.  HDL particle number is good at 40.4.  CMP normal.  CBC normal.  CPK normal.  Vitamin D normal.  Thyroid function test normal.  Urinalysis normal.    Assessment/Plan:     Diagnosis Plan   1. Routine physical examination  CBC (No Diff)    CK    Comprehensive Metabolic Panel    NMR LipoProfile    Vitamin D 25 Hydroxy    TSH    T4, Free    T3, Free   2. Hyperlipidemia  CK    Comprehensive Metabolic Panel    NMR LipoProfile    TSH    T4, Free    T3, Free    pravastatin (PRAVACHOL) 40 MG tablet   3. History of colon polyps, 11/15/2018--multiple hyperplastic.  11/22/2016-- adenoma ×2. 06/21/2016--tubular adenoma ×2.  Multiple hyperplastic.       4. Multiple environmental allergies     5. Allergic rhinitis     6. Chronic bilateral low back pain with right-sided sciatica  cyclobenzaprine (FLEXERIL) 10 MG tablet   7. Non morbid obesity     8. Vitamin D deficiency  Vitamin D 25 Hydroxy   9. Therapeutic drug monitoring  CBC (No Diff)   10. Breast cancer screening  Mammo screening digital tomosynthesis bilateral w CAD   11. Hyperlipidemia, unspecified hyperlipidemia type       Patient presents with essentially normal annual physical except for the following issues: She has hyperlipidemia which is under excellent control on the pravastatin.  She has a history of colon polyps and is up-to-date on her colonoscopy.  Environmental allergies/allergic rhinitis currently not a big issue.  She has chronic lower back pain as well as degenerative disc disease of the neck and is taking gabapentin for this.  This seems to be helpful.  She also has nonmorbid obesity and I encouraged her to work on weight loss.  Her  vitamin D is in the normal range.    Several preventative health issues discussed including review of vaccinations and recommendations, including dietary issues, exercise and weight loss.  Safe sex practices discussed.  Patient advised to wear seatbelt whenever driving and avoid texting and driving.  Also advised to look both ways before crossing the street.  Colon cancer prevention/screening discussed and is up-to-date with colonoscopy.  Advised to avoid tobacco products and minimize alcohol consumption.    Plan is as follows: No change in current medical regimen.  Mammogram ordered.  I will have patient follow-up in 1 year with lab prior for her annual exam.    Procedures

## 2020-08-03 ENCOUNTER — HOSPITAL ENCOUNTER (OUTPATIENT)
Dept: MAMMOGRAPHY | Facility: HOSPITAL | Age: 55
Discharge: HOME | End: 2020-08-03
Admitting: INTERNAL MEDICINE

## 2020-08-03 DIAGNOSIS — Z12.39 BREAST CANCER SCREENING: ICD-10-CM

## 2020-08-03 PROCEDURE — 77063 BREAST TOMOSYNTHESIS BI: CPT

## 2020-08-03 PROCEDURE — 77067 SCR MAMMO BI INCL CAD: CPT

## 2021-03-24 ENCOUNTER — BULK ORDERING (OUTPATIENT)
Dept: CASE MANAGEMENT | Facility: OTHER | Age: 56
End: 2021-03-24

## 2021-03-24 DIAGNOSIS — Z23 IMMUNIZATION DUE: ICD-10-CM

## 2021-06-17 ENCOUNTER — LAB (OUTPATIENT)
Dept: LAB | Facility: HOSPITAL | Age: 56
End: 2021-06-17

## 2021-06-17 DIAGNOSIS — E55.9 VITAMIN D DEFICIENCY: Chronic | ICD-10-CM

## 2021-06-17 DIAGNOSIS — Z00.00 ROUTINE PHYSICAL EXAMINATION: ICD-10-CM

## 2021-06-17 DIAGNOSIS — E78.2 MIXED HYPERLIPIDEMIA: Chronic | ICD-10-CM

## 2021-06-17 DIAGNOSIS — Z51.81 THERAPEUTIC DRUG MONITORING: ICD-10-CM

## 2021-06-17 LAB
25(OH)D3 SERPL-MCNC: 46.3 NG/ML (ref 30–100)
ALBUMIN SERPL-MCNC: 4.4 G/DL (ref 3.5–5.2)
ALBUMIN/GLOB SERPL: 2 G/DL
ALP SERPL-CCNC: 51 U/L (ref 39–117)
ALT SERPL W P-5'-P-CCNC: 8 U/L (ref 1–33)
ANION GAP SERPL CALCULATED.3IONS-SCNC: 12.6 MMOL/L (ref 5–15)
AST SERPL-CCNC: 13 U/L (ref 1–32)
BILIRUB SERPL-MCNC: 0.6 MG/DL (ref 0–1.2)
BUN SERPL-MCNC: 8 MG/DL (ref 6–20)
BUN/CREAT SERPL: 10.3 (ref 7–25)
CALCIUM SPEC-SCNC: 9.6 MG/DL (ref 8.6–10.5)
CHLORIDE SERPL-SCNC: 107 MMOL/L (ref 98–107)
CK SERPL-CCNC: 177 U/L (ref 20–180)
CO2 SERPL-SCNC: 25.4 MMOL/L (ref 22–29)
CREAT SERPL-MCNC: 0.78 MG/DL (ref 0.57–1)
DEPRECATED RDW RBC AUTO: 40.4 FL (ref 37–54)
ERYTHROCYTE [DISTWIDTH] IN BLOOD BY AUTOMATED COUNT: 12.3 % (ref 12.3–15.4)
GFR SERPL CREATININE-BSD FRML MDRD: 77 ML/MIN/1.73
GLOBULIN UR ELPH-MCNC: 2.2 GM/DL
GLUCOSE SERPL-MCNC: 83 MG/DL (ref 65–99)
HCT VFR BLD AUTO: 42.6 % (ref 34–46.6)
HGB BLD-MCNC: 14.4 G/DL (ref 12–15.9)
MCH RBC QN AUTO: 30.3 PG (ref 26.6–33)
MCHC RBC AUTO-ENTMCNC: 33.8 G/DL (ref 31.5–35.7)
MCV RBC AUTO: 89.7 FL (ref 79–97)
PLATELET # BLD AUTO: 223 10*3/MM3 (ref 140–450)
PMV BLD AUTO: 10.4 FL (ref 6–12)
POTASSIUM SERPL-SCNC: 4.6 MMOL/L (ref 3.5–5.2)
PROT SERPL-MCNC: 6.6 G/DL (ref 6–8.5)
RBC # BLD AUTO: 4.75 10*6/MM3 (ref 3.77–5.28)
SODIUM SERPL-SCNC: 145 MMOL/L (ref 136–145)
T3FREE SERPL-MCNC: 3.08 PG/ML (ref 2–4.4)
T4 FREE SERPL-MCNC: 1.38 NG/DL (ref 0.93–1.7)
TSH SERPL DL<=0.05 MIU/L-ACNC: 2.91 UIU/ML (ref 0.27–4.2)
WBC # BLD AUTO: 8.84 10*3/MM3 (ref 3.4–10.8)

## 2021-06-17 PROCEDURE — 80061 LIPID PANEL: CPT

## 2021-06-17 PROCEDURE — 84481 FREE ASSAY (FT-3): CPT

## 2021-06-17 PROCEDURE — 82550 ASSAY OF CK (CPK): CPT

## 2021-06-17 PROCEDURE — 84443 ASSAY THYROID STIM HORMONE: CPT

## 2021-06-17 PROCEDURE — 84439 ASSAY OF FREE THYROXINE: CPT

## 2021-06-17 PROCEDURE — 82306 VITAMIN D 25 HYDROXY: CPT

## 2021-06-17 PROCEDURE — 85027 COMPLETE CBC AUTOMATED: CPT

## 2021-06-17 PROCEDURE — 36415 COLL VENOUS BLD VENIPUNCTURE: CPT

## 2021-06-17 PROCEDURE — 83704 LIPOPROTEIN BLD QUAN PART: CPT

## 2021-06-17 PROCEDURE — 80053 COMPREHEN METABOLIC PANEL: CPT

## 2021-06-18 LAB
CHOLEST SERPL-MCNC: 168 MG/DL (ref 100–199)
HDL SERPL-SCNC: 41.2 UMOL/L
HDLC SERPL-MCNC: 62 MG/DL
LDL SERPL QN: 20.9 NM
LDL SERPL-SCNC: 1145 NMOL/L
LDL SMALL SERPL-SCNC: 528 NMOL/L
LDLC SERPL CALC-MCNC: 89 MG/DL (ref 0–99)
TRIGL SERPL-MCNC: 95 MG/DL (ref 0–149)

## 2021-06-25 ENCOUNTER — OFFICE VISIT (OUTPATIENT)
Dept: INTERNAL MEDICINE | Facility: CLINIC | Age: 56
End: 2021-06-25

## 2021-06-25 VITALS
WEIGHT: 199.8 LBS | HEART RATE: 79 BPM | DIASTOLIC BLOOD PRESSURE: 72 MMHG | BODY MASS INDEX: 28.67 KG/M2 | OXYGEN SATURATION: 98 % | SYSTOLIC BLOOD PRESSURE: 116 MMHG | RESPIRATION RATE: 16 BRPM

## 2021-06-25 DIAGNOSIS — Z00.00 ROUTINE PHYSICAL EXAMINATION: Primary | ICD-10-CM

## 2021-06-25 DIAGNOSIS — Z51.81 THERAPEUTIC DRUG MONITORING: ICD-10-CM

## 2021-06-25 DIAGNOSIS — G89.29 CHRONIC BILATERAL LOW BACK PAIN WITH RIGHT-SIDED SCIATICA: Chronic | ICD-10-CM

## 2021-06-25 DIAGNOSIS — M54.41 CHRONIC BILATERAL LOW BACK PAIN WITH RIGHT-SIDED SCIATICA: Chronic | ICD-10-CM

## 2021-06-25 DIAGNOSIS — E55.9 VITAMIN D DEFICIENCY: Chronic | ICD-10-CM

## 2021-06-25 DIAGNOSIS — Z91.09 MULTIPLE ENVIRONMENTAL ALLERGIES: Chronic | ICD-10-CM

## 2021-06-25 DIAGNOSIS — E78.2 MIXED HYPERLIPIDEMIA: Chronic | ICD-10-CM

## 2021-06-25 DIAGNOSIS — E66.9 NON MORBID OBESITY: Chronic | ICD-10-CM

## 2021-06-25 DIAGNOSIS — J30.1 CHRONIC SEASONAL ALLERGIC RHINITIS DUE TO POLLEN: Chronic | ICD-10-CM

## 2021-06-25 DIAGNOSIS — Z12.11 COLON CANCER SCREENING: ICD-10-CM

## 2021-06-25 DIAGNOSIS — Z86.010 HISTORY OF COLON POLYPS: Chronic | ICD-10-CM

## 2021-06-25 PROCEDURE — 99396 PREV VISIT EST AGE 40-64: CPT | Performed by: INTERNAL MEDICINE

## 2021-06-25 RX ORDER — PRAVASTATIN SODIUM 40 MG
TABLET ORAL
Qty: 90 TABLET | Refills: 3 | Status: SHIPPED | OUTPATIENT
Start: 2021-06-25 | End: 2022-07-11

## 2021-06-25 NOTE — PROGRESS NOTES
06/25/2021    Patient Information  Rosemarie CHINCHILLA [address] [address]      1965  [unfilled]  There is no work phone number on file.    Chief Complaint:     Routine physical examination and follow-up blood work in order to monitor chronic medical issues listed in history of present illness.  No new acute complaints.    History of Present Illness:  Patient with a history of hyperlipidemia,  History of colon polyps, vitamin D deficiency, nonmorbid obesity, environmental allergies/allergic rhinitis, chronic lower back pain.  She presents today for her routine annual physical exam and follow-up blood work in order to monitor chronic medical issues.  Her past medical history reviewed and updated were necessary including health maintenance parameters.  This reveals she needs pneumococcal vaccination and possibly Covid vaccine.  See below.  She also needs colonoscopy given her history of colon polyps.    Review of Systems   Constitutional: Negative.   HENT: Negative.    Eyes: Negative.    Cardiovascular: Negative.    Respiratory: Negative.    Endocrine: Negative.    Hematologic/Lymphatic: Negative.    Skin: Negative.    Musculoskeletal: Positive for arthritis and back pain.   Gastrointestinal: Negative.    Genitourinary: Negative.    Neurological: Negative.    Psychiatric/Behavioral: Negative.    Allergic/Immunologic: Negative.        Active Problems:    Patient Active Problem List   Diagnosis   • Degeneration of intervertebral disc of mid-cervical region   • Hallux valgus of left foot   • Hyperlipidemia   • Non morbid obesity   • Vitamin D deficiency   • Therapeutic drug monitoring   • Routine physical examination   • History of colon polyps, 11/15/2018--multiple hyperplastic.  11/22/2016-- adenoma ×2. 06/21/2016--tubular adenoma ×2.  Multiple hyperplastic.     • Diverticulosis of colon   •  Multiple environmental allergies   • Allergic rhinitis   • Chronic right hip pain   • Chronic bilateral low back pain with right-sided sciatica         Past Medical History:   Diagnosis Date   • Abnormal weight gain 2/16/2017 05/01/2017--patient seen in follow-up and reports she stopped the phentermine and topiramate due to side effects, specifically constipation.  Current weight is 232 pounds which is the same as her last visit. She discontinued these medications and her symptoms resolved.  She restarted the medications and the symptoms returned.   03/14/2017--patient seen in follow-up and has lost 5 pounds.  She seems to be tolerating the medication well but does have a dry mouth.  I see no reason why we cannot continue the current regimen.  I will have her follow-up in about 5-6 weeks to reassess.  Current weight is 232 pounds.  02/16/2017--patient presents with a several year history of weight gain despite multiple attempts at various diet and exercise regimens.  She has failed low carbohydrate diet.  He has not been to Weight Watchers which in my opinion is a farce.  She is asking about Saxenda which I think could be very helpful but I explained to her that it's extremely expensive and her insurance is likely not pay for   • Allergic rhinitis 11/2/2016    Patient has never had allergy testing.   • Chronic bilateral low back pain with right-sided sciatica 5/30/2018 08/08/2018--patient seen in follow-up and reports her symptoms are tolerable although she still has right-sided intermittent lumbar radicular symptoms.  Her back in itself is not particularly bothersome.  It is not particularly interfering with her daily activities.  We discussed options including referral back to neurosurgery versus consideration of physical therapy and perhaps some oral predniso   • Degeneration of intervertebral disc of mid-cervical region 12/23/2015 12/23/2015--MRI cervical spine reveals at C4-C5 there is mild spinal  stenosis. There is moderate to severe bilateral foraminal narrowing. At C5-C6 there is mild left but no right foraminal narrowing. At C6-C7 there are surgical changes with hardware and this results in artifact. Evaluation for solid bony fusion cannot be done because of this. There is only minimal if any canal narrowing at C6-C7. There is mild right and mild to moderate left bony foraminal narrowing. C7-T1 reveals minimal right foraminal narrowing.    • Disc degeneration, lumbar 5/31/2018 06/07/2018--patient seen in follow-up and reports her hip pain is much better after starting prednisone.  She feels like symptoms are good enough that we can not proceed with any further evaluation or treatment at this time.  05/31/2018--x-ray of the right hip revealed no acute or chronic osseous or soft tissue abnormality.  X-ray of the lumbar spine reveals degenerative disc changes at L3-L4, L4-L5, and L5-S1.  Minor endplate osteophytic changes are noted at multiple levels.  No acute abnormality seen.  Diffuse facet arthritic changes at L4-L5 and L5-S1.  Recommend MRI lumbar spine if clinically indicated.  Patient notified and started on prednisone 50 mg by mouth daily ×5 days, taper and discontinue.  05/30/2018--patient presents with approximately two-week history of right hip pain which seems to be located laterally and also radiates in towards the inguinal ligament region.  No known trauma.  The pain is worse with increasing weightbearing and also with sleeping on her right side.  No problems with si   • Diverticulosis of colon 6/21/2016 11/22/2016--colonoscopy revealed a polyp at the ileocecal valve and sigmoid colon.  There were 4 polyps at the rectosigmoid colon.  The single diverticulum in the sigmoid colon was not commented upon.  Pathology returned a sessile serrated adenoma at the ileocecal valve and the sigmoid colon.  The rectosigmoid polyp was hyperplastic.  06/21/2016--colonoscopy revealed a greater than 50 mm  polyp at the ileocecal valve.  Pathology returned hyperplastic polyp.  A multilobulated 5 mm polyp was found in the transverse colon.  Pathology returned tubular adenoma with low grade dysplasia.  3 multilobulated polyps were found in the sigmoid colon.  Pathology revealed fragments of hyperplastic polyps.  2 multilobulated polyps were found in the rectosigmoid colon and pathology returned tubular adenoma with low-grade dysplasia.  A single small mouth diverticula and was found in the sigmoid.  Nonbleeding internal hemorrhoids.  General surgery recommended repeat colonoscopy in 3 months.   • Hallux valgus of left foot 4/4/2016 03/13/2015--patient was evaluated by the podiatrist and assessment was ingrown great toenail and paronychia. Partial nail avulsion completed.   03/13/2015--patient presents with a greater than one-year history of intermittent pain, swelling, redness, tenderness involving her left great toe. Examination reveals early hallux valgus deformity as well as a possible ingrown nail. Patient referred to podiatry. Uric acid and CRP obtained to rule out possibility of gout and both were normal.   • Herniated lumbar intervertebral disc 5/31/2018 08/08/2018--patient seen in follow-up and reports her symptoms are tolerable although she still has right-sided intermittent lumbar radicular symptoms.  Her back in itself is not particularly bothersome.  It is not particularly interfering with her daily activities.  We discussed options including referral back to neurosurgery versus consideration of physical therapy and perhaps some oral predniso   • History of colon polyps, 11/15/2018--multiple hyperplastic.  11/22/2016-- adenoma ×2. 06/21/2016--tubular adenoma ×2.  Multiple hyperplastic.   6/21/2016    11/15/2018--colonoscopy revealed several small polyps at the ileocecal valve that were removed piecemeal.  #10, 1-2 mm polyps in the sigmoid colon and proximal descending colon.  Removed.  #1, 3 mm  polyp in the distal descending colon.  Removed.  Nonbleeding internal hemorrhoids.  Pathology returned multiple hyperplastic polyps.  06/01/2017--colonoscopy revealed hyperplastic ×2.  Perianal fistulot   • History of Perirectal abscess 9/1/2016 09/12/2016--patient seen in follow-up for dressing change.  Patient is concerned about not being on an antibiotic and I explained to her that the definitive treatment is incision and drainage.  On exam the wound looks very good with no sign of purulent discharge.  She has a follow-up with general surgeon tomorrow.  09/02/2016--incision and drainage of left perirectal abscess by general surgery.  Cultures returned moderate growth of Klebsiella pneumoniae and heavy growth of Streptococcus agalactiae.   09/01/2016--patient presents with approximately 4 month history of a painful and swollen subcutaneous mass left buttock that is draining foul-smelling purulent material.  Examination confirms a large subcutaneous mass/abscess that I suspect is an infected sebaceous cyst.  General surgery referral given.   • Hyperlipidemia 4/4/2016 03/18/2015--NMR reveals total cholesterol 216, triglycerides mildly elevated at 171. LDL particle number elevated at 1654. Small LDL particle number is normal at 446. HDL particle number normal at 33.4. Recommend diet and lifestyle changes.   • Multiple environmental allergies 11/2/2016    Patient has never had allergy testing.   • Non morbid obesity 4/4/2016 05/01/2017--patient seen in follow-up and reports she stopped the phentermine and topiramate due to side effects, specifically constipation.  Current weight is 232 pounds which is the same as her last visit.  She discontinued these medications and her symptoms resolved.  She restarted the medications and the symptoms returned.  03/14/2017--patient seen in follow-up and has lost 5 pounds.  She seems to be tolerating the medication well but does have a dry mouth.  I see no reason why we  cannot continue the current regimen.  I will have her follow-up in about 5-6 weeks to reassess. Current weight is 232 pounds.  02/16/2017--patient presents with a several year history of weight gain despite multiple attempts at various diet and exercise regimens.  She has failed low carbohydrate diet.  He has not been to Weight Watchers which in my opinion is a farce.  She is asking about Saxenda which I think could be very helpful but I explained to her that it's extremely expensive and her insurance is likely not pay for i   • Vitamin D deficiency 4/4/2016         Past Surgical History:   Procedure Laterality Date   • CERVICAL FUSION  07/23/2012 07/23/2012--anterior cervical spinal fusion, C6--C7 with instrumentation.   • COLONOSCOPY N/A 6/21/2016 06/21/2016--colonoscopy revealed a greater than 50 mm polyp at the ileocecal valve.  Pathology returned hyperplastic polyp.  A multilobulated 5 mm polyp was found in the transverse colon.  Pathology returned tubular adenoma with low grade dysplasia.  3 multilobulated polyps were found in the sigmoid colon.  Pathology revealed fragments of hyperplastic polyps.  2 multilobulated polyps were found in   • COLONOSCOPY N/A 11/22/2016 11/22/2016--colonoscopy revealed a polyp at the ileocecal valve and sigmoid colon.  There were 4 polyps at the rectosigmoid colon.  The single diverticulum in the sigmoid colon was not commented upon.  Pathology returned a sessile serrated adenoma at the ileocecal valve and the sigmoid colon.  The rectosigmoid polyp was hyperplastic.   • COLONOSCOPY N/A 11/15/2018    11/15/2018--colonoscopy revealed several small polyps at the ileocecal valve that were removed piecemeal.  #10, 1-2 mm polyps in the sigmoid colon and proximal descending colon.  Removed.  #1, 3 mm polyp in the distal descending colon.  Removed.  Nonbleeding internal hemorrhoids.  Pathology returned multiple hyperplastic polyps.   • COLONOSCOPY W/ POLYPECTOMY  06/01/2017     06/01/2017--colonoscopy revealed hyperplastic ×2.  Perianal fistulotomy.  External hemorrhoidectomy of one column.   • ENDOMETRIAL ABLATION  2006 2006--endometrial ablation ×2 for menorrhagia/menorrhea.   • INCISION AND DRAINAGE PERIRECTAL ABSCESS N/A 9/2/2016 09/02/2016--incision and drainage of perirectal abscess.   • RECTAL EXAMINATION UNDER ANESTHESIA N/A 6/1/2017 06/01/2017--colonoscopy revealed hyperplastic ×2.  Perianal fistulotomy.  External hemorrhoidectomy of one column.   • RECTAL FISTULOTOMY N/A 9/29/2017 09/29/2017--rectal fistulotomy.  Rectal exam under anesthesia.   • SUBTOTAL HYSTERECTOMY  10/2008    October 2008--partial hysterectomy for menorrhagia/menorrhea. Previously had endometrial ablation procedures 2. Pt states uterus was removed.   • WRIST ARTHROPLASTY Right 07/15/2008    07/15/2008--right radial ulnar distal joint arthroplasty performed for non-traumatic arthritis/impingement syndrome.         No Known Allergies        Current Outpatient Medications:   •  B Complex Vitamins (VITAMIN B COMPLEX PO), Take 1 capsule by mouth Daily., Disp: , Rfl:   •  Cholecalciferol (VITAMIN D3) 5000 UNITS tablet, Take 1 tablet by mouth Daily., Disp: , Rfl:   •  pravastatin (PRAVACHOL) 40 MG tablet, Take 1 p.o. daily for high cholesterol, Disp: 90 tablet, Rfl: 3  •  cyclobenzaprine (FLEXERIL) 10 MG tablet, Take 1 p.o. 3 times daily as needed for muscle relaxer, Disp: 60 tablet, Rfl: 3      Family History   Problem Relation Age of Onset   • Diabetes Mother         Type 2   • Diabetes Maternal Grandmother         Type 2   • Malig Hyperthermia Neg Hx    • Breast cancer Neg Hx    • Ovarian cancer Neg Hx    • Colon cancer Neg Hx    • Deep vein thrombosis Neg Hx    • Pulmonary embolism Neg Hx          Social History     Socioeconomic History   • Marital status:      Spouse name: Not on file   • Number of children: 2   • Years of education: 12   • Highest education level: Not on file   Tobacco Use    • Smoking status: Current Every Day Smoker     Packs/day: 0.50     Years: 20.00     Pack years: 10.00     Types: Cigarettes   • Smokeless tobacco: Never Used   Vaping Use   • Vaping Use: Never used   Substance and Sexual Activity   • Alcohol use: Yes     Alcohol/week: 1.0 standard drinks     Types: 1 Glasses of wine per week     Comment: Occasional   • Drug use: No   • Sexual activity: Yes     Partners: Male     Birth control/protection: Surgical     Comment: RIVERA thornton [employer] age 2007         Vitals:    06/25/21 1236   BP: 116/72   Pulse: 79   Resp: 16   SpO2: 98%   Weight: 90.6 kg (199 lb 12.8 oz)        Body mass index is 28.67 kg/m².      Physical Exam:    General: Alert and oriented x 3.  No acute distress.  Normal affect.  Obese.  HEENT: Pupils equal, round, reactive to light; extraocular movements intact; sclerae nonicteric; pharynx, ear canals and TMs normal.  Neck: Without JVD, thyromegaly, bruit, or adenopathy.  Lungs: Clear to auscultation in all fields.  Heart: Regular rate and rhythm without murmur, rub, gallop, or click.  Abdomen: Soft, nontender, without hepatosplenomegaly or hernia.  Bowel sounds normal.  : Deferred.  Rectal: Deferred.  Extremities: Without clubbing, cyanosis, edema, or pulse deficit.  Neurologic: Intact without focal deficit.  Normal station and gait observed during ingress and egress from the examination room.  Skin: Without significant lesion.  Musculoskeletal: Unremarkable.    Lab/other results:    NMR reveals a total cholesterol 168.  Triglycerides 95.  LDL particle number excellent at 1145.  Small LDL particle number slightly elevated at 528.  HDL particle number good at 41.2.  CMP is normal.  Vitamin D normal at 46.3.  Thyroid function tests are normal.  CPK normal.  CBC normal.    Assessment/Plan:     Diagnosis Plan   1. Routine physical examination  CBC (No Diff)    CK    Comprehensive Metabolic Panel    NMR LipoProfile    Vitamin D 25 Hydroxy    Urinalysis With Microscopic If  Indicated (No Culture) - Urine, Clean Catch    TSH    T4, Free    T3, Free   2. Hyperlipidemia  CK    Comprehensive Metabolic Panel    NMR LipoProfile    TSH    T4, Free    T3, Free    pravastatin (PRAVACHOL) 40 MG tablet   3. History of colon polyps, 11/15/2018--multiple hyperplastic.  11/22/2016-- adenoma ×2. 06/21/2016--tubular adenoma ×2.  Multiple hyperplastic.    Ambulatory Referral For Screening Colonoscopy   4. Vitamin D deficiency  Vitamin D 25 Hydroxy   5. Non morbid obesity     6. Multiple environmental allergies     7. Allergic rhinitis     8. Chronic bilateral low back pain with right-sided sciatica     9. Therapeutic drug monitoring  CBC (No Diff)    Urinalysis With Microscopic If Indicated (No Culture) - Urine, Clean Catch   10. Colon cancer screening  Ambulatory Referral For Screening Colonoscopy     Patient presents with essentially normal annual physical except for the following issues: She has hyperlipidemia which is under excellent control with pravastatin.  She has a history of colon polyps and needs a colonoscopy which I will order.  Vitamin D is in the normal range which is important given her postmenopausal state.  She has nonmorbid obesity and I have strongly encouraged her to follow a low carbohydrate diet, exercise, and lose weight.  Her environmental allergies currently not a big issue.  She does have problems with chronic lower back pain and takes Neurontin for this which is helpful but also occasionally takes a muscle relaxer.    Plan is as follows: Colonoscopy ordered.  Patient prefers not to do Covid vaccine or pneumococcal vaccine.  Patient will follow-up in 1 year with lab prior for her annual or follow-up as needed.        Procedures

## 2022-03-24 ENCOUNTER — PREP FOR SURGERY (OUTPATIENT)
Dept: OTHER | Facility: HOSPITAL | Age: 57
End: 2022-03-24

## 2022-03-24 DIAGNOSIS — Z86.010 HISTORY OF ADENOMATOUS POLYP OF COLON: Primary | ICD-10-CM

## 2022-04-18 RX ORDER — ASCORBIC ACID 500 MG
500 TABLET ORAL DAILY
COMMUNITY

## 2022-04-18 RX ORDER — MULTIPLE VITAMINS W/ MINERALS TAB 9MG-400MCG
1 TAB ORAL DAILY
COMMUNITY

## 2022-04-19 ENCOUNTER — ANESTHESIA EVENT (OUTPATIENT)
Dept: GASTROENTEROLOGY | Facility: HOSPITAL | Age: 57
End: 2022-04-19

## 2022-04-19 ENCOUNTER — ANESTHESIA (OUTPATIENT)
Dept: GASTROENTEROLOGY | Facility: HOSPITAL | Age: 57
End: 2022-04-19

## 2022-04-19 ENCOUNTER — HOSPITAL ENCOUNTER (OUTPATIENT)
Facility: HOSPITAL | Age: 57
Setting detail: HOSPITAL OUTPATIENT SURGERY
Discharge: HOME | End: 2022-04-19
Attending: SURGERY | Admitting: SURGERY

## 2022-04-19 VITALS
DIASTOLIC BLOOD PRESSURE: 70 MMHG | OXYGEN SATURATION: 98 % | HEART RATE: 88 BPM | SYSTOLIC BLOOD PRESSURE: 125 MMHG | HEIGHT: 70 IN | BODY MASS INDEX: 28.49 KG/M2 | WEIGHT: 199 LBS | RESPIRATION RATE: 18 BRPM

## 2022-04-19 DIAGNOSIS — Z86.010 HISTORY OF ADENOMATOUS POLYP OF COLON: ICD-10-CM

## 2022-04-19 PROBLEM — K57.90 DIVERTICULOSIS: Status: ACTIVE | Noted: 2022-04-19

## 2022-04-19 PROBLEM — K64.8 INTERNAL HEMORRHOIDS: Status: ACTIVE | Noted: 2022-04-19

## 2022-04-19 PROCEDURE — 25010000002 PROPOFOL 10 MG/ML EMULSION: Performed by: ANESTHESIOLOGY

## 2022-04-19 PROCEDURE — 45385 COLONOSCOPY W/LESION REMOVAL: CPT | Performed by: SURGERY

## 2022-04-19 PROCEDURE — 45380 COLONOSCOPY AND BIOPSY: CPT | Performed by: SURGERY

## 2022-04-19 PROCEDURE — S0260 H&P FOR SURGERY: HCPCS | Performed by: SURGERY

## 2022-04-19 PROCEDURE — 88305 TISSUE EXAM BY PATHOLOGIST: CPT | Performed by: SURGERY

## 2022-04-19 RX ORDER — SODIUM CHLORIDE, SODIUM LACTATE, POTASSIUM CHLORIDE, CALCIUM CHLORIDE 600; 310; 30; 20 MG/100ML; MG/100ML; MG/100ML; MG/100ML
30 INJECTION, SOLUTION INTRAVENOUS CONTINUOUS PRN
Status: DISCONTINUED | OUTPATIENT
Start: 2022-04-19 | End: 2022-04-19 | Stop reason: HOSPADM

## 2022-04-19 RX ORDER — PROPOFOL 10 MG/ML
VIAL (ML) INTRAVENOUS CONTINUOUS PRN
Status: DISCONTINUED | OUTPATIENT
Start: 2022-04-19 | End: 2022-04-19 | Stop reason: SURG

## 2022-04-19 RX ORDER — LIDOCAINE HYDROCHLORIDE 20 MG/ML
INJECTION, SOLUTION INFILTRATION; PERINEURAL AS NEEDED
Status: DISCONTINUED | OUTPATIENT
Start: 2022-04-19 | End: 2022-04-19 | Stop reason: SURG

## 2022-04-19 RX ADMIN — SODIUM CHLORIDE, POTASSIUM CHLORIDE, SODIUM LACTATE AND CALCIUM CHLORIDE 30 ML/HR: 600; 310; 30; 20 INJECTION, SOLUTION INTRAVENOUS at 07:53

## 2022-04-19 RX ADMIN — LIDOCAINE HYDROCHLORIDE 60 MG: 20 INJECTION, SOLUTION INFILTRATION; PERINEURAL at 08:39

## 2022-04-19 RX ADMIN — PROPOFOL 200 MCG/KG/MIN: 10 INJECTION, EMULSION INTRAVENOUS at 08:38

## 2022-04-19 NOTE — H&P
Reason for Visit: Surveillance colonoscopy    HPI:  Patient presents for evaluation for colon cancer surveillance.  There is no family history of colon neoplasia. No family hx of gastric, ovarian, or uterine cancer.  Patient denies changes in bowel habits, diarrhea, constipation, abdominal pain, decreased caliber of stool, melena, brbpr and weight loss.  There is no personal or family history of bleeding disorder or untoward reaction to anesthesia.  Patient has had a colonoscopy 4 year(s) ago.      Past Medical History:   Diagnosis Date   • Abnormal weight gain 02/16/2017 05/01/2017--patient seen in follow-up and reports she stopped the phentermine and topiramate due to side effects, specifically constipation.  Current weight is 232 pounds which is the same as her last visit. She discontinued these medications and her symptoms resolved.  She restarted the medications and the symptoms returned.   03/14/2017--patient seen in follow-up and has lost 5 pounds.  She seems to be tolerating the medication well but does have a dry mouth.  I see no reason why we cannot continue the current regimen.  I will have her follow-up in about 5-6 weeks to reassess.  Current weight is 232 pounds.  02/16/2017--patient presents with a several year history of weight gain despite multiple attempts at various diet and exercise regimens.  She has failed low carbohydrate diet.  He has not been to Weight Watchers which in my opinion is a farce.  She is asking about Saxenda which I think could be very helpful but I explained to her that it's extremely expensive and her insurance is likely not pay for   • Allergic rhinitis 11/02/2016    Patient has never had allergy testing.   • Chronic bilateral low back pain with right-sided sciatica 05/30/2018 08/08/2018--patient seen in follow-up and reports her symptoms are tolerable although she still has right-sided intermittent lumbar radicular symptoms.  Her back in itself is not particularly  bothersome.  It is not particularly interfering with her daily activities.  We discussed options including referral back to neurosurgery versus consideration of physical therapy and perhaps some oral predniso   • Degeneration of intervertebral disc of mid-cervical region 12/23/2015 12/23/2015--MRI cervical spine reveals at C4-C5 there is mild spinal stenosis. There is moderate to severe bilateral foraminal narrowing. At C5-C6 there is mild left but no right foraminal narrowing. At C6-C7 there are surgical changes with hardware and this results in artifact. Evaluation for solid bony fusion cannot be done because of this. There is only minimal if any canal narrowing at C6-C7. There is mild right and mild to moderate left bony foraminal narrowing. C7-T1 reveals minimal right foraminal narrowing.    • Disc degeneration, lumbar 05/31/2018 06/07/2018--patient seen in follow-up and reports her hip pain is much better after starting prednisone.  She feels like symptoms are good enough that we can not proceed with any further evaluation or treatment at this time.  05/31/2018--x-ray of the right hip revealed no acute or chronic osseous or soft tissue abnormality.  X-ray of the lumbar spine reveals degenerative disc changes at L3-L4, L4-L5, and L5-S1.  Minor endplate osteophytic changes are noted at multiple levels.  No acute abnormality seen.  Diffuse facet arthritic changes at L4-L5 and L5-S1.  Recommend MRI lumbar spine if clinically indicated.  Patient notified and started on prednisone 50 mg by mouth daily ×5 days, taper and discontinue.  05/30/2018--patient presents with approximately two-week history of right hip pain which seems to be located laterally and also radiates in towards the inguinal ligament region.  No known trauma.  The pain is worse with increasing weightbearing and also with sleeping on her right side.  No problems with si   • Diverticulosis of colon 06/21/2016 11/22/2016--colonoscopy revealed a  polyp at the ileocecal valve and sigmoid colon.  There were 4 polyps at the rectosigmoid colon.  The single diverticulum in the sigmoid colon was not commented upon.  Pathology returned a sessile serrated adenoma at the ileocecal valve and the sigmoid colon.  The rectosigmoid polyp was hyperplastic.  06/21/2016--colonoscopy revealed a greater than 50 mm polyp at the ileocecal valve.  Pathology returned hyperplastic polyp.  A multilobulated 5 mm polyp was found in the transverse colon.  Pathology returned tubular adenoma with low grade dysplasia.  3 multilobulated polyps were found in the sigmoid colon.  Pathology revealed fragments of hyperplastic polyps.  2 multilobulated polyps were found in the rectosigmoid colon and pathology returned tubular adenoma with low-grade dysplasia.  A single small mouth diverticula and was found in the sigmoid.  Nonbleeding internal hemorrhoids.  General surgery recommended repeat colonoscopy in 3 months.   • Hallux valgus of left foot 04/04/2016 03/13/2015--patient was evaluated by the podiatrist and assessment was ingrown great toenail and paronychia. Partial nail avulsion completed.   03/13/2015--patient presents with a greater than one-year history of intermittent pain, swelling, redness, tenderness involving her left great toe. Examination reveals early hallux valgus deformity as well as a possible ingrown nail. Patient referred to podiatry. Uric acid and CRP obtained to rule out possibility of gout and both were normal.   • Herniated lumbar intervertebral disc 05/31/2018 08/08/2018--patient seen in follow-up and reports her symptoms are tolerable although she still has right-sided intermittent lumbar radicular symptoms.  Her back in itself is not particularly bothersome.  It is not particularly interfering with her daily activities.  We discussed options including referral back to neurosurgery versus consideration of physical therapy and perhaps some oral predniso   •  History of colon polyps, 11/15/2018--multiple hyperplastic.  11/22/2016-- adenoma ×2. 06/21/2016--tubular adenoma ×2.  Multiple hyperplastic.   06/21/2016    11/15/2018--colonoscopy revealed several small polyps at the ileocecal valve that were removed piecemeal.  #10, 1-2 mm polyps in the sigmoid colon and proximal descending colon.  Removed.  #1, 3 mm polyp in the distal descending colon.  Removed.  Nonbleeding internal hemorrhoids.  Pathology returned multiple hyperplastic polyps.  06/01/2017--colonoscopy revealed hyperplastic ×2.  Perianal fistulot   • History of Perirectal abscess 09/01/2016 09/12/2016--patient seen in follow-up for dressing change.  Patient is concerned about not being on an antibiotic and I explained to her that the definitive treatment is incision and drainage.  On exam the wound looks very good with no sign of purulent discharge.  She has a follow-up with general surgeon tomorrow.  09/02/2016--incision and drainage of left perirectal abscess by general surgery.  Cultures returned moderate growth of Klebsiella pneumoniae and heavy growth of Streptococcus agalactiae.   09/01/2016--patient presents with approximately 4 month history of a painful and swollen subcutaneous mass left buttock that is draining foul-smelling purulent material.  Examination confirms a large subcutaneous mass/abscess that I suspect is an infected sebaceous cyst.  General surgery referral given.   • Hyperlipidemia 04/04/2016 03/18/2015--NMR reveals total cholesterol 216, triglycerides mildly elevated at 171. LDL particle number elevated at 1654. Small LDL particle number is normal at 446. HDL particle number normal at 33.4. Recommend diet and lifestyle changes.   • Multiple environmental allergies 11/02/2016    Patient has never had allergy testing.   • Non morbid obesity 04/04/2016 05/01/2017--patient seen in follow-up and reports she stopped the phentermine and topiramate due to side effects,  specifically constipation.  Current weight is 232 pounds which is the same as her last visit.  She discontinued these medications and her symptoms resolved.  She restarted the medications and the symptoms returned.  03/14/2017--patient seen in follow-up and has lost 5 pounds.  She seems to be tolerating the medication well but does have a dry mouth.  I see no reason why we cannot continue the current regimen.  I will have her follow-up in about 5-6 weeks to reassess. Current weight is 232 pounds.  02/16/2017--patient presents with a several year history of weight gain despite multiple attempts at various diet and exercise regimens.  She has failed low carbohydrate diet.  He has not been to Weight Watchers which in my opinion is a farce.  She is asking about Saxenda which I think could be very helpful but I explained to her that it's extremely expensive and her insurance is likely not pay for i   • Smoker    • Vitamin D deficiency 04/04/2016       Past Surgical History:   Procedure Laterality Date   • CERVICAL FUSION  07/23/2012 07/23/2012--anterior cervical spinal fusion, C6--C7 with instrumentation.   • COLONOSCOPY N/A 6/21/2016 06/21/2016--colonoscopy revealed a greater than 50 mm polyp at the ileocecal valve.  Pathology returned hyperplastic polyp.  A multilobulated 5 mm polyp was found in the transverse colon.  Pathology returned tubular adenoma with low grade dysplasia.  3 multilobulated polyps were found in the sigmoid colon.  Pathology revealed fragments of hyperplastic polyps.  2 multilobulated polyps were found in   • COLONOSCOPY N/A 11/22/2016 11/22/2016--colonoscopy revealed a polyp at the ileocecal valve and sigmoid colon.  There were 4 polyps at the rectosigmoid colon.  The single diverticulum in the sigmoid colon was not commented upon.  Pathology returned a sessile serrated adenoma at the ileocecal valve and the sigmoid colon.  The rectosigmoid polyp was hyperplastic.   • COLONOSCOPY N/A  11/15/2018    11/15/2018--colonoscopy revealed several small polyps at the ileocecal valve that were removed piecemeal.  #10, 1-2 mm polyps in the sigmoid colon and proximal descending colon.  Removed.  #1, 3 mm polyp in the distal descending colon.  Removed.  Nonbleeding internal hemorrhoids.  Pathology returned multiple hyperplastic polyps.   • COLONOSCOPY W/ POLYPECTOMY  06/01/2017 06/01/2017--colonoscopy revealed hyperplastic ×2.  Perianal fistulotomy.  External hemorrhoidectomy of one column.   • ENDOMETRIAL ABLATION  2006 2006--endometrial ablation ×2 for menorrhagia/menorrhea.   • INCISION AND DRAINAGE PERIRECTAL ABSCESS N/A 9/2/2016 09/02/2016--incision and drainage of perirectal abscess.   • RECTAL EXAMINATION UNDER ANESTHESIA N/A 6/1/2017 06/01/2017--colonoscopy revealed hyperplastic ×2.  Perianal fistulotomy.  External hemorrhoidectomy of one column.   • RECTAL FISTULOTOMY N/A 9/29/2017 09/29/2017--rectal fistulotomy.  Rectal exam under anesthesia.   • SUBTOTAL HYSTERECTOMY  10/2008    October 2008--partial hysterectomy for menorrhagia/menorrhea. Previously had endometrial ablation procedures 2. Pt states uterus was removed.   • WRIST ARTHROPLASTY Right 07/15/2008    07/15/2008--right radial ulnar distal joint arthroplasty performed for non-traumatic arthritis/impingement syndrome.         Current Facility-Administered Medications:   •  lactated ringers infusion, 30 mL/hr, Intravenous, Continuous PRN, Ej Sanford MD, Last Rate: 30 mL/hr at 04/19/22 0836, Currently Infusing at 04/19/22 0836    Facility-Administered Medications Ordered in Other Encounters:   •  lidocaine (XYLOCAINE) 2% injection, , Intravenous, PRN, Anderson Calvin MD, 60 mg at 04/19/22 0839  •  Propofol (DIPRIVAN) injection, , Intravenous, Continuous PRN, Anderson Calvin MD, Last Rate: 108.36 mL/hr at 04/19/22 0838, 200 mcg/kg/min at 04/19/22 0838    No Known Allergies    Family History   Problem Relation Age  "of Onset   • Diabetes Mother         Type 2   • Diabetes Maternal Grandmother         Type 2   • Malig Hyperthermia Neg Hx    • Breast cancer Neg Hx    • Ovarian cancer Neg Hx    • Colon cancer Neg Hx    • Deep vein thrombosis Neg Hx    • Pulmonary embolism Neg Hx        Social History     Socioeconomic History   • Marital status:    • Number of children: 2   • Years of education: 12   Tobacco Use   • Smoking status: Current Every Day Smoker     Packs/day: 0.50     Years: 20.00     Pack years: 10.00     Types: Cigarettes   • Smokeless tobacco: Never Used   Vaping Use   • Vaping Use: Never used   Substance and Sexual Activity   • Alcohol use: Yes     Alcohol/week: 1.0 standard drink     Types: 1 Glasses of wine per week     Comment: Occasional   • Drug use: No   • Sexual activity: Yes     Partners: Male     Birth control/protection: Surgical     Comment: RIVERA thornton [place] age 2007        Review Of Systems:  A comprehensive review of systems was negative.    Objective:   Physical exam:  /74 (BP Location: Left arm, Patient Position: Lying)   Pulse 61   Resp 16   Ht 177.8 cm (70\")   Wt 90.3 kg (199 lb)   SpO2 94%   BMI 28.55 kg/m²     General Appearance:  awake, alert, oriented, in no acute distress and well developed, well nourished  Lungs:  Normal expansion.  Clear to auscultation.  No rales, rhonchi, or wheezing.  Heart:  Heart sounds are normal.  Regular rate and rhythm without murmur, gallop or rub.  Abdomen:  Soft, non-tender, normal bowel sounds; no bruits, organomegaly or masses.    Assessment:    Rosemarie Gandhi is a 56 y.o. female who presents for evaluation for colon cancer surveillance.    Patient has increased risk factors or warning signs or symptoms.  I reviewed current ACG guidelines for colon cancer screening:    A)  Flex sig q 5yrs with yearly fecal occult blood testing  B)  Virtual colonoscopy  C)  Colonoscopy with possible biopsy/polypectomy with IV sedation at appropriate       screening " intervals    The patient has no family history of colon cancer.  She  has a personal history of colon polyp who presents for colon cancer surveillance evaluation.   I would advise a colonscopy.     The rationale for each option as well as all risks and benefits of each alternative were discussed with the patient in detail.  The patient understands and does wish to proceed with Colonoscopy, Diagnostic        The rationale for colonoscopy with possible biopsy, possible polypectomy, with IV sedation as well as all risks, benefits, and alternatives were discussed with the patient in detail. Risks including but not limited to perforation, bleeding,need for blood transfusion or emergent surgery ,and missed neoplasm were reviewed in detail with the patient The patient demonstrates full understanding and wishes to proceed with the colonoscopy.

## 2022-04-19 NOTE — ANESTHESIA POSTPROCEDURE EVALUATION
"Patient: Rosemarie Gandhi    Procedure Summary     Date: 04/19/22 Room / Location:  [facility]    Anesthesia Start: 0836 Anesthesia Stop: 0916    Procedure: COLONOSCOPY TO CECUM WITH HOT SNARE AND BIOPSY POLYPECTOMIES (N/A ) Diagnosis:       History of adenomatous polyp of colon      (History of adenomatous polyp of colon [Z86.010])    Surgeons: Ej Sanford MD Provider: Anderson Calvin MD    Anesthesia Type: MAC ASA Status: 3          Anesthesia Type: MAC    Vitals  Vitals Value Taken Time   /70 04/19/22 0940   Temp     Pulse 88 04/19/22 0940   Resp 18 04/19/22 0940   SpO2 98 % 04/19/22 0940           Post Anesthesia Care and Evaluation    Patient location during evaluation: PACU  Patient participation: complete - patient participated  Level of consciousness: awake  Pain score: 0  Pain management: adequate  Airway patency: patent  Anesthetic complications: No anesthetic complications  PONV Status: none  Cardiovascular status: acceptable  Respiratory status: acceptable  Hydration status: acceptable    Comments: /70 (BP Location: Left arm, Patient Position: Sitting)   Pulse 88   Resp 18   Ht 177.8 cm (70\")   Wt 90.3 kg (199 lb)   SpO2 98%   BMI 28.55 kg/m²       "

## 2022-04-19 NOTE — ANESTHESIA PREPROCEDURE EVALUATION
Anesthesia Evaluation     Patient summary reviewed and Nursing notes reviewed                Airway   Mallampati: I  TM distance: >3 FB  Neck ROM: full  No difficulty expected  Dental - normal exam     Pulmonary - normal exam   (+) a smoker Current,   Cardiovascular - normal exam    (+) hyperlipidemia,       Neuro/Psych  (+) numbness,    GI/Hepatic/Renal/Endo    (+) obesity,     (-) morbid obesity    Musculoskeletal     Abdominal  - normal exam    Bowel sounds: normal.   Substance History - negative use     OB/GYN negative ob/gyn ROS         Other   arthritis,                    Anesthesia Plan    ASA 3     MAC       Anesthetic plan, all risks, benefits, and alternatives have been provided, discussed and informed consent has been obtained with: patient.        CODE STATUS:

## 2022-04-20 LAB
LAB AP CASE REPORT: NORMAL
PATH REPORT.FINAL DX SPEC: NORMAL
PATH REPORT.GROSS SPEC: NORMAL

## 2022-04-25 ENCOUNTER — TELEPHONE (OUTPATIENT)
Dept: SURGERY | Facility: CLINIC | Age: 57
End: 2022-04-25

## 2022-04-25 NOTE — TELEPHONE ENCOUNTER
----- Message from Ej Sanford MD sent at 4/22/2022  2:51 PM EDT -----  Please call the patient with c-scope results. PIC c-scope in 5 years.  Please let her know if she has any issues with her perianal area then she can follow-up in my office anytime, thank you

## 2022-04-25 NOTE — TELEPHONE ENCOUNTER
"Patient called the office this morning requesting her 4/19/22 c-scope path results. Informed her that her polyps were benign. Read the below message from Dr. Sanford. Although her original recommendation to repeat her scope was 3 years, Dr. Sanford now recommends 5 years due to her benign path. Recall placed. HM tab updated.     Message from Dr. Buchanan 4/22/22 attached to patient's path results:  \"Please call the patient with c-scope results. PIC c-scope in 5 years. Please let her know if she has any issues with her perianal area then she can follow-up in my office anytime, thank you\"  "

## 2022-06-22 ENCOUNTER — LAB (OUTPATIENT)
Dept: LAB | Facility: HOSPITAL | Age: 57
End: 2022-06-22

## 2022-06-22 DIAGNOSIS — Z00.00 ROUTINE PHYSICAL EXAMINATION: ICD-10-CM

## 2022-06-22 DIAGNOSIS — E78.2 MIXED HYPERLIPIDEMIA: Chronic | ICD-10-CM

## 2022-06-22 DIAGNOSIS — Z51.81 THERAPEUTIC DRUG MONITORING: ICD-10-CM

## 2022-06-22 DIAGNOSIS — E55.9 VITAMIN D DEFICIENCY: Chronic | ICD-10-CM

## 2022-06-22 LAB
25(OH)D3 SERPL-MCNC: 52.8 NG/ML (ref 30–100)
ALBUMIN SERPL-MCNC: 4.5 G/DL (ref 3.5–5.2)
ALBUMIN/GLOB SERPL: 2.1 G/DL
ALP SERPL-CCNC: 61 U/L (ref 39–117)
ALT SERPL W P-5'-P-CCNC: 11 U/L (ref 1–33)
AMORPH URATE CRY URNS QL MICRO: ABNORMAL /HPF
ANION GAP SERPL CALCULATED.3IONS-SCNC: 9 MMOL/L (ref 5–15)
AST SERPL-CCNC: 14 U/L (ref 1–32)
BACTERIA UR QL AUTO: ABNORMAL /HPF
BILIRUB SERPL-MCNC: 0.3 MG/DL (ref 0–1.2)
BILIRUB UR QL STRIP: NEGATIVE
BUN SERPL-MCNC: 8 MG/DL (ref 6–20)
BUN/CREAT SERPL: 9.9 (ref 7–25)
CALCIUM SPEC-SCNC: 9.4 MG/DL (ref 8.6–10.5)
CHLORIDE SERPL-SCNC: 104 MMOL/L (ref 98–107)
CK SERPL-CCNC: 86 U/L (ref 20–180)
CLARITY UR: ABNORMAL
CO2 SERPL-SCNC: 27 MMOL/L (ref 22–29)
COLOR UR: YELLOW
CREAT SERPL-MCNC: 0.81 MG/DL (ref 0.57–1)
DEPRECATED RDW RBC AUTO: 41.5 FL (ref 37–54)
EGFRCR SERPLBLD CKD-EPI 2021: 85.3 ML/MIN/1.73
ERYTHROCYTE [DISTWIDTH] IN BLOOD BY AUTOMATED COUNT: 12.7 % (ref 12.3–15.4)
GLOBULIN UR ELPH-MCNC: 2.1 GM/DL
GLUCOSE SERPL-MCNC: 89 MG/DL (ref 65–99)
GLUCOSE UR STRIP-MCNC: NEGATIVE MG/DL
HCT VFR BLD AUTO: 42.3 % (ref 34–46.6)
HGB BLD-MCNC: 14.1 G/DL (ref 12–15.9)
HGB UR QL STRIP.AUTO: NEGATIVE
HYALINE CASTS UR QL AUTO: ABNORMAL /LPF
KETONES UR QL STRIP: NEGATIVE
LEUKOCYTE ESTERASE UR QL STRIP.AUTO: ABNORMAL
MCH RBC QN AUTO: 30 PG (ref 26.6–33)
MCHC RBC AUTO-ENTMCNC: 33.3 G/DL (ref 31.5–35.7)
MCV RBC AUTO: 90 FL (ref 79–97)
NITRITE UR QL STRIP: NEGATIVE
PH UR STRIP.AUTO: 8 [PH] (ref 5–8)
PLATELET # BLD AUTO: 202 10*3/MM3 (ref 140–450)
PMV BLD AUTO: 10.3 FL (ref 6–12)
POTASSIUM SERPL-SCNC: 4.5 MMOL/L (ref 3.5–5.2)
PROT SERPL-MCNC: 6.6 G/DL (ref 6–8.5)
PROT UR QL STRIP: NEGATIVE
RBC # BLD AUTO: 4.7 10*6/MM3 (ref 3.77–5.28)
RBC # UR STRIP: ABNORMAL /HPF
REF LAB TEST METHOD: ABNORMAL
SODIUM SERPL-SCNC: 140 MMOL/L (ref 136–145)
SP GR UR STRIP: 1.02 (ref 1–1.03)
SQUAMOUS #/AREA URNS HPF: ABNORMAL /HPF
T3FREE SERPL-MCNC: 2.65 PG/ML (ref 2–4.4)
T4 FREE SERPL-MCNC: 1.17 NG/DL (ref 0.93–1.7)
TSH SERPL DL<=0.05 MIU/L-ACNC: 2.7 UIU/ML (ref 0.27–4.2)
UROBILINOGEN UR QL STRIP: ABNORMAL
WBC # UR STRIP: ABNORMAL /HPF
WBC NRBC COR # BLD: 7.03 10*3/MM3 (ref 3.4–10.8)

## 2022-06-22 PROCEDURE — 82306 VITAMIN D 25 HYDROXY: CPT

## 2022-06-22 PROCEDURE — 36415 COLL VENOUS BLD VENIPUNCTURE: CPT

## 2022-06-22 PROCEDURE — 84439 ASSAY OF FREE THYROXINE: CPT

## 2022-06-22 PROCEDURE — 84481 FREE ASSAY (FT-3): CPT

## 2022-06-22 PROCEDURE — 81001 URINALYSIS AUTO W/SCOPE: CPT

## 2022-06-22 PROCEDURE — 85027 COMPLETE CBC AUTOMATED: CPT

## 2022-06-22 PROCEDURE — 80053 COMPREHEN METABOLIC PANEL: CPT

## 2022-06-22 PROCEDURE — 84443 ASSAY THYROID STIM HORMONE: CPT

## 2022-06-22 PROCEDURE — 82550 ASSAY OF CK (CPK): CPT

## 2022-06-22 PROCEDURE — 83704 LIPOPROTEIN BLD QUAN PART: CPT

## 2022-06-22 PROCEDURE — 80061 LIPID PANEL: CPT

## 2022-06-23 LAB
CHOLEST SERPL-MCNC: 168 MG/DL (ref 100–199)
HDL SERPL-SCNC: 40 UMOL/L
HDLC SERPL-MCNC: 61 MG/DL
LDL SERPL QN: 20.7 NM
LDL SERPL-SCNC: 1223 NMOL/L
LDL SMALL SERPL-SCNC: 568 NMOL/L
LDLC SERPL CALC-MCNC: 91 MG/DL (ref 0–99)
TRIGL SERPL-MCNC: 84 MG/DL (ref 0–149)

## 2022-06-29 ENCOUNTER — OFFICE VISIT (OUTPATIENT)
Dept: INTERNAL MEDICINE | Facility: CLINIC | Age: 57
End: 2022-06-29

## 2022-06-29 VITALS
DIASTOLIC BLOOD PRESSURE: 62 MMHG | OXYGEN SATURATION: 98 % | BODY MASS INDEX: 27.92 KG/M2 | HEART RATE: 81 BPM | RESPIRATION RATE: 18 BRPM | HEIGHT: 70 IN | SYSTOLIC BLOOD PRESSURE: 116 MMHG | WEIGHT: 195 LBS

## 2022-06-29 DIAGNOSIS — Z78.0 POSTMENOPAUSAL STATE: ICD-10-CM

## 2022-06-29 DIAGNOSIS — M54.41 CHRONIC BILATERAL LOW BACK PAIN WITH RIGHT-SIDED SCIATICA: Chronic | ICD-10-CM

## 2022-06-29 DIAGNOSIS — E55.9 VITAMIN D DEFICIENCY: Chronic | ICD-10-CM

## 2022-06-29 DIAGNOSIS — F17.210 CIGARETTE SMOKER: Chronic | ICD-10-CM

## 2022-06-29 DIAGNOSIS — Z23 NEED FOR PNEUMOCOCCAL VACCINATION: ICD-10-CM

## 2022-06-29 DIAGNOSIS — Z00.00 ROUTINE PHYSICAL EXAMINATION: Primary | ICD-10-CM

## 2022-06-29 DIAGNOSIS — E66.3 OVERWEIGHT (BMI 25.0-29.9): Chronic | ICD-10-CM

## 2022-06-29 DIAGNOSIS — J30.1 CHRONIC SEASONAL ALLERGIC RHINITIS DUE TO POLLEN: Chronic | ICD-10-CM

## 2022-06-29 DIAGNOSIS — G89.29 CHRONIC BILATERAL LOW BACK PAIN WITH RIGHT-SIDED SCIATICA: Chronic | ICD-10-CM

## 2022-06-29 DIAGNOSIS — K57.30 DIVERTICULOSIS OF LARGE INTESTINE WITHOUT HEMORRHAGE: Chronic | ICD-10-CM

## 2022-06-29 DIAGNOSIS — E78.2 MIXED HYPERLIPIDEMIA: Chronic | ICD-10-CM

## 2022-06-29 DIAGNOSIS — Z86.010 HISTORY OF COLON POLYPS: Chronic | ICD-10-CM

## 2022-06-29 DIAGNOSIS — Z51.81 THERAPEUTIC DRUG MONITORING: ICD-10-CM

## 2022-06-29 DIAGNOSIS — Z91.09 MULTIPLE ENVIRONMENTAL ALLERGIES: Chronic | ICD-10-CM

## 2022-06-29 PROBLEM — K64.8 INTERNAL HEMORRHOIDS: Status: RESOLVED | Noted: 2022-04-19 | Resolved: 2022-06-29

## 2022-06-29 PROCEDURE — 99396 PREV VISIT EST AGE 40-64: CPT | Performed by: INTERNAL MEDICINE

## 2022-06-29 PROCEDURE — 90471 IMMUNIZATION ADMIN: CPT | Performed by: INTERNAL MEDICINE

## 2022-06-29 PROCEDURE — 90677 PCV20 VACCINE IM: CPT | Performed by: INTERNAL MEDICINE

## 2022-06-29 NOTE — PROGRESS NOTES
06/29/2022    Patient Information  Rosemarie CHINCHILLA [address] [address]      1965  [unfilled]  There is no work phone number on file.    Chief Complaint:     Routine annual physical examination and follow-up blood work.  No new acute complaints.    History of Present Illness:    Patient with hyperlipidemia, vitamin D deficiency, history of colon polyps, diverticulosis of colon, recent colonoscopy, environmental allergies/allergic rhinitis, chronic lower back pain, cigarette smoker, overweight.  She presents today for routine annual physical exam and follow-up blood work.  Patient also had a colonoscopy we will review.  Past medical history reviewed and updated were necessary including health maintenance parameters.  This reveals she needs Prevnar 20.  COVID status also discussed.  See below.    Review of Systems   Constitutional: Negative.   HENT: Negative.    Eyes: Negative.    Cardiovascular: Negative.    Respiratory: Negative.    Endocrine: Negative.    Hematologic/Lymphatic: Negative.    Skin: Negative.    Musculoskeletal: Positive for back pain.   Gastrointestinal: Negative.    Genitourinary: Negative.    Neurological: Negative.    Psychiatric/Behavioral: Negative.    Allergic/Immunologic: Negative.        Active Problems:    Patient Active Problem List   Diagnosis   • Degeneration of intervertebral disc of mid-cervical region   • Hallux valgus of left foot   • Hyperlipidemia   • Overweight (BMI 25.0-29.9)   • Vitamin D deficiency   • Therapeutic drug monitoring   • Routine physical examination   • History of colon polyps, 4/19/2022--tubular adenoma x1.  Hyperplastic x6.11/15/2018--multiple hyperplastic.  11/22/2016-- adenoma ×2. 06/21/2016--tubular adenoma ×2.  Multiple hyperplastic.     • Diverticulosis of colon   • Multiple environmental allergies   • Allergic rhinitis   •  Chronic right hip pain   • Chronic bilateral low back pain with right-sided sciatica   • Cigarette smoker   • Postmenopausal state         Past Medical History:   Diagnosis Date   • Allergic rhinitis 11/02/2016    Patient has never had allergy testing.   • Chronic bilateral low back pain with right-sided sciatica 05/30/2018 08/08/2018--patient seen in follow-up and reports her symptoms are tolerable although she still has right-sided intermittent lumbar radicular symptoms.  Her back in itself is not particularly bothersome.  It is not particularly interfering with her daily activities.  We discussed options including referral back to neurosurgery versus consideration of physical therapy and perhaps some oral predniso   • Cigarette smoker 06/29/2022   • Degeneration of intervertebral disc of mid-cervical region 12/23/2015 12/23/2015--MRI cervical spine reveals at C4-C5 there is mild spinal stenosis. There is moderate to severe bilateral foraminal narrowing. At C5-C6 there is mild left but no right foraminal narrowing. At C6-C7 there are surgical changes with hardware and this results in artifact. Evaluation for solid bony fusion cannot be done because of this. There is only minimal if any canal narrowing at C6-C7. There is mild right and mild to moderate left bony foraminal narrowing. C7-T1 reveals minimal right foraminal narrowing.    • Disc degeneration, lumbar 05/31/2018 06/07/2018--patient seen in follow-up and reports her hip pain is much better after starting prednisone.  She feels like symptoms are good enough that we can not proceed with any further evaluation or treatment at this time.  05/31/2018--x-ray of the right hip revealed no acute or chronic osseous or soft tissue abnormality.  X-ray of the lumbar spine reveals degenerative disc changes at L3-L4, L4-L5, and L5-S1.  Minor endplate osteophytic changes are noted at multiple levels.  No acute abnormality seen.  Diffuse facet arthritic changes at  L4-L5 and L5-S1.  Recommend MRI lumbar spine if clinically indicated.  Patient notified and started on prednisone 50 mg by mouth daily ×5 days, taper and discontinue.  05/30/2018--patient presents with approximately two-week history of right hip pain which seems to be located laterally and also radiates in towards the inguinal ligament region.  No known trauma.  The pain is worse with increasing weightbearing and also with sleeping on her right side.  No problems with si   • Diverticulosis of colon 06/21/2016 11/22/2016--colonoscopy revealed a polyp at the ileocecal valve and sigmoid colon.  There were 4 polyps at the rectosigmoid colon.  The single diverticulum in the sigmoid colon was not commented upon.  Pathology returned a sessile serrated adenoma at the ileocecal valve and the sigmoid colon.  The rectosigmoid polyp was hyperplastic.  06/21/2016--colonoscopy revealed a greater than 50 mm polyp at the ileocecal valve.  Pathology returned hyperplastic polyp.  A multilobulated 5 mm polyp was found in the transverse colon.  Pathology returned tubular adenoma with low grade dysplasia.  3 multilobulated polyps were found in the sigmoid colon.  Pathology revealed fragments of hyperplastic polyps.  2 multilobulated polyps were found in the rectosigmoid colon and pathology returned tubular adenoma with low-grade dysplasia.  A single small mouth diverticula and was found in the sigmoid.  Nonbleeding internal hemorrhoids.  General surgery recommended repeat colonoscopy in 3 months.   • Hallux valgus of left foot 04/04/2016 03/13/2015--patient was evaluated by the podiatrist and assessment was ingrown great toenail and paronychia. Partial nail avulsion completed.   03/13/2015--patient presents with a greater than one-year history of intermittent pain, swelling, redness, tenderness involving her left great toe. Examination reveals early hallux valgus deformity as well as a possible ingrown nail. Patient referred to  podiatry. Uric acid and CRP obtained to rule out possibility of gout and both were normal.   • Herniated lumbar intervertebral disc 05/31/2018 08/08/2018--patient seen in follow-up and reports her symptoms are tolerable although she still has right-sided intermittent lumbar radicular symptoms.  Her back in itself is not particularly bothersome.  It is not particularly interfering with her daily activities.  We discussed options including referral back to neurosurgery versus consideration of physical therapy and perhaps some oral predniso   • History of colon polyps, 4/19/2022--tubular adenoma x1.  Hyperplastic x6.11/15/2018--multiple hyperplastic.  11/22/2016-- adenoma ×2. 06/21/2016--tubular adenoma ×2.  Multiple hyperplastic.   06/21/2016 April 19, 2022--colonoscopy revealed #1, 4 mm polyp in the proximal transverse colon.  #2, 1 to 2 mm polyps in the sigmoid colon.  #4, 1 to 2 mm polyps in the proximal rectum.  Moderate diverticulosis in the sigmoid colon.  Nonbleeding internal hemorrhoids.  Pathology returned tubular adenoma x1, hyperplastic x6.  11/15/2018--colonoscopy revealed several small polyps at the ileocecal valve that we   • History of Perirectal abscess 09/01/2016 09/12/2016--patient seen in follow-up for dressing change.  Patient is concerned about not being on an antibiotic and I explained to her that the definitive treatment is incision and drainage.  On exam the wound looks very good with no sign of purulent discharge.  She has a follow-up with general surgeon tomorrow.  09/02/2016--incision and drainage of left perirectal abscess by general surgery.  Cultures returned moderate growth of Klebsiella pneumoniae and heavy growth of Streptococcus agalactiae.   09/01/2016--patient presents with approximately 4 month history of a painful and swollen subcutaneous mass left buttock that is draining foul-smelling purulent material.  Examination confirms a large subcutaneous mass/abscess  that I suspect is an infected sebaceous cyst.  General surgery referral given.   • Hyperlipidemia 04/04/2016 03/18/2015--NMR reveals total cholesterol 216, triglycerides mildly elevated at 171. LDL particle number elevated at 1654. Small LDL particle number is normal at 446. HDL particle number normal at 33.4. Recommend diet and lifestyle changes.   • Internal hemorrhoids 04/19/2022   • Multiple environmental allergies 11/02/2016    Patient has never had allergy testing.   • Overweight (BMI 25.0-29.9) 04/04/2016 05/01/2017--patient seen in follow-up and reports she stopped the phentermine and topiramate due to side effects, specifically constipation.  Current weight is 232 pounds which is the same as her last visit.  She discontinued these medications and her symptoms resolved.  She restarted the medications and the symptoms returned.  03/14/2017--patient seen in follow-up and has lost 5 pounds.  She seem   • Vitamin D deficiency 04/04/2016         Past Surgical History:   Procedure Laterality Date   • CERVICAL FUSION  07/23/2012 07/23/2012--anterior cervical spinal fusion, C6--C7 with instrumentation.   • COLONOSCOPY N/A 6/21/2016 06/21/2016--colonoscopy revealed a greater than 50 mm polyp at the ileocecal valve.  Pathology returned hyperplastic polyp.  A multilobulated 5 mm polyp was found in the transverse colon.  Pathology returned tubular adenoma with low grade dysplasia.  3 multilobulated polyps were found in the sigmoid colon.  Pathology revealed fragments of hyperplastic polyps.  2 multilobulated polyps were found in   • COLONOSCOPY N/A 11/22/2016 11/22/2016--colonoscopy revealed a polyp at the ileocecal valve and sigmoid colon.  There were 4 polyps at the rectosigmoid colon.  The single diverticulum in the sigmoid colon was not commented upon.  Pathology returned a sessile serrated adenoma at the ileocecal valve and the sigmoid colon.  The rectosigmoid polyp was hyperplastic.   • COLONOSCOPY  N/A 11/15/2018    11/15/2018--colonoscopy revealed several small polyps at the ileocecal valve that were removed piecemeal.  #10, 1-2 mm polyps in the sigmoid colon and proximal descending colon.  Removed.  #1, 3 mm polyp in the distal descending colon.  Removed.  Nonbleeding internal hemorrhoids.  Pathology returned multiple hyperplastic polyps.   • COLONOSCOPY N/A 4/19/2022    Procedure: COLONOSCOPY TO CECUM WITH HOT SNARE AND BIOPSY POLYPECTOMIES;  Surgeon: Ej Sanford MD;  Location: [facility] ENDOSCOPY;  Service: General;  Laterality: N/A;  PRE: HISTORY OF COLON POLYPS  POST: POLYPS, DIVERTICULOSIS, HEMORRHOIDS   • COLONOSCOPY W/ POLYPECTOMY  06/01/2017 06/01/2017--colonoscopy revealed hyperplastic ×2.  Perianal fistulotomy.  External hemorrhoidectomy of one column.   • ENDOMETRIAL ABLATION  2006 2006--endometrial ablation ×2 for menorrhagia/menorrhea.   • INCISION AND DRAINAGE PERIRECTAL ABSCESS N/A 9/2/2016 09/02/2016--incision and drainage of perirectal abscess.   • RECTAL EXAMINATION UNDER ANESTHESIA N/A 6/1/2017 06/01/2017--colonoscopy revealed hyperplastic ×2.  Perianal fistulotomy.  External hemorrhoidectomy of one column.   • RECTAL FISTULOTOMY N/A 9/29/2017 09/29/2017--rectal fistulotomy.  Rectal exam under anesthesia.   • SUBTOTAL HYSTERECTOMY  10/2008    October 2008--partial hysterectomy for menorrhagia/menorrhea. Previously had endometrial ablation procedures 2. Pt states uterus was removed.   • WRIST ARTHROPLASTY Right 07/15/2008    07/15/2008--right radial ulnar distal joint arthroplasty performed for non-traumatic arthritis/impingement syndrome.         No Known Allergies        Current Outpatient Medications:   •  ascorbic acid (VITAMIN C) 500 MG tablet, Take 500 mg by mouth Daily., Disp: , Rfl:   •  B Complex Vitamins (VITAMIN B COMPLEX PO), Take 1 capsule by mouth Daily., Disp: , Rfl:   •  Cholecalciferol (VITAMIN D3) 5000 UNITS tablet, Take 1 tablet by mouth Daily.,  "Disp: , Rfl:   •  cyclobenzaprine (FLEXERIL) 10 MG tablet, Take 1 p.o. 3 times daily as needed for muscle relaxer, Disp: 60 tablet, Rfl: 3  •  multivitamin with minerals tablet tablet, Take 1 tablet by mouth Daily., Disp: , Rfl:   •  pravastatin (PRAVACHOL) 40 MG tablet, Take 1 p.o. daily for high cholesterol, Disp: 90 tablet, Rfl: 3      Family History   Problem Relation Age of Onset   • Diabetes Mother         Type 2   • Diabetes Maternal Grandmother         Type 2   • Malig Hyperthermia Neg Hx    • Breast cancer Neg Hx    • Ovarian cancer Neg Hx    • Colon cancer Neg Hx    • Deep vein thrombosis Neg Hx    • Pulmonary embolism Neg Hx          Social History     Socioeconomic History   • Marital status:    • Number of children: 2   • Years of education: 12   Tobacco Use   • Smoking status: Current Every Day Smoker     Packs/day: 0.50     Years: 20.00     Pack years: 10.00     Types: Cigarettes   • Smokeless tobacco: Never Used   Vaping Use   • Vaping Use: Never used   Substance and Sexual Activity   • Alcohol use: Yes     Alcohol/week: 1.0 standard drink     Types: 1 Glasses of wine per week     Comment: Occasional   • Drug use: No   • Sexual activity: Yes     Partners: Male     Birth control/protection: Surgical     Comment: RIVERA thorn[place] [employer] age 2007         Vitals:    06/29/22 0733   BP: 116/62   Pulse: 81   Resp: 18   SpO2: 98%   Weight: 88.5 kg (195 lb)   Height: 177.8 cm (70\")        Body mass index is 27.98 kg/m².      Physical Exam:    General: Alert and oriented x 3.  No acute distress.  Overweight.  Normal affect.  HEENT: Pupils equal, round, reactive to light; extraocular movements intact; sclerae nonicteric; pharynx, ear canals and TMs normal.  Neck: Without JVD, thyromegaly, bruit, or adenopathy.  Lungs: Clear to auscultation in all fields.  Heart: Regular rate and rhythm without murmur, rub, gallop, or click.  Abdomen: Soft, nontender, without hepatosplenomegaly or hernia.  Bowel sounds normal.  : " Deferred.  Rectal: Deferred.  Extremities: Without clubbing, cyanosis, edema, or pulse deficit.  Neurologic: Intact without focal deficit.  Normal station and gait observed during ingress and egress from the examination room.  Skin: Without significant lesion.  Musculoskeletal: Unremarkable.    Lab/other results:    CBC is normal.  CPK normal.  CMP normal.  Total cholesterol 168, triglycerides 84, LDL particle number mildly elevated 1223, small LDL particle number elevated at 568, HDL particle number normal at 40.0.  Vitamin D normal at 52.8.  Urinalysis is negative.  Thyroid function test normal.    April 19, 2022--colonoscopy revealed #1, 4 mm polyp in the proximal transverse colon.  #2, 1 to 2 mm polyps in the sigmoid colon.  #4, 1 to 2 mm polyps in the proximal rectum.  Moderate diverticulosis in the sigmoid colon.  Nonbleeding internal hemorrhoids.  Pathology returned tubular adenoma x1, hyperplastic x6.    Assessment/Plan:     Diagnosis Plan   1. Routine physical examination  CBC (No Diff)    CK    Comprehensive Metabolic Panel    NMR LipoProfile    TSH    T4, Free    T3, Free    Vitamin D 25 Hydroxy    Urinalysis With Microscopic If Indicated (No Culture) - Urine, Clean Catch   2. Hyperlipidemia  CK    Comprehensive Metabolic Panel    NMR LipoProfile    TSH    T4, Free    T3, Free   3. Vitamin D deficiency  Vitamin D 25 Hydroxy   4. History of colon polyps, 11/15/2018--multiple hyperplastic.  11/22/2016-- adenoma ×2. 06/21/2016--tubular adenoma ×2.  Multiple hyperplastic.       5. Diverticulosis of colon     6. Multiple environmental allergies     7. Allergic rhinitis     8. Chronic bilateral low back pain with right-sided sciatica     9. Cigarette smoker     10. Overweight (BMI 25.0-29.9)     11. Need for pneumococcal vaccination  Pneumococcal Conjugate Vaccine 20-Valent (PCV20)   12. Postmenopausal state     13. Therapeutic drug monitoring  CBC (No Diff)    Urinalysis With Microscopic If Indicated  (No Culture) - Urine, Clean Catch     Patient presents with essentially normal annual physical except for the following issues: She has hyperlipidemia which is controlled fairly reasonably with pravastatin which she is tolerating well.  Vitamin D is normal with supplementation which is important given her postmenopausal status.  She has a history of colon polyps had a recent colonoscopy which showed multiple hyperplastic polyps about 1 tubular adenoma.  Patient is aware of the need for repeat colonoscopy in 3 to 5 years.  Her environmental allergies/allergic rhinitis currently not an issue.  She has intermittent chronic lower back pain with occasional radicular symptoms and this is currently stable.  Patient is cigarette smoker but is not ready to commit to smoking cessation and therefore counseling not performed.  She is overweight now strongly recommend low carbohydrate diet, exercise, and weight loss.  Patient has been making progress.    Several preventative health issues discussed including review of vaccinations and recommendations, including dietary issues, exercise and weight loss.  Safe sex practices discussed.  Patient advised to wear seatbelt whenever driving and avoid texting and driving.  Also advised to look both ways before crossing the street.  Patient is up-to-date on colon cancer screening.  Advised to avoid tobacco products and minimize alcohol consumption.    Plan is as follows: Prevnar 20 given.  No change in current medical regimen.  Patient will follow-up in 1 year with lab prior for annual physical.  Otherwise she will follow-up as needed.  COVID-19 vaccine discussed.       Procedures

## 2022-07-11 DIAGNOSIS — E78.2 MIXED HYPERLIPIDEMIA: Chronic | ICD-10-CM

## 2022-07-11 RX ORDER — PRAVASTATIN SODIUM 40 MG
TABLET ORAL
Qty: 90 TABLET | Refills: 3 | Status: SHIPPED | OUTPATIENT
Start: 2022-07-11

## 2023-07-18 ENCOUNTER — TELEPHONE (OUTPATIENT)
Dept: INTERNAL MEDICINE | Facility: CLINIC | Age: 58
End: 2023-07-18
Payer: COMMERCIAL

## 2023-07-18 NOTE — TELEPHONE ENCOUNTER
I cannot give her a 90 day supply because she is overdue for an office visit.  She has not been seen in 1 yr.  She can use [email or web address] and get it at [facility] for $9.89.  Hub can relay.

## 2023-07-18 NOTE — TELEPHONE ENCOUNTER
Caller: Rosemarie Gandhi    Relationship: Self    Best call back number:[phone]     What medications are you currently taking: pravastatin (PRAVACHOL) 40 MG tablet     What are your concerns: PATIENT CALLING STATING THAT HER INSURANCE WILL NOT PAY FOR THE MEDICATION UNLESS IT IS FOR A 90 DAY SUPPLY

## 2023-07-25 NOTE — TELEPHONE ENCOUNTER
HUB RELAYED MESSAGE TO PATIENT SHE STATED THAT THIS ISN'T A NEW PRESCRIPTION SHE HAS TAKEN THIS FOR SEVERAL YEARS SHE IS SCHEDULED ON 08/25/23.     PLEASE CALL PATIENT TO ADVISE  [phone]

## 2023-07-26 DIAGNOSIS — E78.2 MIXED HYPERLIPIDEMIA: Chronic | ICD-10-CM

## 2023-07-26 RX ORDER — PRAVASTATIN SODIUM 40 MG
TABLET ORAL
Qty: 90 TABLET | Refills: 3 | Status: SHIPPED | OUTPATIENT
Start: 2023-07-26 | End: 2023-07-26 | Stop reason: SDUPTHER

## 2023-07-26 RX ORDER — PRAVASTATIN SODIUM 40 MG
TABLET ORAL
Qty: 90 TABLET | Refills: 3 | Status: SHIPPED | OUTPATIENT
Start: 2023-07-26

## 2023-08-17 DIAGNOSIS — E55.9 VITAMIN D DEFICIENCY: ICD-10-CM

## 2023-08-17 DIAGNOSIS — Z00.00 ROUTINE PHYSICAL EXAMINATION: ICD-10-CM

## 2023-08-17 DIAGNOSIS — E78.2 MIXED HYPERLIPIDEMIA: Primary | ICD-10-CM

## 2023-08-17 DIAGNOSIS — E78.2 MIXED HYPERLIPIDEMIA: ICD-10-CM

## 2023-08-18 LAB
25(OH)D3+25(OH)D2 SERPL-MCNC: 41.1 NG/ML (ref 30–100)
ALBUMIN SERPL-MCNC: 4.5 G/DL (ref 3.5–5.2)
ALBUMIN/GLOB SERPL: 2.4 G/DL
ALP SERPL-CCNC: 58 U/L (ref 39–117)
ALT SERPL-CCNC: 11 U/L (ref 1–33)
APPEARANCE UR: CLEAR
AST SERPL-CCNC: 18 U/L (ref 1–32)
BACTERIA #/AREA URNS HPF: NORMAL /HPF
BILIRUB SERPL-MCNC: 0.4 MG/DL (ref 0–1.2)
BILIRUB UR QL STRIP: NEGATIVE
BUN SERPL-MCNC: 9 MG/DL (ref 6–20)
BUN/CREAT SERPL: 11.8 (ref 7–25)
CALCIUM SERPL-MCNC: 9.9 MG/DL (ref 8.6–10.5)
CASTS URNS MICRO: NORMAL
CHLORIDE SERPL-SCNC: 105 MMOL/L (ref 98–107)
CHOLEST SERPL-MCNC: 180 MG/DL (ref 100–199)
CK SERPL-CCNC: 112 U/L (ref 20–180)
CO2 SERPL-SCNC: 26.4 MMOL/L (ref 22–29)
COLOR UR: YELLOW
CREAT SERPL-MCNC: 0.76 MG/DL (ref 0.57–1)
EGFRCR SERPLBLD CKD-EPI 2021: 91.5 ML/MIN/1.73
EPI CELLS #/AREA URNS HPF: NORMAL /HPF
ERYTHROCYTE [DISTWIDTH] IN BLOOD BY AUTOMATED COUNT: 13.1 % (ref 12.3–15.4)
GLOBULIN SER CALC-MCNC: 1.9 GM/DL
GLUCOSE SERPL-MCNC: 98 MG/DL (ref 65–99)
GLUCOSE UR QL STRIP: NEGATIVE
HCT VFR BLD AUTO: 42.2 % (ref 34–46.6)
HDL SERPL-SCNC: 43.8 UMOL/L
HDLC SERPL-MCNC: 67 MG/DL
HGB BLD-MCNC: 14.3 G/DL (ref 12–15.9)
HGB UR QL STRIP: NEGATIVE
KETONES UR QL STRIP: NEGATIVE
LDL SERPL QN: 20.6 NM
LDL SERPL-SCNC: 1366 NMOL/L
LDL SMALL SERPL-SCNC: 651 NMOL/L
LDLC SERPL CALC-MCNC: 98 MG/DL (ref 0–99)
LEUKOCYTE ESTERASE UR QL STRIP: NEGATIVE
MCH RBC QN AUTO: 31 PG (ref 26.6–33)
MCHC RBC AUTO-ENTMCNC: 33.9 G/DL (ref 31.5–35.7)
MCV RBC AUTO: 91.3 FL (ref 79–97)
NITRITE UR QL STRIP: NEGATIVE
PH UR STRIP: 7 [PH] (ref 5–8)
PLATELET # BLD AUTO: 252 10*3/MM3 (ref 140–450)
POTASSIUM SERPL-SCNC: 5.3 MMOL/L (ref 3.5–5.2)
PROT SERPL-MCNC: 6.4 G/DL (ref 6–8.5)
PROT UR QL STRIP: NEGATIVE
RBC # BLD AUTO: 4.62 10*6/MM3 (ref 3.77–5.28)
RBC #/AREA URNS HPF: NORMAL /HPF
SODIUM SERPL-SCNC: 142 MMOL/L (ref 136–145)
SP GR UR STRIP: 1.02 (ref 1–1.03)
T3FREE SERPL-MCNC: 3.4 PG/ML (ref 2–4.4)
T4 FREE SERPL-MCNC: 1.27 NG/DL (ref 0.93–1.7)
TRIGL SERPL-MCNC: 82 MG/DL (ref 0–149)
TSH SERPL DL<=0.005 MIU/L-ACNC: 3.72 UIU/ML (ref 0.27–4.2)
UROBILINOGEN UR STRIP-MCNC: NORMAL MG/DL
WBC # BLD AUTO: 8.98 10*3/MM3 (ref 3.4–10.8)
WBC #/AREA URNS HPF: NORMAL /HPF

## 2023-09-06 ENCOUNTER — OFFICE VISIT (OUTPATIENT)
Dept: INTERNAL MEDICINE | Facility: CLINIC | Age: 58
End: 2023-09-06
Payer: COMMERCIAL

## 2023-09-06 VITALS
OXYGEN SATURATION: 96 % | RESPIRATION RATE: 14 BRPM | HEART RATE: 86 BPM | WEIGHT: 200.4 LBS | BODY MASS INDEX: 28.69 KG/M2 | SYSTOLIC BLOOD PRESSURE: 106 MMHG | HEIGHT: 70 IN | DIASTOLIC BLOOD PRESSURE: 68 MMHG

## 2023-09-06 DIAGNOSIS — Z86.010 HISTORY OF COLON POLYPS: Chronic | ICD-10-CM

## 2023-09-06 DIAGNOSIS — Z78.0 POSTMENOPAUSAL STATE: Chronic | ICD-10-CM

## 2023-09-06 DIAGNOSIS — J30.1 CHRONIC SEASONAL ALLERGIC RHINITIS DUE TO POLLEN: Chronic | ICD-10-CM

## 2023-09-06 DIAGNOSIS — Z00.00 ROUTINE PHYSICAL EXAMINATION: Primary | ICD-10-CM

## 2023-09-06 DIAGNOSIS — Z91.09 MULTIPLE ENVIRONMENTAL ALLERGIES: Chronic | ICD-10-CM

## 2023-09-06 DIAGNOSIS — M50.320 DEGENERATION OF INTERVERTEBRAL DISC OF MID-CERVICAL REGION, UNSPECIFIED SPINAL LEVEL: Chronic | ICD-10-CM

## 2023-09-06 DIAGNOSIS — Z12.31 BREAST CANCER SCREENING BY MAMMOGRAM: ICD-10-CM

## 2023-09-06 DIAGNOSIS — E66.3 OVERWEIGHT (BMI 25.0-29.9): Chronic | ICD-10-CM

## 2023-09-06 DIAGNOSIS — E55.9 VITAMIN D DEFICIENCY: Chronic | ICD-10-CM

## 2023-09-06 DIAGNOSIS — F17.210 CIGARETTE SMOKER: Chronic | ICD-10-CM

## 2023-09-06 DIAGNOSIS — Z51.81 THERAPEUTIC DRUG MONITORING: ICD-10-CM

## 2023-09-06 DIAGNOSIS — L81.9 PIGMENTED SKIN LESION OF UNCERTAIN BEHAVIOR OF UPPER EXTREMITY: ICD-10-CM

## 2023-09-06 DIAGNOSIS — E78.2 MIXED HYPERLIPIDEMIA: Chronic | ICD-10-CM

## 2023-09-06 DIAGNOSIS — M54.41 CHRONIC BILATERAL LOW BACK PAIN WITH RIGHT-SIDED SCIATICA: Chronic | ICD-10-CM

## 2023-09-06 DIAGNOSIS — G89.29 CHRONIC BILATERAL LOW BACK PAIN WITH RIGHT-SIDED SCIATICA: Chronic | ICD-10-CM

## 2023-09-06 RX ORDER — PRAVASTATIN SODIUM 40 MG
TABLET ORAL
Qty: 90 TABLET | Refills: 3 | Status: SHIPPED | OUTPATIENT
Start: 2023-09-06 | End: 2023-09-06 | Stop reason: SDUPTHER

## 2023-09-06 RX ORDER — PRAVASTATIN SODIUM 40 MG
TABLET ORAL
Qty: 90 TABLET | Refills: 3 | Status: SHIPPED | OUTPATIENT
Start: 2023-09-06

## 2023-09-06 NOTE — PROGRESS NOTES
09/06/2023    Patient Information  Rosemarie CHINCHILLA [address] [address]      1965  [unfilled]  There is no work phone number on file.    Chief Complaint:     Routine annual physical examination.  Preventative visit.  No new acute complaints.    History of Present Illness:    Patient with chronic medical problems as noted below in the assessment and plan presents for routine annual physical exam/preventative visit.  Patient had lab work recently that we will review.  Her past medical history reviewed and updated were necessary including health maintenance parameters.  This reveals she will be up-to-date or else accounted for after today's visit.  She needs a screening mammogram which I have ordered.    Review of Systems   Constitutional: Negative.   HENT: Negative.     Eyes: Negative.    Cardiovascular: Negative.    Respiratory: Negative.     Endocrine: Negative.    Hematologic/Lymphatic: Negative.    Skin: Negative.    Musculoskeletal: Negative.    Gastrointestinal: Negative.    Genitourinary: Negative.    Neurological: Negative.    Psychiatric/Behavioral: Negative.     Allergic/Immunologic: Negative.      Active Problems:    Patient Active Problem List   Diagnosis    Degeneration of intervertebral disc of mid-cervical region    Hallux valgus of left foot    Hyperlipidemia    Overweight (BMI 25.0-29.9)    Vitamin D deficiency    Therapeutic drug monitoring    Routine physical examination    History of colon polyps, 4/19/2022--tubular adenoma x1.  Hyperplastic x6.11/15/2018--multiple hyperplastic.  11/22/2016-- adenoma ×2. 06/21/2016--tubular adenoma ×2.  Multiple hyperplastic.      Diverticulosis of colon    Multiple environmental allergies    Allergic rhinitis    Chronic right hip pain    Chronic bilateral low back pain with right-sided sciatica    Cigarette smoker    Postmenopausal  state    Pigmented skin lesion of uncertain behavior of upper extremity         Past Medical History:   Diagnosis Date    Allergic rhinitis 11/02/2016    Patient has never had allergy testing.    Chronic bilateral low back pain with right-sided sciatica 05/30/2018 08/08/2018--patient seen in follow-up and reports her symptoms are tolerable although she still has right-sided intermittent lumbar radicular symptoms.  Her back in itself is not particularly bothersome.  It is not particularly interfering with her daily activities.  We discussed options including referral back to neurosurgery versus consideration of physical therapy and perhaps some oral predniso    Cigarette smoker 06/29/2022    Degeneration of intervertebral disc of mid-cervical region 12/23/2015 12/23/2015--MRI cervical spine reveals at C4-C5 there is mild spinal stenosis. There is moderate to severe bilateral foraminal narrowing. At C5-C6 there is mild left but no right foraminal narrowing. At C6-C7 there are surgical changes with hardware and this results in artifact. Evaluation for solid bony fusion cannot be done because of this. There is only minimal if any canal narrowing at C6-C7. There is mild right and mild to moderate left bony foraminal narrowing. C7-T1 reveals minimal right foraminal narrowing.     Disc degeneration, lumbar 05/31/2018 06/07/2018--patient seen in follow-up and reports her hip pain is much better after starting prednisone.  She feels like symptoms are good enough that we can not proceed with any further evaluation or treatment at this time.  05/31/2018--x-ray of the right hip revealed no acute or chronic osseous or soft tissue abnormality.  X-ray of the lumbar spine reveals degenerative disc changes at L3-L4, L4-L5, and L5-S1.  Minor endplate osteophytic changes are noted at multiple levels.  No acute abnormality seen.  Diffuse facet arthritic changes at L4-L5 and L5-S1.  Recommend MRI lumbar spine if clinically  indicated.  Patient notified and started on prednisone 50 mg by mouth daily ×5 days, taper and discontinue.  05/30/2018--patient presents with approximately two-week history of right hip pain which seems to be located laterally and also radiates in towards the inguinal ligament region.  No known trauma.  The pain is worse with increasing weightbearing and also with sleeping on her right side.  No problems with si    Diverticulosis of colon 06/21/2016 11/22/2016--colonoscopy revealed a polyp at the ileocecal valve and sigmoid colon.  There were 4 polyps at the rectosigmoid colon.  The single diverticulum in the sigmoid colon was not commented upon.  Pathology returned a sessile serrated adenoma at the ileocecal valve and the sigmoid colon.  The rectosigmoid polyp was hyperplastic.  06/21/2016--colonoscopy revealed a greater than 50 mm polyp at the ileocecal valve.  Pathology returned hyperplastic polyp.  A multilobulated 5 mm polyp was found in the transverse colon.  Pathology returned tubular adenoma with low grade dysplasia.  3 multilobulated polyps were found in the sigmoid colon.  Pathology revealed fragments of hyperplastic polyps.  2 multilobulated polyps were found in the rectosigmoid colon and pathology returned tubular adenoma with low-grade dysplasia.  A single small mouth diverticula and was found in the sigmoid.  Nonbleeding internal hemorrhoids.  General surgery recommended repeat colonoscopy in 3 months.    Hallux valgus of left foot 04/04/2016 03/13/2015--patient was evaluated by the podiatrist and assessment was ingrown great toenail and paronychia. Partial nail avulsion completed.   03/13/2015--patient presents with a greater than one-year history of intermittent pain, swelling, redness, tenderness involving her left great toe. Examination reveals early hallux valgus deformity as well as a possible ingrown nail. Patient referred to podiatry. Uric acid and CRP obtained to rule out possibility of  gout and both were normal.    Herniated lumbar intervertebral disc 05/31/2018 08/08/2018--patient seen in follow-up and reports her symptoms are tolerable although she still has right-sided intermittent lumbar radicular symptoms.  Her back in itself is not particularly bothersome.  It is not particularly interfering with her daily activities.  We discussed options including referral back to neurosurgery versus consideration of physical therapy and perhaps some oral predniso    History of colon polyps, 4/19/2022--tubular adenoma x1.  Hyperplastic x6.11/15/2018--multiple hyperplastic.  11/22/2016-- adenoma ×2. 06/21/2016--tubular adenoma ×2.  Multiple hyperplastic.   06/21/2016 April 19, 2022--colonoscopy revealed #1, 4 mm polyp in the proximal transverse colon.  #2, 1 to 2 mm polyps in the sigmoid colon.  #4, 1 to 2 mm polyps in the proximal rectum.  Moderate diverticulosis in the sigmoid colon.  Nonbleeding internal hemorrhoids.  Pathology returned tubular adenoma x1, hyperplastic x6.  11/15/2018--colonoscopy revealed several small polyps at the ileocecal valve that we    History of Perirectal abscess 09/01/2016 09/12/2016--patient seen in follow-up for dressing change.  Patient is concerned about not being on an antibiotic and I explained to her that the definitive treatment is incision and drainage.  On exam the wound looks very good with no sign of purulent discharge.  She has a follow-up with general surgeon tomorrow.  09/02/2016--incision and drainage of left perirectal abscess by general surgery.  Cultures returned moderate growth of Klebsiella pneumoniae and heavy growth of Streptococcus agalactiae.   09/01/2016--patient presents with approximately 4 month history of a painful and swollen subcutaneous mass left buttock that is draining foul-smelling purulent material.  Examination confirms a large subcutaneous mass/abscess that I suspect is an infected sebaceous cyst.  General surgery  referral given.    Hyperlipidemia 04/04/2016 03/18/2015--NMR reveals total cholesterol 216, triglycerides mildly elevated at 171. LDL particle number elevated at 1654. Small LDL particle number is normal at 446. HDL particle number normal at 33.4. Recommend diet and lifestyle changes.    Internal hemorrhoids 04/19/2022    Multiple environmental allergies 11/02/2016    Patient has never had allergy testing.    Overweight (BMI 25.0-29.9) 04/04/2016 05/01/2017--patient seen in follow-up and reports she stopped the phentermine and topiramate due to side effects, specifically constipation.  Current weight is 232 pounds which is the same as her last visit.  She discontinued these medications and her symptoms resolved.  She restarted the medications and the symptoms returned.  03/14/2017--patient seen in follow-up and has lost 5 pounds.  She seem    Vitamin D deficiency 04/04/2016         Past Surgical History:   Procedure Laterality Date    CERVICAL FUSION  07/23/2012 07/23/2012--anterior cervical spinal fusion, C6--C7 with instrumentation.    COLONOSCOPY N/A 6/21/2016 06/21/2016--colonoscopy revealed a greater than 50 mm polyp at the ileocecal valve.  Pathology returned hyperplastic polyp.  A multilobulated 5 mm polyp was found in the transverse colon.  Pathology returned tubular adenoma with low grade dysplasia.  3 multilobulated polyps were found in the sigmoid colon.  Pathology revealed fragments of hyperplastic polyps.  2 multilobulated polyps were found in    COLONOSCOPY N/A 11/22/2016 11/22/2016--colonoscopy revealed a polyp at the ileocecal valve and sigmoid colon.  There were 4 polyps at the rectosigmoid colon.  The single diverticulum in the sigmoid colon was not commented upon.  Pathology returned a sessile serrated adenoma at the ileocecal valve and the sigmoid colon.  The rectosigmoid polyp was hyperplastic.    COLONOSCOPY N/A 11/15/2018    11/15/2018--colonoscopy revealed several small polyps  at the ileocecal valve that were removed piecemeal.  #10, 1-2 mm polyps in the sigmoid colon and proximal descending colon.  Removed.  #1, 3 mm polyp in the distal descending colon.  Removed.  Nonbleeding internal hemorrhoids.  Pathology returned multiple hyperplastic polyps.    COLONOSCOPY N/A 4/19/2022    Procedure: COLONOSCOPY TO CECUM WITH HOT SNARE AND BIOPSY POLYPECTOMIES;  Surgeon: Ej Sanford MD;  Location: [facility] ENDOSCOPY;  Service: General;  Laterality: N/A;  PRE: HISTORY OF COLON POLYPS  POST: POLYPS, DIVERTICULOSIS, HEMORRHOIDS    COLONOSCOPY W/ POLYPECTOMY  06/01/2017 06/01/2017--colonoscopy revealed hyperplastic ×2.  Perianal fistulotomy.  External hemorrhoidectomy of one column.    ENDOMETRIAL ABLATION  2006 2006--endometrial ablation ×2 for menorrhagia/menorrhea.    INCISION AND DRAINAGE PERIRECTAL ABSCESS N/A 9/2/2016 09/02/2016--incision and drainage of perirectal abscess.    RECTAL EXAMINATION UNDER ANESTHESIA N/A 6/1/2017 06/01/2017--colonoscopy revealed hyperplastic ×2.  Perianal fistulotomy.  External hemorrhoidectomy of one column.    RECTAL FISTULOTOMY N/A 9/29/2017 09/29/2017--rectal fistulotomy.  Rectal exam under anesthesia.    SUBTOTAL HYSTERECTOMY  10/2008    October 2008--partial hysterectomy for menorrhagia/menorrhea. Previously had endometrial ablation procedures 2. Pt states uterus was removed.    WRIST ARTHROPLASTY Right 07/15/2008    07/15/2008--right radial ulnar distal joint arthroplasty performed for non-traumatic arthritis/impingement syndrome.         No Known Allergies        Current Outpatient Medications:     ascorbic acid (VITAMIN C) 500 MG tablet, Take 1 tablet by mouth Daily., Disp: , Rfl:     B Complex Vitamins (VITAMIN B COMPLEX PO), Take 1 capsule by mouth Daily., Disp: , Rfl:     Cholecalciferol (VITAMIN D3) 5000 UNITS tablet, Take 1 tablet by mouth Daily., Disp: , Rfl:     cyclobenzaprine (FLEXERIL) 10 MG tablet, Take 1 p.o. 3 times  "daily as needed for muscle relaxer, Disp: 60 tablet, Rfl: 3    multivitamin with minerals tablet tablet, Take 1 tablet by mouth Daily., Disp: , Rfl:     pravastatin (PRAVACHOL) 40 MG tablet, Take 1 p.o. daily for high cholesterol, Disp: 90 tablet, Rfl: 3      Family History   Problem Relation Age of Onset    Diabetes Mother         Type 2    Diabetes Maternal Grandmother         Type 2    Malig Hyperthermia Neg Hx     Breast cancer Neg Hx     Ovarian cancer Neg Hx     Colon cancer Neg Hx     Deep vein thrombosis Neg Hx     Pulmonary embolism Neg Hx          Social History     Socioeconomic History    Marital status:     Number of children: 2    Years of education: 12   Tobacco Use    Smoking status: Every Day     Packs/day: 0.50     Years: 20.00     Pack years: 10.00     Types: Cigarettes    Smokeless tobacco: Never   Vaping Use    Vaping Use: Never used   Substance and Sexual Activity    Alcohol use: Yes     Alcohol/week: 1.0 standard drink     Types: 1 Glasses of wine per week     Comment: Occasional    Drug use: No    Sexual activity: Yes     Partners: Male     Birth control/protection: Surgical     Comment: RIVERA [employer] [employer] age 2007         Vitals:    09/06/23 1502   BP: 106/68   BP Location: Right arm   Patient Position: Sitting   Cuff Size: Adult   Pulse: 86   Resp: 14   SpO2: 96%   Weight: 90.9 kg (200 lb 6.4 oz)   Height: 177.8 cm (70\")        Body mass index is 28.75 kg/m².      Physical Exam:    General: Alert and oriented x 3.  No acute distress.  Overweight.  Normal affect.  HEENT: Pupils equal, round, reactive to light; extraocular movements intact; sclerae nonicteric; pharynx, ear canals and TMs normal.  Neck: Without JVD, thyromegaly, bruit, or adenopathy.  Lungs: Clear to auscultation in all fields.  Heart: Regular rate and rhythm without murmur, rub, gallop, or click.  Abdomen: Soft, nontender, without hepatosplenomegaly or hernia.  Bowel sounds normal.  : Deferred.  Rectal: Deferred.  " Extremities: Without clubbing, cyanosis, edema, or pulse deficit.  Neurologic: Intact without focal deficit.  Normal station and gait observed during ingress and egress from the examination room.  Skin: Without significant lesion.  Musculoskeletal: Unremarkable.    Lab/other results:    Thyroid function tests are normal.  Vitamin D normal at 41.1.  Total cholesterol 180, triglycerides 82, LDL particle number borderline at 1366, HDL particle number is good at 43.8.  CMP normal except potassium slightly elevated 5.3.  CBC is normal.  CPK normal.  Urinalysis normal.    Assessment/Plan:     Diagnosis Plan   1. Routine physical examination  CBC (No Diff)    Comprehensive Metabolic Panel    CK    NMR LipoProfile    TSH    T4, Free    T3, Free    Urinalysis With Microscopic If Indicated (No Culture) - Urine, Clean Catch    Vitamin D,25-Hydroxy      2. Hyperlipidemia  Comprehensive Metabolic Panel    CK    NMR LipoProfile    TSH    T4, Free    T3, Free    pravastatin (PRAVACHOL) 40 MG tablet      3. Multiple environmental allergies        4. Overweight (BMI 25.0-29.9)        5. Vitamin D deficiency  Vitamin D,25-Hydroxy      6. Postmenopausal state        7. History of colon polyps, 4/19/2022--tubular adenoma x1.  Hyperplastic x6.11/15/2018--multiple hyperplastic.  11/22/2016-- adenoma ×2. 06/21/2016--tubular adenoma ×2.  Multiple hyperplastic.          8. Cigarette smoker        9. Degeneration of intervertebral disc of mid-cervical region, unspecified spinal level        10. Chronic bilateral low back pain with right-sided sciatica        11. Allergic rhinitis        12. Therapeutic drug monitoring  CBC (No Diff)    Urinalysis With Microscopic If Indicated (No Culture) - Urine, Clean Catch      13. Breast cancer screening by mammogram  Mammo Screening Bilateral With CAD      14. Pigmented skin lesion of uncertain behavior of upper extremity  Ambulatory Referral to Dermatology        Patient presents for routine  annual physical/preventative visit and seems to be doing fairly well.  She has hyperlipidemia that is under reasonable control with the pravastatin.  Her multiple environmental allergies seem to be stable at the present time and not an issue.  Patient is overweight and I strongly encouraged her to follow a low carbohydrate diet, exercise, and lose weight.  She is postmenopausal and is up-to-date on her DEXA scan.  She does need a mammogram.  She has a history of colon polyps and is up-to-date on her colonoscopy.  Patient is a cigarette smoker and is currently is not ready to commit to smoking cessation.  She has intermittent bouts of chronic lower back pain as well as neck pain due to cervical and lumbar disc disease.  Patient has a pigmented skin lesion on her right deltoid region that is changing and sometimes bothers her.  Needs dermatology referral.    Several preventative health issues discussed including review of vaccinations and recommendations, including dietary issues, exercise and weight loss.  Safe sex practices discussed.  Patient advised to wear seatbelt whenever driving and avoid texting and driving.  Also advised to look both ways before crossing the street.  Colon cancer prevention discussed and is up-to-date with colonoscopy.  Advised to avoid tobacco products and minimize alcohol consumption.    Plan is as follows: Diet and weight loss as discussed.  No changes in current medical regimen.  Mammogram ordered.  Patient will follow-up in 1 year for annual physical or follow-up as needed.  I have recommended influenza vaccine in October as soon as they are available.        Procedures

## 2023-09-14 ENCOUNTER — TRANSCRIBE ORDERS (OUTPATIENT)
Dept: ADMINISTRATIVE | Facility: HOSPITAL | Age: 58
End: 2023-09-14
Payer: COMMERCIAL

## 2023-09-14 DIAGNOSIS — Z12.31 BREAST CANCER SCREENING BY MAMMOGRAM: Primary | ICD-10-CM

## 2023-10-10 ENCOUNTER — HOSPITAL ENCOUNTER (OUTPATIENT)
Dept: MAMMOGRAPHY | Facility: HOSPITAL | Age: 58
Discharge: HOME | End: 2023-10-10
Admitting: INTERNAL MEDICINE
Payer: COMMERCIAL

## 2023-10-10 DIAGNOSIS — Z12.31 BREAST CANCER SCREENING BY MAMMOGRAM: ICD-10-CM

## 2023-10-10 PROCEDURE — 77067 SCR MAMMO BI INCL CAD: CPT

## 2023-10-10 PROCEDURE — 77063 BREAST TOMOSYNTHESIS BI: CPT

## 2023-11-06 DIAGNOSIS — E78.2 MIXED HYPERLIPIDEMIA: Chronic | ICD-10-CM

## 2023-11-06 RX ORDER — PRAVASTATIN SODIUM 40 MG
TABLET ORAL
Qty: 90 TABLET | Refills: 3 | Status: SHIPPED | OUTPATIENT
Start: 2023-11-06

## (undated) DEVICE — THE SINGLE USE ETRAP – POLYP TRAP IS USED FOR SUCTION RETRIEVAL OF ENDOSCOPICALLY REMOVED POLYPS.: Brand: ETRAP

## (undated) DEVICE — SNAR POLYP SENSATION STDOVL 27 240 BX40

## (undated) DEVICE — KT ORCA ORCAPOD DISP STRL

## (undated) DEVICE — THE TORRENT IRRIGATION SCOPE CONNECTOR IS USED WITH THE TORRENT IRRIGATION TUBING TO PROVIDE IRRIGATION FLUIDS SUCH AS STERILE WATER DURING GASTROINTESTINAL ENDOSCOPIC PROCEDURES WHEN USED IN CONJUNCTION WITH AN IRRIGATION PUMP (OR ELECTROSURGICAL UNIT).: Brand: TORRENT

## (undated) DEVICE — JELLY,LUBE,STERILE,FLIP TOP,TUBE,4-OZ: Brand: MEDLINE

## (undated) DEVICE — FRCP BIOP RADLJAW4 HOT 2.2X240 BX40

## (undated) DEVICE — LN SMPL CO2 SHTRM SD STREAM W/M LUER

## (undated) DEVICE — THE CARR-LOCKE INJECTION NEEDLE IS A SINGLE USE, DISPOSABLE, FLEXIBLE SHEATH INJECTION NEEDLE USED FOR THE INJECTION OF VARIOUS TYPES OF MEDIA THROUGH FLEXIBLE ENDOSCOPES.

## (undated) DEVICE — SPNG GZ WOVN 4X4IN 12PLY 10/BX STRL

## (undated) DEVICE — TUBING, SUCTION, 1/4" X 10', STRAIGHT: Brand: MEDLINE

## (undated) DEVICE — PREP SOL POVIDONE/IODINE BT 4OZ

## (undated) DEVICE — GLV SURG SENSICARE ALOE LF PF SZ7.5 GRN

## (undated) DEVICE — PANTY KNIT MATERN L/XL

## (undated) DEVICE — CANN O2 ETCO2 FITS ALL CONN CO2 SMPL A/ 7IN DISP LF

## (undated) DEVICE — PREMIUM WET SKIN PREP TRAY: Brand: MEDLINE INDUSTRIES, INC.

## (undated) DEVICE — Device: Brand: DEFENDO AIR/WATER/SUCTION AND BIOPSY VALVE

## (undated) DEVICE — SINGLE-USE BIOPSY FORCEPS: Brand: RADIAL JAW 4

## (undated) DEVICE — STRIP PACKING W IODOFORM 1/4

## (undated) DEVICE — GLV SURG BIOGEL LTX PF 7 1/2

## (undated) DEVICE — HARMONIC ACE +7 SHEARS ADVANCED HEMOSTASIS 5MM DIAMETER 23CM SHAFT LENGTH  FOR USE WITH GRAY HAND PIECE ONLY: Brand: HARMONIC ACE

## (undated) DEVICE — DRAPE,LITHOTOMY,ATTACHED,STERILE: Brand: MEDLINE

## (undated) DEVICE — CANN NASL CO2 TRULINK W/O2 A/

## (undated) DEVICE — DRAPE,UTILITY,TAPE,15X26,STERILE: Brand: MEDLINE

## (undated) DEVICE — ERBE NESSY®PLATE 170 SPLIT; 168CM²; CABLE 3M: Brand: ERBE

## (undated) DEVICE — NDL HYPO PRECISIONGLIDE REG 25G 1 1/2

## (undated) DEVICE — SENSR O2 OXIMAX FNGR A/ 18IN NONSTR

## (undated) DEVICE — ADAPT CLN BIOGUARD AIR/H2O DISP

## (undated) DEVICE — PAD,ABDOMINAL,8"X10",ST,LF: Brand: MEDLINE

## (undated) DEVICE — SNAR POLYP CAPTIVATOR CRSNT 27MM 240CM

## (undated) DEVICE — STPLR SKIN VISISTAT WD 35CT

## (undated) DEVICE — LOU MINOR PROCEDURE: Brand: MEDLINE INDUSTRIES, INC.

## (undated) DEVICE — IRRIGATOR BULB ASEPTO 60CC STRL